# Patient Record
Sex: MALE | Race: WHITE | NOT HISPANIC OR LATINO | Employment: PART TIME | URBAN - METROPOLITAN AREA
[De-identification: names, ages, dates, MRNs, and addresses within clinical notes are randomized per-mention and may not be internally consistent; named-entity substitution may affect disease eponyms.]

---

## 2021-08-06 ENCOUNTER — HOSPITAL ENCOUNTER (OUTPATIENT)
Dept: MEDSURG UNIT | Facility: HOSPITAL | Age: 46
End: 2021-08-07
Attending: INTERNAL MEDICINE | Admitting: INTERNAL MEDICINE

## 2021-08-06 LAB
ABS NEUT (OLG): 17.76 X10(3)/MCL (ref 2.1–9.2)
ALBUMIN SERPL-MCNC: 2.5 GM/DL (ref 3.5–5)
ALBUMIN/GLOB SERPL: 0.5 RATIO (ref 1.1–2)
ALP SERPL-CCNC: 110 UNIT/L (ref 40–150)
ALT SERPL-CCNC: 26 UNIT/L (ref 0–55)
APPEARANCE, UA: CLEAR
AST SERPL-CCNC: 28 UNIT/L (ref 5–34)
BACTERIA #/AREA URNS AUTO: ABNORMAL /HPF
BASOPHILS # BLD AUTO: 0 X10(3)/MCL (ref 0–0.2)
BASOPHILS NFR BLD AUTO: 0 %
BILIRUB SERPL-MCNC: 0.2 MG/DL
BILIRUB UR QL STRIP: NEGATIVE
BILIRUBIN DIRECT+TOT PNL SERPL-MCNC: 0.1 MG/DL (ref 0–0.5)
BILIRUBIN DIRECT+TOT PNL SERPL-MCNC: 0.1 MG/DL (ref 0–0.8)
BUN SERPL-MCNC: 9.9 MG/DL (ref 8.9–20.6)
CALCIUM SERPL-MCNC: 10.3 MG/DL (ref 8.4–10.2)
CHLORIDE SERPL-SCNC: 97 MMOL/L (ref 98–107)
CO2 SERPL-SCNC: 26 MMOL/L (ref 22–29)
COLOR UR: NORMAL
CREAT SERPL-MCNC: 0.76 MG/DL (ref 0.73–1.18)
ERYTHROCYTE [DISTWIDTH] IN BLOOD BY AUTOMATED COUNT: 14.3 % (ref 11.5–14.5)
GLOBULIN SER-MCNC: 5.1 GM/DL (ref 2.4–3.5)
GLUCOSE (UA): NEGATIVE
GLUCOSE SERPL-MCNC: 167 MG/DL (ref 74–100)
HAV IGM SERPL QL IA: NONREACTIVE
HBV CORE IGM SERPL QL IA: NONREACTIVE
HBV SURFACE AG SERPL QL IA: NONREACTIVE
HCT VFR BLD AUTO: 28.5 % (ref 40–51)
HCV AB SERPL QL IA: NONREACTIVE
HGB BLD-MCNC: 9.3 GM/DL (ref 13.5–17.5)
HGB UR QL STRIP: NEGATIVE
HIV 1+2 AB+HIV1 P24 AG SERPL QL IA: NONREACTIVE
HYALINE CASTS #/AREA URNS LPF: ABNORMAL /LPF
IMM GRANULOCYTES # BLD AUTO: 0.08 10*3/UL
IMM GRANULOCYTES NFR BLD AUTO: 0 %
KETONES UR QL STRIP: NEGATIVE
LACTATE SERPL-SCNC: 1 MMOL/L (ref 0.5–2.2)
LEUKOCYTE ESTERASE UR QL STRIP: NEGATIVE
LYMPHOCYTES # BLD AUTO: 0.4 X10(3)/MCL (ref 0.6–4.6)
LYMPHOCYTES NFR BLD AUTO: 2 %
MAGNESIUM SERPL-MCNC: 2.1 MG/DL (ref 1.6–2.6)
MCH RBC QN AUTO: 25.8 PG (ref 26–34)
MCHC RBC AUTO-ENTMCNC: 32.6 GM/DL (ref 31–37)
MCV RBC AUTO: 78.9 FL (ref 80–100)
MONOCYTES # BLD AUTO: 0.1 X10(3)/MCL (ref 0.1–1.3)
MONOCYTES NFR BLD AUTO: 0 %
NEUTROPHILS # BLD AUTO: 17.76 X10(3)/MCL (ref 2.1–9.2)
NEUTROPHILS NFR BLD AUTO: 97 %
NITRITE UR QL STRIP: NEGATIVE
NRBC BLD AUTO-RTO: 0 % (ref 0–0.2)
PH UR STRIP: 7.5 [PH] (ref 4.5–8)
PHOSPHATE SERPL-MCNC: 3 MG/DL (ref 2.3–4.7)
PLATELET # BLD AUTO: 493 X10(3)/MCL (ref 130–400)
PMV BLD AUTO: 9 FL (ref 7.4–10.4)
POTASSIUM SERPL-SCNC: 4.5 MMOL/L (ref 3.5–5.1)
PROT SERPL-MCNC: 7.6 GM/DL (ref 6.4–8.3)
PROT UR QL STRIP: NEGATIVE
RBC # BLD AUTO: 3.61 X10(6)/MCL (ref 4.5–5.9)
RBC #/AREA URNS AUTO: ABNORMAL /HPF
SARS-COV-2 AG RESP QL IA.RAPID: NEGATIVE
SODIUM SERPL-SCNC: 130 MMOL/L (ref 136–145)
SP GR UR STRIP: NORMAL (ref 1–1.03)
SQUAMOUS #/AREA URNS LPF: ABNORMAL /LPF
UROBILINOGEN UR STRIP-ACNC: NORMAL
WBC # SPEC AUTO: 18.4 X10(3)/MCL (ref 4.5–11)
WBC #/AREA URNS AUTO: ABNORMAL /HPF

## 2021-08-07 LAB
ABS NEUT (OLG): 11.95 X10(3)/MCL (ref 2.1–9.2)
ALBUMIN SERPL-MCNC: 2.2 GM/DL (ref 3.5–5)
ALBUMIN/GLOB SERPL: 0.5 RATIO (ref 1.1–2)
ALP SERPL-CCNC: 95 UNIT/L (ref 40–150)
ALT SERPL-CCNC: 25 UNIT/L (ref 0–55)
AST SERPL-CCNC: 22 UNIT/L (ref 5–34)
BASOPHILS # BLD AUTO: 0 X10(3)/MCL (ref 0–0.2)
BASOPHILS NFR BLD AUTO: 0 %
BILIRUB SERPL-MCNC: 0.1 MG/DL
BILIRUBIN DIRECT+TOT PNL SERPL-MCNC: 0 MG/DL (ref 0–0.8)
BILIRUBIN DIRECT+TOT PNL SERPL-MCNC: 0.1 MG/DL (ref 0–0.5)
BUN SERPL-MCNC: 10.3 MG/DL (ref 8.9–20.6)
CALCIUM SERPL-MCNC: 9.8 MG/DL (ref 8.4–10.2)
CHLORIDE SERPL-SCNC: 101 MMOL/L (ref 98–107)
CO2 SERPL-SCNC: 25 MMOL/L (ref 22–29)
CREAT SERPL-MCNC: 0.67 MG/DL (ref 0.73–1.18)
ERYTHROCYTE [DISTWIDTH] IN BLOOD BY AUTOMATED COUNT: 14.2 % (ref 11.5–14.5)
GLOBULIN SER-MCNC: 4.4 GM/DL (ref 2.4–3.5)
GLUCOSE SERPL-MCNC: 131 MG/DL (ref 74–100)
HCT VFR BLD AUTO: 25.6 % (ref 40–51)
HGB BLD-MCNC: 8.2 GM/DL (ref 13.5–17.5)
IMM GRANULOCYTES # BLD AUTO: 0.07 10*3/UL
IMM GRANULOCYTES NFR BLD AUTO: 0 %
LYMPHOCYTES # BLD AUTO: 1.1 X10(3)/MCL (ref 0.6–4.6)
LYMPHOCYTES NFR BLD AUTO: 8 %
MCH RBC QN AUTO: 25.4 PG (ref 26–34)
MCHC RBC AUTO-ENTMCNC: 32 GM/DL (ref 31–37)
MCV RBC AUTO: 79.3 FL (ref 80–100)
MONOCYTES # BLD AUTO: 0.6 X10(3)/MCL (ref 0.1–1.3)
MONOCYTES NFR BLD AUTO: 4 %
NEUTROPHILS # BLD AUTO: 11.95 X10(3)/MCL (ref 2.1–9.2)
NEUTROPHILS NFR BLD AUTO: 87 %
NRBC BLD AUTO-RTO: 0 % (ref 0–0.2)
OSMOLALITY SERPL: 276 MOSM/KG (ref 280–300)
OSMOLALITY UR: 403 MOSM/KG (ref 300–1300)
PLATELET # BLD AUTO: 482 X10(3)/MCL (ref 130–400)
PMV BLD AUTO: 9.3 FL (ref 7.4–10.4)
POTASSIUM SERPL-SCNC: 4.1 MMOL/L (ref 3.5–5.1)
PROT SERPL-MCNC: 6.6 GM/DL (ref 6.4–8.3)
PTH-INTACT SERPL-MCNC: 12.7 PG/ML (ref 8.7–77)
RBC # BLD AUTO: 3.23 X10(6)/MCL (ref 4.5–5.9)
SODIUM SERPL-SCNC: 130 MMOL/L (ref 136–145)
SODIUM UR-SCNC: 118 MMOL/L
WBC # SPEC AUTO: 13.7 X10(3)/MCL (ref 4.5–11)

## 2021-08-16 ENCOUNTER — HISTORICAL (OUTPATIENT)
Dept: ADMINISTRATIVE | Facility: HOSPITAL | Age: 46
End: 2021-08-16

## 2021-08-16 LAB — SARS-COV-2 AG RESP QL IA.RAPID: NEGATIVE

## 2021-08-18 ENCOUNTER — HISTORICAL (OUTPATIENT)
Dept: SURGERY | Facility: HOSPITAL | Age: 46
End: 2021-08-18

## 2021-09-15 ENCOUNTER — HISTORICAL (OUTPATIENT)
Dept: RADIATION THERAPY | Facility: HOSPITAL | Age: 46
End: 2021-09-15

## 2021-09-16 ENCOUNTER — HISTORICAL (OUTPATIENT)
Dept: RADIATION THERAPY | Facility: HOSPITAL | Age: 46
End: 2021-09-16

## 2021-09-17 ENCOUNTER — HISTORICAL (OUTPATIENT)
Dept: RADIATION THERAPY | Facility: HOSPITAL | Age: 46
End: 2021-09-17

## 2021-09-20 ENCOUNTER — HISTORICAL (OUTPATIENT)
Dept: RADIATION THERAPY | Facility: HOSPITAL | Age: 46
End: 2021-09-20

## 2021-09-21 ENCOUNTER — HISTORICAL (OUTPATIENT)
Dept: RADIATION THERAPY | Facility: HOSPITAL | Age: 46
End: 2021-09-21

## 2021-09-21 ENCOUNTER — HISTORICAL (OUTPATIENT)
Dept: ADMINISTRATIVE | Facility: HOSPITAL | Age: 46
End: 2021-09-21

## 2021-09-21 LAB
ABS NEUT (OLG): 13.59 X10(3)/MCL (ref 2.1–9.2)
ALBUMIN SERPL-MCNC: 2.1 GM/DL (ref 3.5–5)
ALBUMIN/GLOB SERPL: 0.5 RATIO (ref 1.1–2)
ALP SERPL-CCNC: 87 UNIT/L (ref 40–150)
ALT SERPL-CCNC: 18 UNIT/L (ref 0–55)
APPEARANCE, UA: CLEAR
AST SERPL-CCNC: 20 UNIT/L (ref 5–34)
BACTERIA SPEC CULT: ABNORMAL /HPF
BASOPHILS # BLD AUTO: 0.1 X10(3)/MCL (ref 0–0.2)
BASOPHILS NFR BLD AUTO: 0 %
BILIRUB SERPL-MCNC: 0.3 MG/DL
BILIRUB UR QL STRIP: NEGATIVE
BILIRUBIN DIRECT+TOT PNL SERPL-MCNC: 0.1 MG/DL (ref 0–0.8)
BILIRUBIN DIRECT+TOT PNL SERPL-MCNC: 0.2 MG/DL (ref 0–0.5)
BUN SERPL-MCNC: 7.4 MG/DL (ref 8.9–20.6)
CALCIUM SERPL-MCNC: 8.2 MG/DL (ref 8.4–10.2)
CHLORIDE SERPL-SCNC: 97 MMOL/L (ref 98–107)
CO2 SERPL-SCNC: 20 MMOL/L (ref 22–29)
COLOR UR: YELLOW
CREAT SERPL-MCNC: 0.55 MG/DL (ref 0.73–1.18)
EOSINOPHIL # BLD AUTO: 0.1 X10(3)/MCL (ref 0–0.9)
EOSINOPHIL NFR BLD AUTO: 0 %
ERYTHROCYTE [DISTWIDTH] IN BLOOD BY AUTOMATED COUNT: 17.2 % (ref 11.5–17)
GLOBULIN SER-MCNC: 4.4 GM/DL (ref 2.4–3.5)
GLUCOSE (UA): NEGATIVE
GLUCOSE SERPL-MCNC: 137 MG/DL (ref 74–100)
HCT VFR BLD AUTO: 31.8 % (ref 42–52)
HGB BLD-MCNC: 9.8 GM/DL (ref 14–18)
HGB UR QL STRIP: NEGATIVE
KETONES UR QL STRIP: NEGATIVE
LEUKOCYTE ESTERASE UR QL STRIP: NEGATIVE
LYMPHOCYTES # BLD AUTO: 0.3 X10(3)/MCL (ref 0.6–4.6)
LYMPHOCYTES NFR BLD AUTO: 2 %
MCH RBC QN AUTO: 24.6 PG (ref 27–31)
MCHC RBC AUTO-ENTMCNC: 30.8 GM/DL (ref 33–36)
MCV RBC AUTO: 79.7 FL (ref 80–94)
MONOCYTES # BLD AUTO: 0.9 X10(3)/MCL (ref 0.1–1.3)
MONOCYTES NFR BLD AUTO: 6 %
NEUTROPHILS # BLD AUTO: 13.59 X10(3)/MCL (ref 2.1–9.2)
NEUTROPHILS NFR BLD AUTO: 90 %
NITRITE UR QL STRIP: NEGATIVE
PH UR STRIP: 7.5 [PH] (ref 5–9)
PLATELET # BLD AUTO: 588 X10(3)/MCL (ref 130–400)
PMV BLD AUTO: 9.1 FL (ref 9.4–12.4)
POTASSIUM SERPL-SCNC: 4.6 MMOL/L (ref 3.5–5.1)
PROT SERPL-MCNC: 6.5 GM/DL (ref 6.4–8.3)
PROT UR QL STRIP: ABNORMAL
RBC # BLD AUTO: 3.99 X10(6)/MCL (ref 4.7–6.1)
RBC #/AREA URNS HPF: ABNORMAL /[HPF]
SODIUM SERPL-SCNC: 128 MMOL/L (ref 136–145)
SP GR UR STRIP: 1.02 (ref 1–1.03)
SQUAMOUS EPITHELIAL, UA: ABNORMAL /HPF (ref 0–4)
UROBILINOGEN UR STRIP-ACNC: 1
WBC # SPEC AUTO: 15.1 X10(3)/MCL (ref 4.5–11.5)
WBC #/AREA URNS HPF: ABNORMAL /HPF

## 2021-09-22 ENCOUNTER — HISTORICAL (OUTPATIENT)
Dept: RADIATION THERAPY | Facility: HOSPITAL | Age: 46
End: 2021-09-22

## 2021-09-23 ENCOUNTER — HISTORICAL (OUTPATIENT)
Dept: RADIATION THERAPY | Facility: HOSPITAL | Age: 46
End: 2021-09-23

## 2021-09-24 ENCOUNTER — HISTORICAL (OUTPATIENT)
Dept: RADIATION THERAPY | Facility: HOSPITAL | Age: 46
End: 2021-09-24

## 2021-09-27 ENCOUNTER — HISTORICAL (OUTPATIENT)
Dept: RADIATION THERAPY | Facility: HOSPITAL | Age: 46
End: 2021-09-27

## 2021-09-28 ENCOUNTER — HISTORICAL (OUTPATIENT)
Dept: RADIATION THERAPY | Facility: HOSPITAL | Age: 46
End: 2021-09-28

## 2021-09-29 ENCOUNTER — HISTORICAL (OUTPATIENT)
Dept: RADIATION THERAPY | Facility: HOSPITAL | Age: 46
End: 2021-09-29

## 2021-09-30 ENCOUNTER — HISTORICAL (OUTPATIENT)
Dept: ADMINISTRATIVE | Facility: HOSPITAL | Age: 46
End: 2021-09-30

## 2021-09-30 ENCOUNTER — HISTORICAL (OUTPATIENT)
Dept: RADIOLOGY | Facility: HOSPITAL | Age: 46
End: 2021-09-30

## 2021-09-30 ENCOUNTER — HISTORICAL (OUTPATIENT)
Dept: RADIATION THERAPY | Facility: HOSPITAL | Age: 46
End: 2021-09-30

## 2021-09-30 LAB
ABS NEUT (OLG): 6.46 X10(3)/MCL (ref 2.1–9.2)
ALBUMIN SERPL-MCNC: 2.6 GM/DL (ref 3.5–5)
ALBUMIN/GLOB SERPL: 0.5 RATIO (ref 1.1–2)
ALP SERPL-CCNC: 79 UNIT/L (ref 40–150)
ALT SERPL-CCNC: 27 UNIT/L (ref 0–55)
AST SERPL-CCNC: 23 UNIT/L (ref 5–34)
BASOPHILS # BLD AUTO: 0 X10(3)/MCL (ref 0–0.2)
BASOPHILS NFR BLD AUTO: 1 %
BILIRUB SERPL-MCNC: 0.2 MG/DL
BILIRUBIN DIRECT+TOT PNL SERPL-MCNC: 0.1 MG/DL (ref 0–0.5)
BILIRUBIN DIRECT+TOT PNL SERPL-MCNC: 0.1 MG/DL (ref 0–0.8)
BUN SERPL-MCNC: 12.8 MG/DL (ref 8.9–20.6)
CALCIUM SERPL-MCNC: 9.3 MG/DL (ref 8.4–10.2)
CHLORIDE SERPL-SCNC: 99 MMOL/L (ref 98–107)
CO2 SERPL-SCNC: 26 MMOL/L (ref 22–29)
CREAT SERPL-MCNC: 0.56 MG/DL (ref 0.73–1.18)
EOSINOPHIL # BLD AUTO: 0.1 X10(3)/MCL (ref 0–0.9)
EOSINOPHIL NFR BLD AUTO: 2 %
ERYTHROCYTE [DISTWIDTH] IN BLOOD BY AUTOMATED COUNT: 17.4 % (ref 11.5–14.5)
FERRITIN SERPL-MCNC: 714.65 NG/ML (ref 21.81–274.66)
GLOBULIN SER-MCNC: 5.4 GM/DL (ref 2.4–3.5)
GLUCOSE SERPL-MCNC: 73 MG/DL (ref 74–100)
HCT VFR BLD AUTO: 34.7 % (ref 40–51)
HGB BLD-MCNC: 10.5 GM/DL (ref 13.5–17.5)
IMM GRANULOCYTES # BLD AUTO: 0.06 10*3/UL
IMM GRANULOCYTES NFR BLD AUTO: 1 %
IRON SATN MFR SERPL: 11 % (ref 20–50)
IRON SERPL-MCNC: 24 UG/DL (ref 65–175)
LYMPHOCYTES # BLD AUTO: 0.5 X10(3)/MCL (ref 0.6–4.6)
LYMPHOCYTES NFR BLD AUTO: 6 %
MCH RBC QN AUTO: 24.2 PG (ref 26–34)
MCHC RBC AUTO-ENTMCNC: 30.3 GM/DL (ref 31–37)
MCV RBC AUTO: 80.1 FL (ref 80–100)
MONOCYTES # BLD AUTO: 0.9 X10(3)/MCL (ref 0.1–1.3)
MONOCYTES NFR BLD AUTO: 11 %
NEUTROPHILS # BLD AUTO: 6.46 X10(3)/MCL (ref 2.1–9.2)
NEUTROPHILS NFR BLD AUTO: 80 %
NRBC BLD AUTO-RTO: 0 % (ref 0–0.2)
PLATELET # BLD AUTO: 431 X10(3)/MCL (ref 130–400)
PMV BLD AUTO: 8.7 FL (ref 7.4–10.4)
POTASSIUM SERPL-SCNC: 4.8 MMOL/L (ref 3.5–5.1)
PROT SERPL-MCNC: 8 GM/DL (ref 6.4–8.3)
RBC # BLD AUTO: 4.33 X10(6)/MCL (ref 4.5–5.9)
SODIUM SERPL-SCNC: 132 MMOL/L (ref 136–145)
T4 FREE SERPL-MCNC: 0.97 NG/DL (ref 0.7–1.48)
TIBC SERPL-MCNC: 196 UG/DL (ref 69–240)
TIBC SERPL-MCNC: 220 UG/DL (ref 250–450)
TRANSFERRIN SERPL-MCNC: 190 MG/DL (ref 174–364)
VIT B12 SERPL-MCNC: 1046 PG/ML (ref 213–816)
WBC # SPEC AUTO: 8.1 X10(3)/MCL (ref 4.5–11)

## 2021-10-04 ENCOUNTER — HISTORICAL (OUTPATIENT)
Dept: RADIATION THERAPY | Facility: HOSPITAL | Age: 46
End: 2021-10-04

## 2021-10-05 ENCOUNTER — HISTORICAL (OUTPATIENT)
Dept: ADMINISTRATIVE | Facility: HOSPITAL | Age: 46
End: 2021-10-05

## 2021-10-05 LAB
ALBUMIN SERPL-MCNC: 3.6 G/DL (ref 4–5)
ALBUMIN/GLOB SERPL: 0.9 {RATIO} (ref 1.2–2.2)
ALP SERPL-CCNC: 95 IU/L (ref 44–121)
ALT SERPL-CCNC: 14 IU/L (ref 0–44)
AST SERPL-CCNC: 14 IU/L (ref 0–40)
BASOPHILS # BLD AUTO: 0.1 X10E3/UL (ref 0–0.2)
BASOPHILS NFR BLD AUTO: 1 %
BILIRUB SERPL-MCNC: <0.2 MG/DL (ref 0–1.2)
BUN SERPL-MCNC: 23 MG/DL (ref 6–24)
CALCIUM SERPL-MCNC: 8.9 MG/DL (ref 8.7–10.2)
CHLORIDE SERPL-SCNC: 99 MMOL/L (ref 96–106)
CO2 SERPL-SCNC: 21 MMOL/L (ref 20–29)
CREAT SERPL-MCNC: 0.49 MG/DL (ref 0.76–1.27)
CREAT/UREA NIT SERPL: 47 (ref 9–20)
EOSINOPHIL # BLD AUTO: 0.2 X10E3/UL (ref 0–0.4)
EOSINOPHIL NFR BLD AUTO: 2 %
ERYTHROCYTE [DISTWIDTH] IN BLOOD BY AUTOMATED COUNT: 19.1 % (ref 11.6–15.4)
GLOBULIN SER-MCNC: 4 G/DL (ref 1.5–4.5)
GLUCOSE SERPL-MCNC: 112 MG/DL (ref 65–99)
HCT VFR BLD AUTO: 36.8 % (ref 37.5–51)
HGB BLD-MCNC: 11.2 G/DL (ref 13–17.7)
LYMPHOCYTES # BLD AUTO: 0.3 X10E3/UL (ref 0.7–3.1)
LYMPHOCYTES NFR BLD AUTO: 4 %
MCH RBC QN AUTO: 24.7 PG (ref 26.6–33)
MCHC RBC AUTO-ENTMCNC: 30.4 G/DL (ref 31.5–35.7)
MCV RBC AUTO: 81 FL (ref 79–97)
MONOCYTES # BLD AUTO: 0.6 X10E3/UL (ref 0.1–0.9)
MONOCYTES NFR BLD AUTO: 7 %
NEUTROPHILS # BLD AUTO: 7 X10E3/UL (ref 1.4–7)
NEUTROPHILS NFR BLD AUTO: 86 %
PLATELET # BLD AUTO: 353 X10E3/UL (ref 150–450)
POTASSIUM SERPL-SCNC: 5 MMOL/L (ref 3.5–5.2)
PROT SERPL-MCNC: 7.6 G/DL (ref 6–8.5)
RBC # BLD AUTO: 4.54 X10(6)/MCL (ref 4.14–5.8)
SODIUM SERPL-SCNC: 133 MMOL/L (ref 134–144)
WBC # SPEC AUTO: 8.2 X10E3/UL (ref 3.4–10.8)

## 2021-10-08 ENCOUNTER — HISTORICAL (OUTPATIENT)
Dept: ADMINISTRATIVE | Facility: HOSPITAL | Age: 46
End: 2021-10-08

## 2021-10-08 LAB
ABS NEUT (OLG): 8.42 X10(3)/MCL (ref 2.1–9.2)
ALBUMIN SERPL-MCNC: 3.3 GM/DL (ref 3.5–5)
ALBUMIN/GLOB SERPL: 0.6 RATIO (ref 1.1–2)
ALP SERPL-CCNC: 93 UNIT/L (ref 40–150)
ALT SERPL-CCNC: 11 UNIT/L (ref 0–55)
AST SERPL-CCNC: 15 UNIT/L (ref 5–34)
BASOPHILS # BLD AUTO: 0.1 X10(3)/MCL (ref 0–0.2)
BASOPHILS NFR BLD AUTO: 1 %
BILIRUB SERPL-MCNC: 0.1 MG/DL
BILIRUBIN DIRECT+TOT PNL SERPL-MCNC: 0 MG/DL (ref 0–0.8)
BILIRUBIN DIRECT+TOT PNL SERPL-MCNC: 0.1 MG/DL (ref 0–0.5)
BUN SERPL-MCNC: 18.4 MG/DL (ref 8.9–20.6)
CALCIUM SERPL-MCNC: 10 MG/DL (ref 8.4–10.2)
CHLORIDE SERPL-SCNC: 105 MMOL/L (ref 98–107)
CO2 SERPL-SCNC: 25 MMOL/L (ref 22–29)
CREAT SERPL-MCNC: 0.63 MG/DL (ref 0.73–1.18)
EOSINOPHIL # BLD AUTO: 0.2 X10(3)/MCL (ref 0–0.9)
EOSINOPHIL NFR BLD AUTO: 2 %
ERYTHROCYTE [DISTWIDTH] IN BLOOD BY AUTOMATED COUNT: 19.9 % (ref 11.5–14.5)
GLOBULIN SER-MCNC: 5.3 GM/DL (ref 2.4–3.5)
GLUCOSE SERPL-MCNC: 97 MG/DL (ref 74–100)
HCT VFR BLD AUTO: 38.5 % (ref 40–51)
HGB BLD-MCNC: 11.6 GM/DL (ref 13.5–17.5)
IMM GRANULOCYTES # BLD AUTO: 0.05 10*3/UL
IMM GRANULOCYTES NFR BLD AUTO: 0 %
LYMPHOCYTES # BLD AUTO: 0.6 X10(3)/MCL (ref 0.6–4.6)
LYMPHOCYTES NFR BLD AUTO: 6 %
MCH RBC QN AUTO: 24.9 PG (ref 26–34)
MCHC RBC AUTO-ENTMCNC: 30.1 GM/DL (ref 31–37)
MCV RBC AUTO: 82.6 FL (ref 80–100)
MONOCYTES # BLD AUTO: 1 X10(3)/MCL (ref 0.1–1.3)
MONOCYTES NFR BLD AUTO: 10 %
NEUTROPHILS # BLD AUTO: 8.42 X10(3)/MCL (ref 2.1–9.2)
NEUTROPHILS NFR BLD AUTO: 81 %
NRBC BLD AUTO-RTO: 0 % (ref 0–0.2)
PLATELET # BLD AUTO: 331 X10(3)/MCL (ref 130–400)
PMV BLD AUTO: 8.9 FL (ref 7.4–10.4)
POTASSIUM SERPL-SCNC: 4 MMOL/L (ref 3.5–5.1)
PROT SERPL-MCNC: 8.6 GM/DL (ref 6.4–8.3)
RBC # BLD AUTO: 4.66 X10(6)/MCL (ref 4.5–5.9)
SARS-COV-2 RNA RESP QL NAA+PROBE: NOT DETECTED
SODIUM SERPL-SCNC: 136 MMOL/L (ref 136–145)
WBC # SPEC AUTO: 10.4 X10(3)/MCL (ref 4.5–11)

## 2021-10-11 ENCOUNTER — HISTORICAL (OUTPATIENT)
Dept: SURGERY | Facility: HOSPITAL | Age: 46
End: 2021-10-11

## 2021-10-15 ENCOUNTER — HISTORICAL (OUTPATIENT)
Dept: ADMINISTRATIVE | Facility: HOSPITAL | Age: 46
End: 2021-10-15

## 2021-10-15 LAB
ABS NEUT (OLG): 5.36 X10(3)/MCL (ref 2.1–9.2)
ALBUMIN SERPL-MCNC: 3.5 GM/DL (ref 3.5–5)
ALBUMIN/GLOB SERPL: 0.8 RATIO (ref 1.1–2)
ALP SERPL-CCNC: 71 UNIT/L (ref 40–150)
ALT SERPL-CCNC: 12 UNIT/L (ref 0–55)
AST SERPL-CCNC: 11 UNIT/L (ref 5–34)
BASOPHILS # BLD AUTO: 0.1 X10(3)/MCL (ref 0–0.2)
BASOPHILS NFR BLD AUTO: 1 %
BILIRUB SERPL-MCNC: 0.1 MG/DL
BILIRUBIN DIRECT+TOT PNL SERPL-MCNC: 0 MG/DL (ref 0–0.8)
BILIRUBIN DIRECT+TOT PNL SERPL-MCNC: 0.1 MG/DL (ref 0–0.5)
BUN SERPL-MCNC: 22.1 MG/DL (ref 8.9–20.6)
CALCIUM SERPL-MCNC: 9.9 MG/DL (ref 8.4–10.2)
CHLORIDE SERPL-SCNC: 105 MMOL/L (ref 98–107)
CO2 SERPL-SCNC: 24 MMOL/L (ref 22–29)
CREAT SERPL-MCNC: 0.67 MG/DL (ref 0.73–1.18)
EOSINOPHIL # BLD AUTO: 0.3 X10(3)/MCL (ref 0–0.9)
EOSINOPHIL NFR BLD AUTO: 4 %
ERYTHROCYTE [DISTWIDTH] IN BLOOD BY AUTOMATED COUNT: 23 % (ref 11.5–14.5)
FOLATE SERPL-MCNC: 14.4 NG/ML (ref 7–31.4)
GLOBULIN SER-MCNC: 4.6 GM/DL (ref 2.4–3.5)
GLUCOSE SERPL-MCNC: 82 MG/DL (ref 74–100)
HCT VFR BLD AUTO: 37.6 % (ref 40–51)
HGB BLD-MCNC: 11.8 GM/DL (ref 13.5–17.5)
IMM GRANULOCYTES # BLD AUTO: 0.11 10*3/UL
IMM GRANULOCYTES NFR BLD AUTO: 2 %
IRON SATN MFR SERPL: 20 % (ref 20–50)
IRON SERPL-MCNC: 64 UG/DL (ref 65–175)
LYMPHOCYTES # BLD AUTO: 0.8 X10(3)/MCL (ref 0.6–4.6)
LYMPHOCYTES NFR BLD AUTO: 10 %
MAGNESIUM SERPL-MCNC: 2.2 MG/DL (ref 1.6–2.6)
MCH RBC QN AUTO: 26.2 PG (ref 26–34)
MCHC RBC AUTO-ENTMCNC: 31.4 GM/DL (ref 31–37)
MCV RBC AUTO: 83.4 FL (ref 80–100)
MONOCYTES # BLD AUTO: 1 X10(3)/MCL (ref 0.1–1.3)
MONOCYTES NFR BLD AUTO: 13 %
NEUTROPHILS # BLD AUTO: 5.36 X10(3)/MCL (ref 2.1–9.2)
NEUTROPHILS NFR BLD AUTO: 71 %
NRBC BLD AUTO-RTO: 0 % (ref 0–0.2)
PLATELET # BLD AUTO: 252 X10(3)/MCL (ref 130–400)
PMV BLD AUTO: 8.7 FL (ref 7.4–10.4)
POTASSIUM SERPL-SCNC: 4.7 MMOL/L (ref 3.5–5.1)
PROT SERPL-MCNC: 8.1 GM/DL (ref 6.4–8.3)
RBC # BLD AUTO: 4.51 X10(6)/MCL (ref 4.5–5.9)
SODIUM SERPL-SCNC: 136 MMOL/L (ref 136–145)
TIBC SERPL-MCNC: 250 UG/DL (ref 69–240)
TIBC SERPL-MCNC: 314 UG/DL (ref 250–450)
TRANSFERRIN SERPL-MCNC: 283 MG/DL (ref 174–364)
VIT B12 SERPL-MCNC: 862 PG/ML (ref 213–816)
WBC # SPEC AUTO: 7.6 X10(3)/MCL (ref 4.5–11)

## 2021-10-18 ENCOUNTER — HISTORICAL (OUTPATIENT)
Dept: INFUSION THERAPY | Facility: HOSPITAL | Age: 46
End: 2021-10-18

## 2021-10-25 ENCOUNTER — HISTORICAL (OUTPATIENT)
Dept: INFUSION THERAPY | Facility: HOSPITAL | Age: 46
End: 2021-10-25

## 2021-10-25 LAB
ABS NEUT (OLG): 0.19 X10(3)/MCL (ref 2.1–9.2)
ALBUMIN SERPL-MCNC: 3.3 GM/DL (ref 3.5–5)
ALBUMIN/GLOB SERPL: 0.8 RATIO (ref 1.1–2)
ALP SERPL-CCNC: 45 UNIT/L (ref 40–150)
ALT SERPL-CCNC: 13 UNIT/L (ref 0–55)
ANISOCYTOSIS BLD QL SMEAR: ABNORMAL
AST SERPL-CCNC: 14 UNIT/L (ref 5–34)
BASOPHILS NFR BLD MANUAL: 0 %
BILIRUB SERPL-MCNC: 0.3 MG/DL
BILIRUBIN DIRECT+TOT PNL SERPL-MCNC: 0.1 MG/DL (ref 0–0.8)
BILIRUBIN DIRECT+TOT PNL SERPL-MCNC: 0.2 MG/DL (ref 0–0.5)
BUN SERPL-MCNC: 17.8 MG/DL (ref 8.9–20.6)
CALCIUM SERPL-MCNC: 9.7 MG/DL (ref 8.7–10.5)
CHLORIDE SERPL-SCNC: 103 MMOL/L (ref 98–107)
CO2 SERPL-SCNC: 21 MMOL/L (ref 22–29)
CREAT SERPL-MCNC: 0.67 MG/DL (ref 0.73–1.18)
EOSINOPHIL NFR BLD MANUAL: 0 %
ERYTHROCYTE [DISTWIDTH] IN BLOOD BY AUTOMATED COUNT: 23.2 % (ref 11.5–14.5)
GLOBULIN SER-MCNC: 4 GM/DL (ref 2.4–3.5)
GLUCOSE SERPL-MCNC: 121 MG/DL (ref 74–100)
GRANULOCYTES NFR BLD MANUAL: 44 % (ref 43–75)
HCT VFR BLD AUTO: 37.3 % (ref 40–51)
HGB BLD-MCNC: 12.2 GM/DL (ref 13.5–17.5)
LYMPHOCYTES NFR BLD MANUAL: 40 % (ref 20.5–51.1)
MAGNESIUM SERPL-MCNC: 1.8 MG/DL (ref 1.6–2.6)
MCH RBC QN AUTO: 26.9 PG (ref 26–34)
MCHC RBC AUTO-ENTMCNC: 32.7 GM/DL (ref 31–37)
MCV RBC AUTO: 82.2 FL (ref 80–100)
MONOCYTES NFR BLD MANUAL: 16 % (ref 2–9)
PHOSPHATE SERPL-MCNC: 3.5 MG/DL (ref 2.3–4.7)
PLATELET # BLD AUTO: 158 X10(3)/MCL (ref 130–400)
PLATELET # BLD EST: ADEQUATE 10*3/UL
PMV BLD AUTO: 8.9 FL (ref 7.4–10.4)
POTASSIUM SERPL-SCNC: 3.9 MMOL/L (ref 3.5–5.1)
PROT SERPL-MCNC: 7.3 GM/DL (ref 6.4–8.3)
RBC # BLD AUTO: 4.54 X10(6)/MCL (ref 4.5–5.9)
SODIUM SERPL-SCNC: 132 MMOL/L (ref 136–145)
WBC # SPEC AUTO: 0.7 X10(3)/MCL (ref 4.5–11)

## 2021-10-26 ENCOUNTER — HISTORICAL (OUTPATIENT)
Dept: INFUSION THERAPY | Facility: HOSPITAL | Age: 46
End: 2021-10-26

## 2021-10-27 ENCOUNTER — HISTORICAL (OUTPATIENT)
Dept: INFUSION THERAPY | Facility: HOSPITAL | Age: 46
End: 2021-10-27

## 2021-10-28 ENCOUNTER — HISTORICAL (OUTPATIENT)
Dept: INFUSION THERAPY | Facility: HOSPITAL | Age: 46
End: 2021-10-28

## 2021-10-29 ENCOUNTER — HISTORICAL (OUTPATIENT)
Dept: INFUSION THERAPY | Facility: HOSPITAL | Age: 46
End: 2021-10-29

## 2021-10-29 ENCOUNTER — HISTORICAL (OUTPATIENT)
Dept: RADIOLOGY | Facility: HOSPITAL | Age: 46
End: 2021-10-29

## 2021-11-10 ENCOUNTER — HISTORICAL (OUTPATIENT)
Dept: INFUSION THERAPY | Facility: HOSPITAL | Age: 46
End: 2021-11-10

## 2021-11-10 LAB
ABS NEUT (OLG): 5.35 X10(3)/MCL (ref 2.1–9.2)
ALBUMIN SERPL-MCNC: 3.3 GM/DL (ref 3.5–5)
ALBUMIN/GLOB SERPL: 0.8 RATIO (ref 1.1–2)
ALP SERPL-CCNC: 56 UNIT/L (ref 40–150)
ALT SERPL-CCNC: 8 UNIT/L (ref 0–55)
AST SERPL-CCNC: 10 UNIT/L (ref 5–34)
BASOPHILS # BLD AUTO: 0.1 X10(3)/MCL (ref 0–0.2)
BASOPHILS NFR BLD AUTO: 1 %
BILIRUB SERPL-MCNC: 0.4 MG/DL
BILIRUBIN DIRECT+TOT PNL SERPL-MCNC: 0.2 MG/DL (ref 0–0.5)
BILIRUBIN DIRECT+TOT PNL SERPL-MCNC: 0.2 MG/DL (ref 0–0.8)
BUN SERPL-MCNC: 9.2 MG/DL (ref 8.9–20.6)
CALCIUM SERPL-MCNC: 10 MG/DL (ref 8.7–10.5)
CHLORIDE SERPL-SCNC: 106 MMOL/L (ref 98–107)
CO2 SERPL-SCNC: 27 MMOL/L (ref 22–29)
CREAT SERPL-MCNC: 0.64 MG/DL (ref 0.73–1.18)
EOSINOPHIL # BLD AUTO: 0 X10(3)/MCL (ref 0–0.9)
EOSINOPHIL NFR BLD AUTO: 1 %
ERYTHROCYTE [DISTWIDTH] IN BLOOD BY AUTOMATED COUNT: 22.4 % (ref 11.5–14.5)
GLOBULIN SER-MCNC: 4.2 GM/DL (ref 2.4–3.5)
GLUCOSE SERPL-MCNC: 107 MG/DL (ref 74–100)
HCT VFR BLD AUTO: 38.9 % (ref 40–51)
HGB BLD-MCNC: 12.5 GM/DL (ref 13.5–17.5)
IMM GRANULOCYTES # BLD AUTO: 0.05 10*3/UL
IMM GRANULOCYTES NFR BLD AUTO: 1 %
LYMPHOCYTES # BLD AUTO: 0.8 X10(3)/MCL (ref 0.6–4.6)
LYMPHOCYTES NFR BLD AUTO: 11 %
MAGNESIUM SERPL-MCNC: 2 MG/DL (ref 1.6–2.6)
MCH RBC QN AUTO: 27.3 PG (ref 26–34)
MCHC RBC AUTO-ENTMCNC: 32.1 GM/DL (ref 31–37)
MCV RBC AUTO: 84.9 FL (ref 80–100)
MONOCYTES # BLD AUTO: 0.7 X10(3)/MCL (ref 0.1–1.3)
MONOCYTES NFR BLD AUTO: 10 %
NEUTROPHILS # BLD AUTO: 5.35 X10(3)/MCL (ref 2.1–9.2)
NEUTROPHILS NFR BLD AUTO: 77 %
NRBC BLD AUTO-RTO: 0 % (ref 0–0.2)
PHOSPHATE SERPL-MCNC: 3.1 MG/DL (ref 2.3–4.7)
PLATELET # BLD AUTO: 288 X10(3)/MCL (ref 130–400)
PMV BLD AUTO: 8.5 FL (ref 7.4–10.4)
POTASSIUM SERPL-SCNC: 4.2 MMOL/L (ref 3.5–5.1)
PROT SERPL-MCNC: 7.5 GM/DL (ref 6.4–8.3)
RBC # BLD AUTO: 4.58 X10(6)/MCL (ref 4.5–5.9)
SODIUM SERPL-SCNC: 139 MMOL/L (ref 136–145)
WBC # SPEC AUTO: 6.9 X10(3)/MCL (ref 4.5–11)

## 2021-11-17 ENCOUNTER — HISTORICAL (OUTPATIENT)
Dept: RADIOLOGY | Facility: HOSPITAL | Age: 46
End: 2021-11-17

## 2021-11-18 ENCOUNTER — HISTORICAL (OUTPATIENT)
Dept: RADIATION THERAPY | Facility: HOSPITAL | Age: 46
End: 2021-11-18

## 2021-12-01 ENCOUNTER — HISTORICAL (OUTPATIENT)
Dept: INFUSION THERAPY | Facility: HOSPITAL | Age: 46
End: 2021-12-01

## 2021-12-01 LAB
ABS NEUT (OLG): 7.37 X10(3)/MCL (ref 2.1–9.2)
ALBUMIN SERPL-MCNC: 3.7 GM/DL (ref 3.5–5)
ALBUMIN/GLOB SERPL: 1 RATIO (ref 1.1–2)
ALP SERPL-CCNC: 55 UNIT/L (ref 40–150)
ALT SERPL-CCNC: 17 UNIT/L (ref 0–55)
AST SERPL-CCNC: 14 UNIT/L (ref 5–34)
BASOPHILS # BLD AUTO: 0 X10(3)/MCL (ref 0–0.2)
BASOPHILS NFR BLD AUTO: 0 %
BILIRUB SERPL-MCNC: 0.2 MG/DL
BILIRUBIN DIRECT+TOT PNL SERPL-MCNC: 0.1 MG/DL (ref 0–0.5)
BILIRUBIN DIRECT+TOT PNL SERPL-MCNC: 0.1 MG/DL (ref 0–0.8)
BUN SERPL-MCNC: 11.2 MG/DL (ref 8.9–20.6)
CALCIUM SERPL-MCNC: 9.6 MG/DL (ref 8.7–10.5)
CHLORIDE SERPL-SCNC: 103 MMOL/L (ref 98–107)
CO2 SERPL-SCNC: 24 MMOL/L (ref 22–29)
CREAT SERPL-MCNC: 0.78 MG/DL (ref 0.73–1.18)
ERYTHROCYTE [DISTWIDTH] IN BLOOD BY AUTOMATED COUNT: 22.5 % (ref 11.5–14.5)
GLOBULIN SER-MCNC: 3.6 GM/DL (ref 2.4–3.5)
GLUCOSE SERPL-MCNC: 149 MG/DL (ref 74–100)
HCT VFR BLD AUTO: 36.7 % (ref 40–51)
HGB BLD-MCNC: 12.1 GM/DL (ref 13.5–17.5)
IMM GRANULOCYTES # BLD AUTO: 0.12 10*3/UL
IMM GRANULOCYTES NFR BLD AUTO: 1 %
LYMPHOCYTES # BLD AUTO: 0.6 X10(3)/MCL (ref 0.6–4.6)
LYMPHOCYTES NFR BLD AUTO: 8 %
MAGNESIUM SERPL-MCNC: 2.2 MG/DL (ref 1.6–2.6)
MCH RBC QN AUTO: 28.7 PG (ref 26–34)
MCHC RBC AUTO-ENTMCNC: 33 GM/DL (ref 31–37)
MCV RBC AUTO: 87 FL (ref 80–100)
MONOCYTES # BLD AUTO: 0.3 X10(3)/MCL (ref 0.1–1.3)
MONOCYTES NFR BLD AUTO: 4 %
NEUTROPHILS # BLD AUTO: 7.37 X10(3)/MCL (ref 2.1–9.2)
NEUTROPHILS NFR BLD AUTO: 87 %
NRBC BLD AUTO-RTO: 0 % (ref 0–0.2)
PHOSPHATE SERPL-MCNC: 3.4 MG/DL (ref 2.3–4.7)
PLATELET # BLD AUTO: 226 X10(3)/MCL (ref 130–400)
PMV BLD AUTO: 8.5 FL (ref 7.4–10.4)
POTASSIUM SERPL-SCNC: 4.5 MMOL/L (ref 3.5–5.1)
PROT SERPL-MCNC: 7.3 GM/DL (ref 6.4–8.3)
RBC # BLD AUTO: 4.22 X10(6)/MCL (ref 4.5–5.9)
SODIUM SERPL-SCNC: 135 MMOL/L (ref 136–145)
WBC # SPEC AUTO: 8.4 X10(3)/MCL (ref 4.5–11)

## 2021-12-02 ENCOUNTER — HISTORICAL (OUTPATIENT)
Dept: INFUSION THERAPY | Facility: HOSPITAL | Age: 46
End: 2021-12-02

## 2021-12-03 ENCOUNTER — HISTORICAL (OUTPATIENT)
Dept: INFUSION THERAPY | Facility: HOSPITAL | Age: 46
End: 2021-12-03

## 2021-12-06 ENCOUNTER — HISTORICAL (OUTPATIENT)
Dept: INFUSION THERAPY | Facility: HOSPITAL | Age: 46
End: 2021-12-06

## 2021-12-08 ENCOUNTER — HISTORICAL (OUTPATIENT)
Dept: INFUSION THERAPY | Facility: HOSPITAL | Age: 46
End: 2021-12-08

## 2021-12-22 ENCOUNTER — HISTORICAL (OUTPATIENT)
Dept: INFUSION THERAPY | Facility: HOSPITAL | Age: 46
End: 2021-12-22

## 2021-12-22 LAB
ABS NEUT (OLG): 8.52 X10(3)/MCL (ref 2.1–9.2)
ALBUMIN SERPL-MCNC: 3.6 GM/DL (ref 3.5–5)
ALBUMIN/GLOB SERPL: 1.2 RATIO (ref 1.1–2)
ALP SERPL-CCNC: 59 UNIT/L (ref 40–150)
ALT SERPL-CCNC: <5 UNIT/L (ref 0–55)
AST SERPL-CCNC: 8 UNIT/L (ref 5–34)
BASOPHILS # BLD AUTO: 0 X10(3)/MCL (ref 0–0.2)
BASOPHILS NFR BLD AUTO: 0 %
BILIRUB SERPL-MCNC: 0.1 MG/DL
BILIRUBIN DIRECT+TOT PNL SERPL-MCNC: 0 MG/DL (ref 0–0.8)
BILIRUBIN DIRECT+TOT PNL SERPL-MCNC: 0.1 MG/DL (ref 0–0.5)
BUN SERPL-MCNC: 16.7 MG/DL (ref 8.9–20.6)
CALCIUM SERPL-MCNC: 9.3 MG/DL (ref 8.7–10.5)
CHLORIDE SERPL-SCNC: 104 MMOL/L (ref 98–107)
CO2 SERPL-SCNC: 19 MMOL/L (ref 22–29)
CREAT SERPL-MCNC: 0.75 MG/DL (ref 0.73–1.18)
ERYTHROCYTE [DISTWIDTH] IN BLOOD BY AUTOMATED COUNT: 19.5 % (ref 11.5–14.5)
GLOBULIN SER-MCNC: 3 GM/DL (ref 2.4–3.5)
GLUCOSE SERPL-MCNC: 218 MG/DL (ref 74–100)
HCT VFR BLD AUTO: 34.7 % (ref 40–51)
HGB BLD-MCNC: 11.4 GM/DL (ref 13.5–17.5)
IMM GRANULOCYTES # BLD AUTO: 0.12 10*3/UL
IMM GRANULOCYTES NFR BLD AUTO: 1 %
LYMPHOCYTES # BLD AUTO: 0.6 X10(3)/MCL (ref 0.6–4.6)
LYMPHOCYTES NFR BLD AUTO: 6 %
MAGNESIUM SERPL-MCNC: 1.8 MG/DL (ref 1.6–2.6)
MCH RBC QN AUTO: 29.6 PG (ref 26–34)
MCHC RBC AUTO-ENTMCNC: 32.9 GM/DL (ref 31–37)
MCV RBC AUTO: 90.1 FL (ref 80–100)
MONOCYTES # BLD AUTO: 0.3 X10(3)/MCL (ref 0.1–1.3)
MONOCYTES NFR BLD AUTO: 4 %
NEUTROPHILS # BLD AUTO: 8.52 X10(3)/MCL (ref 2.1–9.2)
NEUTROPHILS NFR BLD AUTO: 89 %
NRBC BLD AUTO-RTO: 0 % (ref 0–0.2)
PHOSPHATE SERPL-MCNC: 3.2 MG/DL (ref 2.3–4.7)
PLATELET # BLD AUTO: 251 X10(3)/MCL (ref 130–400)
PMV BLD AUTO: 9.4 FL (ref 7.4–10.4)
POTASSIUM SERPL-SCNC: 4.6 MMOL/L (ref 3.5–5.1)
PROT SERPL-MCNC: 6.6 GM/DL (ref 6.4–8.3)
RBC # BLD AUTO: 3.85 X10(6)/MCL (ref 4.5–5.9)
SODIUM SERPL-SCNC: 134 MMOL/L (ref 136–145)
WBC # SPEC AUTO: 9.6 X10(3)/MCL (ref 4.5–11)

## 2021-12-27 ENCOUNTER — HISTORICAL (OUTPATIENT)
Dept: RADIOLOGY | Facility: HOSPITAL | Age: 46
End: 2021-12-27

## 2022-01-14 ENCOUNTER — HISTORICAL (OUTPATIENT)
Dept: ADMINISTRATIVE | Facility: HOSPITAL | Age: 47
End: 2022-01-14

## 2022-01-14 LAB
ABS NEUT (OLG): 2.49 X10(3)/MCL (ref 2.1–9.2)
ALBUMIN SERPL-MCNC: 3.6 GM/DL (ref 3.5–5)
ALBUMIN/GLOB SERPL: 1 RATIO (ref 1.1–2)
ALP SERPL-CCNC: 49 UNIT/L (ref 40–150)
ALT SERPL-CCNC: 10 UNIT/L (ref 0–55)
AST SERPL-CCNC: 19 UNIT/L (ref 5–34)
BASOPHILS # BLD AUTO: 0 X10(3)/MCL (ref 0–0.2)
BASOPHILS NFR BLD AUTO: 0 %
BILIRUB SERPL-MCNC: 0.2 MG/DL
BILIRUBIN DIRECT+TOT PNL SERPL-MCNC: 0.1 MG/DL (ref 0–0.5)
BILIRUBIN DIRECT+TOT PNL SERPL-MCNC: 0.1 MG/DL (ref 0–0.8)
BUN SERPL-MCNC: 16.7 MG/DL (ref 8.9–20.6)
CALCIUM SERPL-MCNC: 9.7 MG/DL (ref 8.7–10.5)
CHLORIDE SERPL-SCNC: 105 MMOL/L (ref 98–107)
CO2 SERPL-SCNC: 26 MMOL/L (ref 22–29)
CREAT SERPL-MCNC: 0.74 MG/DL (ref 0.73–1.18)
EOSINOPHIL # BLD AUTO: 0 X10(3)/MCL (ref 0–0.9)
EOSINOPHIL NFR BLD AUTO: 0 %
ERYTHROCYTE [DISTWIDTH] IN BLOOD BY AUTOMATED COUNT: 18.2 % (ref 11.5–14.5)
GLOBULIN SER-MCNC: 3.6 GM/DL (ref 2.4–3.5)
GLUCOSE SERPL-MCNC: 84 MG/DL (ref 74–100)
HCT VFR BLD AUTO: 38.4 % (ref 40–51)
HGB BLD-MCNC: 12.3 GM/DL (ref 13.5–17.5)
IMM GRANULOCYTES # BLD AUTO: 0.14 10*3/UL
IMM GRANULOCYTES NFR BLD AUTO: 4 %
LYMPHOCYTES # BLD AUTO: 0.6 X10(3)/MCL (ref 0.6–4.6)
LYMPHOCYTES NFR BLD AUTO: 15 %
MAGNESIUM SERPL-MCNC: 1.9 MG/DL (ref 1.6–2.6)
MCH RBC QN AUTO: 29.6 PG (ref 26–34)
MCHC RBC AUTO-ENTMCNC: 32 GM/DL (ref 31–37)
MCV RBC AUTO: 92.3 FL (ref 80–100)
MONOCYTES # BLD AUTO: 0.7 X10(3)/MCL (ref 0.1–1.3)
MONOCYTES NFR BLD AUTO: 18 %
NEUTROPHILS # BLD AUTO: 2.49 X10(3)/MCL (ref 2.1–9.2)
NEUTROPHILS NFR BLD AUTO: 63 %
NRBC BLD AUTO-RTO: 0 % (ref 0–0.2)
PHOSPHATE SERPL-MCNC: 2.9 MG/DL (ref 2.3–4.7)
PLATELET # BLD AUTO: 217 X10(3)/MCL (ref 130–400)
PMV BLD AUTO: 9.5 FL (ref 7.4–10.4)
POTASSIUM SERPL-SCNC: 4.7 MMOL/L (ref 3.5–5.1)
PROT SERPL-MCNC: 7.2 GM/DL (ref 6.4–8.3)
RBC # BLD AUTO: 4.16 X10(6)/MCL (ref 4.5–5.9)
SODIUM SERPL-SCNC: 137 MMOL/L (ref 136–145)
WBC # SPEC AUTO: 4 X10(3)/MCL (ref 4.5–11)

## 2022-01-25 ENCOUNTER — HISTORICAL (OUTPATIENT)
Dept: INFUSION THERAPY | Facility: HOSPITAL | Age: 47
End: 2022-01-25

## 2022-01-25 LAB
ABS NEUT (OLG): 4.65 X10(3)/MCL (ref 2.1–9.2)
ALBUMIN SERPL-MCNC: 3.5 GM/DL (ref 3.5–5)
ALBUMIN/GLOB SERPL: 1 RATIO (ref 1.1–2)
ALP SERPL-CCNC: 47 UNIT/L (ref 40–150)
ALT SERPL-CCNC: 7 UNIT/L (ref 0–55)
AST SERPL-CCNC: 11 UNIT/L (ref 5–34)
BASOPHILS # BLD AUTO: 0 X10(3)/MCL (ref 0–0.2)
BASOPHILS NFR BLD AUTO: 1 %
BILIRUB SERPL-MCNC: 0.3 MG/DL
BILIRUBIN DIRECT+TOT PNL SERPL-MCNC: 0.1 MG/DL (ref 0–0.5)
BILIRUBIN DIRECT+TOT PNL SERPL-MCNC: 0.2 MG/DL (ref 0–0.8)
BUN SERPL-MCNC: 14.6 MG/DL (ref 8.9–20.6)
CALCIUM SERPL-MCNC: 9.7 MG/DL (ref 8.7–10.5)
CHLORIDE SERPL-SCNC: 106 MMOL/L (ref 98–107)
CO2 SERPL-SCNC: 27 MMOL/L (ref 22–29)
CREAT SERPL-MCNC: 0.82 MG/DL (ref 0.73–1.18)
EOSINOPHIL # BLD AUTO: 0.2 X10(3)/MCL (ref 0–0.9)
EOSINOPHIL NFR BLD AUTO: 2 %
ERYTHROCYTE [DISTWIDTH] IN BLOOD BY AUTOMATED COUNT: 17.1 % (ref 11.5–14.5)
GLOBULIN SER-MCNC: 3.5 GM/DL (ref 2.4–3.5)
GLUCOSE SERPL-MCNC: 78 MG/DL (ref 74–100)
HCT VFR BLD AUTO: 36.5 % (ref 40–51)
HGB BLD-MCNC: 11.7 GM/DL (ref 13.5–17.5)
IMM GRANULOCYTES # BLD AUTO: 0.04 10*3/UL
IMM GRANULOCYTES NFR BLD AUTO: 1 %
LYMPHOCYTES # BLD AUTO: 0.7 X10(3)/MCL (ref 0.6–4.6)
LYMPHOCYTES NFR BLD AUTO: 11 %
MCH RBC QN AUTO: 30.3 PG (ref 26–34)
MCHC RBC AUTO-ENTMCNC: 32.1 GM/DL (ref 31–37)
MCV RBC AUTO: 94.6 FL (ref 80–100)
MONOCYTES # BLD AUTO: 0.7 X10(3)/MCL (ref 0.1–1.3)
MONOCYTES NFR BLD AUTO: 11 %
NEUTROPHILS # BLD AUTO: 4.65 X10(3)/MCL (ref 2.1–9.2)
NEUTROPHILS NFR BLD AUTO: 74 %
NRBC BLD AUTO-RTO: 0.3 % (ref 0–0.2)
PLATELET # BLD AUTO: 191 X10(3)/MCL (ref 130–400)
PMV BLD AUTO: 9.9 FL (ref 7.4–10.4)
POTASSIUM SERPL-SCNC: 4.8 MMOL/L (ref 3.5–5.1)
PROT SERPL-MCNC: 7 GM/DL (ref 6.4–8.3)
RBC # BLD AUTO: 3.86 X10(6)/MCL (ref 4.5–5.9)
SODIUM SERPL-SCNC: 139 MMOL/L (ref 136–145)
T4 FREE SERPL-MCNC: 0.96 NG/DL (ref 0.7–1.48)
TSH SERPL-ACNC: 1.04 UIU/ML (ref 0.35–4.94)
WBC # SPEC AUTO: 6.3 X10(3)/MCL (ref 4.5–11)

## 2022-02-01 ENCOUNTER — HISTORICAL (OUTPATIENT)
Dept: ADMINISTRATIVE | Facility: HOSPITAL | Age: 47
End: 2022-02-01

## 2022-02-01 LAB
ABS NEUT (OLG): 4.5 (ref 2.1–9.2)
ALBUMIN SERPL-MCNC: 3.5 G/DL (ref 3.5–5)
ALBUMIN/GLOB SERPL: 1.1 {RATIO} (ref 1.1–2)
ALP SERPL-CCNC: 36 U/L (ref 40–150)
ALT SERPL-CCNC: <5 U/L (ref 0–55)
AST SERPL-CCNC: 10 U/L (ref 5–34)
BASOPHILS # BLD AUTO: 0 10*3/UL (ref 0–0.2)
BASOPHILS NFR BLD AUTO: 1 %
BILIRUB SERPL-MCNC: 0.4 MG/DL
BILIRUBIN DIRECT+TOT PNL SERPL-MCNC: 0.2 (ref 0–0.5)
BILIRUBIN DIRECT+TOT PNL SERPL-MCNC: 0.2 (ref 0–0.8)
BUN SERPL-MCNC: 10.2 MG/DL (ref 8.9–20.6)
CALCIUM SERPL-MCNC: 9.3 MG/DL (ref 8.7–10.5)
CHLORIDE SERPL-SCNC: 106 MMOL/L (ref 98–107)
CO2 SERPL-SCNC: 24 MMOL/L (ref 22–29)
CREAT SERPL-MCNC: 0.68 MG/DL (ref 0.73–1.18)
EOSINOPHIL # BLD AUTO: 0.3 10*3/UL (ref 0–0.9)
EOSINOPHIL NFR BLD AUTO: 5 %
ERYTHROCYTE [DISTWIDTH] IN BLOOD BY AUTOMATED COUNT: 16.3 % (ref 11.5–14.5)
FLAG2 (OHS): 70
FLAG3 (OHS): 80
FLAGS (OHS): 70
GLOBULIN SER-MCNC: 3.3 G/DL (ref 2.4–3.5)
GLUCOSE SERPL-MCNC: 92 MG/DL (ref 74–100)
HCT VFR BLD AUTO: 34 % (ref 40–51)
HEMOLYSIS INTERF INDEX SERPL-ACNC: 2
HGB BLD-MCNC: 10.9 G/DL (ref 13.5–17.5)
ICTERIC INTERF INDEX SERPL-ACNC: 0
IMM GRANULOCYTES # BLD AUTO: 0.02 10*3/UL
IMM GRANULOCYTES NFR BLD AUTO: 0 %
LOW EVENT # SUSPECT FLAG (OHS): 70
LYMPHOCYTES # BLD AUTO: 0.9 10*3/UL (ref 0.6–4.6)
LYMPHOCYTES NFR BLD AUTO: 14 %
MANUAL DIFF? (OHS): NO
MCH RBC QN AUTO: 30.7 PG (ref 26–34)
MCHC RBC AUTO-ENTMCNC: 32.1 G/DL (ref 31–37)
MCV RBC AUTO: 95.8 FL (ref 80–100)
MO BLASTS SUSPECT FLAG (OHS): 40
MONOCYTES # BLD AUTO: 0.8 10*3/UL (ref 0.1–1.3)
MONOCYTES NFR BLD AUTO: 12 %
NEUTROPHILS # BLD AUTO: 4.5 10*3/UL (ref 2.1–9.2)
NEUTROPHILS NFR BLD AUTO: 69 %
NRBC BLD AUTO-RTO: 0 % (ref 0–0.2)
PLATELET # BLD AUTO: 184 10*3/UL (ref 130–400)
PMV BLD AUTO: 9.6 FL (ref 7.4–10.4)
POTASSIUM SERPL-SCNC: 4.2 MMOL/L (ref 3.5–5.1)
PROT SERPL-MCNC: 6.8 G/DL (ref 6.4–8.3)
RBC # BLD AUTO: 3.55 10*6/UL (ref 4.5–5.9)
SODIUM SERPL-SCNC: 137 MMOL/L (ref 136–145)
WBC # SPEC AUTO: 6.5 10*3/UL (ref 4.5–11)

## 2022-02-22 ENCOUNTER — HISTORICAL (OUTPATIENT)
Dept: INFUSION THERAPY | Facility: HOSPITAL | Age: 47
End: 2022-02-22

## 2022-02-22 LAB
ABS NEUT (OLG): 4.54 (ref 2.1–9.2)
ALBUMIN SERPL-MCNC: 3.4 G/DL (ref 3.5–5)
ALBUMIN/GLOB SERPL: 0.9 {RATIO} (ref 1.1–2)
ALP SERPL-CCNC: 44 U/L (ref 40–150)
ALT SERPL-CCNC: 5 U/L (ref 0–55)
AST SERPL-CCNC: 11 U/L (ref 5–34)
BASOPHILS # BLD AUTO: 0 10*3/UL (ref 0–0.2)
BASOPHILS NFR BLD AUTO: 1 %
BILIRUB SERPL-MCNC: 0.3 MG/DL
BILIRUBIN DIRECT+TOT PNL SERPL-MCNC: 0.1 (ref 0–0.5)
BILIRUBIN DIRECT+TOT PNL SERPL-MCNC: 0.2 (ref 0–0.8)
BUN SERPL-MCNC: 13.2 MG/DL (ref 8.9–20.6)
CALCIUM SERPL-MCNC: 9.6 MG/DL (ref 8.7–10.5)
CHLORIDE SERPL-SCNC: 104 MMOL/L (ref 98–107)
CO2 SERPL-SCNC: 28 MMOL/L (ref 22–29)
CREAT SERPL-MCNC: 0.8 MG/DL (ref 0.73–1.18)
EOSINOPHIL # BLD AUTO: 0.3 10*3/UL (ref 0–0.9)
EOSINOPHIL NFR BLD AUTO: 4 %
ERYTHROCYTE [DISTWIDTH] IN BLOOD BY AUTOMATED COUNT: 13.8 % (ref 11.5–14.5)
FLAG2 (OHS): 70
FLAG3 (OHS): 80
FLAGS (OHS): 80
GLOBULIN SER-MCNC: 3.9 G/DL (ref 2.4–3.5)
GLUCOSE SERPL-MCNC: 80 MG/DL (ref 74–100)
HCT VFR BLD AUTO: 35.4 % (ref 40–51)
HEMOLYSIS INTERF INDEX SERPL-ACNC: 5
HGB BLD-MCNC: 11.6 G/DL (ref 13.5–17.5)
ICTERIC INTERF INDEX SERPL-ACNC: 0
IMM GRANULOCYTES # BLD AUTO: 0.02 10*3/UL
IMM GRANULOCYTES NFR BLD AUTO: 0 %
LOW EVENT # SUSPECT FLAG (OHS): 80
LYMPHOCYTES # BLD AUTO: 0.9 10*3/UL (ref 0.6–4.6)
LYMPHOCYTES NFR BLD AUTO: 13 %
MANUAL DIFF? (OHS): NO
MCH RBC QN AUTO: 30.6 PG (ref 26–34)
MCHC RBC AUTO-ENTMCNC: 32.8 G/DL (ref 31–37)
MCV RBC AUTO: 93.4 FL (ref 80–100)
MO BLASTS SUSPECT FLAG (OHS): 30
MONOCYTES # BLD AUTO: 0.8 10*3/UL (ref 0.1–1.3)
MONOCYTES NFR BLD AUTO: 12 %
NEUTROPHILS # BLD AUTO: 4.54 10*3/UL (ref 2.1–9.2)
NEUTROPHILS NFR BLD AUTO: 70 %
NRBC BLD AUTO-RTO: 0 % (ref 0–0.2)
PLATELET # BLD AUTO: 208 10*3/UL (ref 130–400)
PMV BLD AUTO: 8.8 FL (ref 7.4–10.4)
POTASSIUM SERPL-SCNC: 4.4 MMOL/L (ref 3.5–5.1)
PROT SERPL-MCNC: 7.3 G/DL (ref 6.4–8.3)
RBC # BLD AUTO: 3.79 10*6/UL (ref 4.5–5.9)
SODIUM SERPL-SCNC: 138 MMOL/L (ref 136–145)
WBC # SPEC AUTO: 6.5 10*3/UL (ref 4.5–11)

## 2022-03-18 ENCOUNTER — HISTORICAL (OUTPATIENT)
Dept: ADMINISTRATIVE | Facility: HOSPITAL | Age: 47
End: 2022-03-18

## 2022-03-18 ENCOUNTER — HISTORICAL (OUTPATIENT)
Dept: RADIOLOGY | Facility: HOSPITAL | Age: 47
End: 2022-03-18

## 2022-03-21 DIAGNOSIS — C34.90 SQUAMOUS CELL CARCINOMA OF LUNG, UNSPECIFIED LATERALITY: ICD-10-CM

## 2022-03-22 ENCOUNTER — HISTORICAL (OUTPATIENT)
Dept: INFUSION THERAPY | Facility: HOSPITAL | Age: 47
End: 2022-03-22

## 2022-03-22 LAB
ABS NEUT (OLG): 3.54 (ref 2.1–9.2)
ALBUMIN SERPL-MCNC: 3.5 G/DL (ref 3.5–5)
ALBUMIN SERPL-MCNC: 3.5 G/DL (ref 3.5–5)
ALBUMIN/GLOB SERPL: 0.8 {RATIO} (ref 1.1–2)
ALP SERPL-CCNC: 47 U/L (ref 40–150)
ALP SERPL-CCNC: 48 U/L (ref 40–150)
ALT SERPL-CCNC: <5 U/L (ref 0–55)
ALT SERPL-CCNC: <5 U/L (ref 0–55)
AST SERPL-CCNC: 11 U/L (ref 5–34)
AST SERPL-CCNC: 12 U/L (ref 5–34)
BASOPHILS # BLD AUTO: 0 10*3/UL (ref 0–0.2)
BASOPHILS NFR BLD AUTO: 1 %
BILIRUB SERPL-MCNC: 0.3 MG/DL
BILIRUB SERPL-MCNC: 0.3 MG/DL
BILIRUBIN DIRECT+TOT PNL SERPL-MCNC: 0.1 (ref 0–0.5)
BILIRUBIN DIRECT+TOT PNL SERPL-MCNC: 0.1 (ref 0–0.5)
BILIRUBIN DIRECT+TOT PNL SERPL-MCNC: 0.2 (ref 0–0.8)
BILIRUBIN DIRECT+TOT PNL SERPL-MCNC: 0.2 (ref 0–0.8)
BUN SERPL-MCNC: 11.2 MG/DL (ref 8.9–20.6)
CALCIUM SERPL-MCNC: 9.9 MG/DL (ref 8.7–10.5)
CHLORIDE SERPL-SCNC: 104 MMOL/L (ref 98–107)
CO2 SERPL-SCNC: 26 MMOL/L (ref 22–29)
CREAT SERPL-MCNC: 0.74 MG/DL (ref 0.73–1.18)
EOSINOPHIL # BLD AUTO: 0.5 10*3/UL (ref 0–0.9)
EOSINOPHIL NFR BLD AUTO: 9 %
ERYTHROCYTE [DISTWIDTH] IN BLOOD BY AUTOMATED COUNT: 13.1 % (ref 11.5–14.5)
FLAG2 (OHS): 70
FLAG3 (OHS): 80
FLAGS (OHS): 80
GLOBULIN SER-MCNC: 4.2 G/DL (ref 2.4–3.5)
GLUCOSE SERPL-MCNC: 99 MG/DL (ref 74–100)
HCT VFR BLD AUTO: 37.4 % (ref 40–51)
HEMOLYSIS INTERF INDEX SERPL-ACNC: <0
HGB BLD-MCNC: 12.1 G/DL (ref 13.5–17.5)
ICTERIC INTERF INDEX SERPL-ACNC: 0
ICTERIC INTERF INDEX SERPL-ACNC: 0
IMM GRANULOCYTES # BLD AUTO: 0.02 10*3/UL
IMM GRANULOCYTES NFR BLD AUTO: 0 %
LIPEMIC INTERF INDEX SERPL-ACNC: 1
LOW EVENT # SUSPECT FLAG (OHS): 80
LYMPHOCYTES # BLD AUTO: 0.7 10*3/UL (ref 0.6–4.6)
LYMPHOCYTES NFR BLD AUTO: 13 %
MAGNESIUM SERPL-MCNC: 1.9 MG/DL (ref 1.6–2.6)
MANUAL DIFF? (OHS): NO
MCH RBC QN AUTO: 28.9 PG (ref 26–34)
MCHC RBC AUTO-ENTMCNC: 32.4 G/DL (ref 31–37)
MCV RBC AUTO: 89.5 FL (ref 80–100)
MO BLASTS SUSPECT FLAG (OHS): 40
MONOCYTES # BLD AUTO: 0.7 10*3/UL (ref 0.1–1.3)
MONOCYTES NFR BLD AUTO: 13 %
NEUTROPHILS # BLD AUTO: 3.54 10*3/UL (ref 2.1–9.2)
NEUTROPHILS NFR BLD AUTO: 64 %
NRBC BLD AUTO-RTO: 0 % (ref 0–0.2)
PLATELET # BLD AUTO: 228 10*3/UL (ref 130–400)
PMV BLD AUTO: 9.1 FL (ref 7.4–10.4)
POTASSIUM SERPL-SCNC: 4.1 MMOL/L (ref 3.5–5.1)
PROT SERPL-MCNC: 7.6 G/DL (ref 6.4–8.3)
PROT SERPL-MCNC: 7.7 G/DL (ref 6.4–8.3)
RBC # BLD AUTO: 4.18 10*6/UL (ref 4.5–5.9)
SODIUM SERPL-SCNC: 137 MMOL/L (ref 136–145)
T4 FREE SERPL-MCNC: 0.94 NG/DL (ref 0.7–1.48)
TSH SERPL-ACNC: 1.01 M[IU]/L (ref 0.35–4.94)
WBC # SPEC AUTO: 5.5 10*3/UL (ref 4.5–11)

## 2022-04-05 ENCOUNTER — HISTORICAL (OUTPATIENT)
Dept: ADMINISTRATIVE | Facility: HOSPITAL | Age: 47
End: 2022-04-05

## 2022-04-05 ENCOUNTER — HISTORICAL (OUTPATIENT)
Dept: RADIOLOGY | Facility: HOSPITAL | Age: 47
End: 2022-04-05

## 2022-04-11 ENCOUNTER — HISTORICAL (OUTPATIENT)
Dept: ADMINISTRATIVE | Facility: HOSPITAL | Age: 47
End: 2022-04-11
Payer: MEDICAID

## 2022-04-18 ENCOUNTER — HISTORICAL (OUTPATIENT)
Dept: ADMINISTRATIVE | Facility: HOSPITAL | Age: 47
End: 2022-04-18
Payer: MEDICAID

## 2022-04-18 LAB
ABS NEUT (OLG): 4.25 (ref 2.1–9.2)
ALBUMIN SERPL-MCNC: 3.8 G/DL (ref 3.5–5)
ALBUMIN/GLOB SERPL: 0.8 {RATIO} (ref 1.1–2)
ALP SERPL-CCNC: 52 U/L (ref 40–150)
ALT SERPL-CCNC: 6 U/L (ref 0–55)
AST SERPL-CCNC: 13 U/L (ref 5–34)
BASOPHILS # BLD AUTO: 0 10*3/UL (ref 0–0.2)
BASOPHILS NFR BLD AUTO: 1 %
BILIRUB SERPL-MCNC: 0.3 MG/DL
BILIRUBIN DIRECT+TOT PNL SERPL-MCNC: 0.1 (ref 0–0.5)
BILIRUBIN DIRECT+TOT PNL SERPL-MCNC: 0.2 (ref 0–0.8)
BUN SERPL-MCNC: 16.4 MG/DL (ref 8.9–20.6)
CALCIUM SERPL-MCNC: 10.3 MG/DL (ref 8.7–10.5)
CHLORIDE SERPL-SCNC: 104 MMOL/L (ref 98–107)
CO2 SERPL-SCNC: 28 MMOL/L (ref 22–29)
CREAT SERPL-MCNC: 0.73 MG/DL (ref 0.73–1.18)
EOSINOPHIL # BLD AUTO: 0.3 10*3/UL (ref 0–0.9)
EOSINOPHIL NFR BLD AUTO: 4 %
ERYTHROCYTE [DISTWIDTH] IN BLOOD BY AUTOMATED COUNT: 13.2 % (ref 11.5–14.5)
FLAG2 (OHS): 60
FLAG3 (OHS): 80
FLAGS (OHS): 80
GLOBULIN SER-MCNC: 4.5 G/DL (ref 2.4–3.5)
GLUCOSE SERPL-MCNC: 83 MG/DL (ref 74–100)
HCT VFR BLD AUTO: 43.8 % (ref 40–51)
HEMOLYSIS INTERF INDEX SERPL-ACNC: 1
HGB BLD-MCNC: 13.7 G/DL (ref 13.5–17.5)
ICTERIC INTERF INDEX SERPL-ACNC: 0
IMM GRANULOCYTES # BLD AUTO: 0.01 10*3/UL
IMM GRANULOCYTES NFR BLD AUTO: 0 %
IMM. NE 2 SUSPECT FLAG (OHS): 100
LIPEMIC INTERF INDEX SERPL-ACNC: 4
LOW EVENT # SUSPECT FLAG (OHS): 80
LYMPHOCYTES # BLD AUTO: 0.9 10*3/UL (ref 0.6–4.6)
LYMPHOCYTES NFR BLD AUTO: 15 %
MANUAL DIFF? (OHS): NO
MCH RBC QN AUTO: 26.9 PG (ref 26–34)
MCHC RBC AUTO-ENTMCNC: 31.3 G/DL (ref 31–37)
MCV RBC AUTO: 85.9 FL (ref 80–100)
MO BLASTS SUSPECT FLAG (OHS): 100
MONOCYTES # BLD AUTO: 0.5 10*3/UL (ref 0.1–1.3)
MONOCYTES NFR BLD AUTO: 9 %
NEUTROPHILS # BLD AUTO: 4.25 10*3/UL (ref 2.1–9.2)
NEUTROPHILS NFR BLD AUTO: 71 %
NRBC BLD AUTO-RTO: 0 % (ref 0–0.2)
PLATELET # BLD AUTO: 221 10*3/UL (ref 130–400)
PMV BLD AUTO: 9.2 FL (ref 7.4–10.4)
POTASSIUM SERPL-SCNC: 4.4 MMOL/L (ref 3.5–5.1)
PROT SERPL-MCNC: 8.3 G/DL (ref 6.4–8.3)
RBC # BLD AUTO: 5.1 10*6/UL (ref 4.5–5.9)
SODIUM SERPL-SCNC: 140 MMOL/L (ref 136–145)
WBC # SPEC AUTO: 6 10*3/UL (ref 4.5–11)

## 2022-04-19 ENCOUNTER — HISTORICAL (OUTPATIENT)
Dept: INFUSION THERAPY | Facility: HOSPITAL | Age: 47
End: 2022-04-19
Payer: MEDICAID

## 2022-04-22 ENCOUNTER — HISTORICAL (OUTPATIENT)
Dept: RADIOLOGY | Facility: HOSPITAL | Age: 47
End: 2022-04-22
Payer: MEDICAID

## 2022-04-22 ENCOUNTER — HISTORICAL (OUTPATIENT)
Dept: ADMINISTRATIVE | Facility: HOSPITAL | Age: 47
End: 2022-04-22
Payer: MEDICAID

## 2022-04-27 VITALS
SYSTOLIC BLOOD PRESSURE: 108 MMHG | OXYGEN SATURATION: 97 % | HEIGHT: 65 IN | WEIGHT: 98.31 LBS | DIASTOLIC BLOOD PRESSURE: 76 MMHG | BODY MASS INDEX: 16.38 KG/M2

## 2022-04-30 NOTE — ED PROVIDER NOTES
Patient:   Deepak Pratt             MRN: [id number]            FIN: [id number]               Age:   46 years     Sex:  Male     :  1975   Associated Diagnoses:   Neutrophilic leukocytosis; Hypercalcemia; Hyponatremia; Mass of right lung; Postobstructive pneumonia   Author:   Mo Ortiz      Basic Information   Time seen: Immediately upon arrival.   History source: Patient.   Arrival mode: Private vehicle.   History limitation: None.   Additional information: Chief Complaint from Nursing Triage Note : Chief Complaint   2021 14:36 CDT       Chief Complaint           Pt c/o cough  x 1 year He c/o Shortness of breathe for many months. Pt went to [email or web address] dx with Pneumonia, Left pleural effusion and possible malignancy. Pt talking freely in triage, skin w/d.  .   Provider/Visit info:   Time Seen:  Mo Ortiz / 2021 14:44  .   History of Present Illness   The patient presents with cough, SOB, generalized weakness & fatigue and weight loss.  The onset was chronic.  The course/duration of symptoms is constant.  Character productive: clear.  The degree at onset was moderate.  The degree at present is moderate.  The exacerbating factor is none.  The relieving factor is none.  Risk factors consist of smoking, , not asthma, not diabetes mellitus, not hypertension, not coronary artery disease, not chronic obstructive pulmonary disease and not pneumonia.  Prior episodes: rare.  Therapy today: none.  Associated symptoms: Denies hemoptysis, night sweats, body aches, edema, orthopnea, palpitations, diaphoresis, syncope, denies chest pain, denies sore throat, denies rhinorrhea, denies nasal congestion, denies hoarse voice, denies fever and denies chills.  Additional history: 45 yo M presents to ED c/o 1 year hx of chronic cough & ALFONSO, along w/ approx 20 lb weight loss over past year. Patient has approx 50 pack year smoking hx. Seen at local urgent care earlier today & had CXR  which revealed L hilar cavitary lesion consistent w/ pneumonia vs malignancy along w/ L pleural effusion. Directed to come to ED for further evaluation. Denies TB risk factors.        Review of Systems   Constitutional symptoms:  Negative except as documented in HPI.   Skin symptoms:  No rash,    Eye symptoms:  Negative except as documented in HPI.   ENMT symptoms:  No ear pain, no sinus pain.    Respiratory symptoms:  Negative except as documented in HPI.   Cardiovascular symptoms:  Negative except as documented in HPI.   Gastrointestinal symptoms:  No nausea, no vomiting, no diarrhea.    Genitourinary symptoms:  Negative except as documented in HPI.   Musculoskeletal symptoms:  No Muscle pain, no Joint pain.    Neurologic symptoms:  Negative except as documented in HPI.   Psychiatric symptoms:  Negative except as documented in HPI.   Endocrine symptoms:  No polyuria, no polydipsia.    Hematologic/Lymphatic symptoms:  Negative except as documented in HPI.   Allergy/immunologic symptoms:  No recurrent infections, no impaired immunity.              Additional review of systems information: All other systems reviewed and otherwise negative.      Health Status   Allergies:    Allergic Reactions (Selected)  High  Penicillin- Unknown.,    Allergies (1) Active Reaction  penicillin unknown  .   Medications:  (Selected)   Prescriptions  Prescribed  erythromycin 0.5% ophthalmic ointment: 1 loy, Eye-Left, QID, # 3.5 gm, 0 Refill(s)  ibuprofen 600 mg oral tablet: 600 mg = 1 tab(s), Oral, QID, PRN PRN for pain, not to exceed 3200 mg/day  with food or milk, # 40 tab(s), 0 Refill(s)  Documented Medications  Documented  AMPHETAMINE SALTS 30 MG TAB: mg tab(s), Oral, per nurse's notes.   Immunizations: Per nurse's notes.      Past Medical/ Family/ Social History   Medical history:    No active or resolved past medical history items have been selected or recorded., Reviewed as documented in chart.   Surgical history:    Tonsillectomy  ([id number]) in 1981 at 6 Years., Reviewed as documented in chart.   Family history:    No family history items have been selected or recorded., Reviewed as documented in chart.   Social history:    Social & Psychosocial Habits    Alcohol  07/31/2017  Use: Current    Type: Beer    Frequency: Daily    Has alcohol use interfered with work or home life? No    Do you ever drink more than intended? No    Has anyone been hurt or at risk by your drinking? No    Ready to change: No    Substance Use  07/31/2017  Use: Never    Tobacco  08/06/2021  Use: 10 or more cigarettes (1/    Type: Cigarettes    Patient Wants Consult For Cessation Counseling No    Tobacco use per day: 15    Abuse/Neglect  08/06/2021  SHX Any signs of abuse or neglect No    Feels unsafe at home: No    Safe place to go: Yes  , Reviewed as documented in chart.   Problem list:    Active Problems (2)  ADD (attention deficit disorder)   Tobacco user   .      Physical Examination               Vital Signs   Vital Signs   8/6/2021 15:04 CDT       Peripheral Pulse Rate     82 bpm                             SpO2                      96 %                             Oxygen Therapy            Room air                             Systolic Blood Pressure   122 mmHg                             Diastolic Blood Pressure  77 mmHg                             Mean Arterial Pressure, Cuff              92 mmHg    8/6/2021 14:36 CDT       Temperature Temporal Artery               37.1 DegC                             Peripheral Pulse Rate     95 bpm                             Respiratory Rate          16 br/min                             SpO2                      97 %                             Oxygen Therapy            Room air                             Systolic Blood Pressure   129 mmHg                             Diastolic Blood Pressure  75 mmHg  .      Vital Signs (last 24 hrs)_____  Last Charted___________  Heart Rate Peripheral   82 bpm  (AUG 06 15:04)  Resp Rate          16 br/min  (AUG 06 14:36)  SBP      122 mmHg  (AUG 06 15:04)  DBP      77 mmHg  (AUG 06 15:04)  SpO2      96 %  (AUG 06 15:04)  Weight      48.9 kg  (AUG 06 14:36)  Height      156 cm  (AUG 06 14:36)  BMI      20.09  (AUG 06 14:36)  .   Measurements   8/6/2021 14:36 CDT       Weight Dosing             48.9 kg                             Weight Measured           48.9 kg                             Weight Measured and Calculated in Lbs     107.80 lb                             Height/Length Dosing      156 cm                             Height/Length Measured    156 cm                             Body Mass Index Measured  20.09 kg/m2  .   Basic Oxygen Information   8/6/2021 15:04 CDT       SpO2                      96 %                             Oxygen Therapy            Room air    8/6/2021 14:36 CDT       SpO2                      97 %                             Oxygen Therapy            Room air  .   General:  Alert, no acute distress, not anxious, not ill-appearing.    Skin:  Warm, dry, intact, no pallor, no rash, normal for ethnicity, jaundice.    Head:  Normocephalic, atraumatic.    Neck:  Supple, no tenderness, no JVD.    Eye:  Pupils are equal, round and reactive to light, extraocular movements are intact, normal conjunctiva.    Ears, nose, mouth and throat:  Oral mucosa moist, no pharyngeal erythema or exudate.    Cardiovascular:  Regular rate and rhythm, No murmur, Normal peripheral perfusion, No edema.    Respiratory:  Breath sounds are equal, Symmetrical chest wall expansion, Respirations: Regular, no respiratory distress, Breath sounds: Right, rhonchi present, no wheezes present, Retractions: None.    Chest wall:  No tenderness.   Back:  Nontender, no step-offs.    Musculoskeletal:  No tenderness, no swelling.    Gastrointestinal:  Soft, Nontender, Non distended, Normal bowel sounds.    Neurological:  Alert and oriented to person, place, time, and situation, No focal neurological deficit  observed, CN II-XII intact, normal motor observed, normal speech observed.    Lymphatics:  No lymphadenopathy.   Psychiatric:  Cooperative, appropriate mood & affect, normal judgment.       Medical Decision Making   Documents reviewed:  Emergency department nurses' notes, emergency department records, prior records.    Orders  Launch Order Profile (Selected)   Inpatient Orders  Ordered  Patient Isolation: 08/06/21 14:42:34 CDT, Contact Precautions, Constant Indicator  Patient Isolation: 08/06/21 14:42:34 CDT, Droplet Precautions, Constant Indicator  Saline Lock Insert: 08/06/21 14:47:00 CDT, Stop date 08/06/21 14:47:00 CDT  Ordered (Collected)  CMP: STAT collect, 08/06/21 15:16:34 CDT, BLOOD, Collected, Stop date 08/06/21 14:48:00 CDT, Lab Collect  Ordered (Dispatched)  COVID-19  IDnow: Stat collect, Nasal, 08/06/21 14:48:00 CDT, Stop date 08/06/21 14:48:00 CDT, Nurse collect  Ordered (Exam Ordered)  CT Chest Lungs W Contrast: Stat, 08/06/21 14:47:00 CDT, Lung mass, None, Stretcher, Creatinine if needed per protocol, Rad Type, 08/06/21 14:47:00 CDT  Completed  Automated Diff: Now collect, 08/06/21 14:48:00 CDT, Blood, Collected, Stop date 08/06/21 14:48:00 CDT, Lab Collect, 08/06/21 14:47:00 CDT  CBC w/ Auto Diff: NOW collect, 08/06/21 15:16:34 CDT, BLOOD, Collected, Stop date 08/06/21 14:48:00 CDT, Lab Collect  EKG Adult 12 Lead: 08/06/21 14:48:00 CDT, Stat, Once, 08/06/21 14:48:00 CDT, -1, -1, 08/06/21 14:48:00 CDT.   Electrocardiogram:  Time 8/6/2021 15:08:00, rate 83, normal sinus rhythm, No ST-T changes, EP Interp, The Axis is normal.  , Ectopy None, P wave and NE interval WNL, QT interval WNL, QRS interval incomplete RBBB.    Results review:  Lab results : Lab View   8/6/2021 15:38 CDT       Est Creat Clearance Ser   90.52 mL/min    8/6/2021 14:48 CDT       Sodium Lvl                130 mmol/L  LOW                             Potassium Lvl             4.5 mmol/L                             Chloride                   97 mmol/L  LOW                             CO2                       26 mmol/L                             Calcium Lvl               10.3 mg/dL  HI                             Glucose Lvl               167 mg/dL  HI                             BUN                       9.9 mg/dL                             Creatinine                0.76 mg/dL                             eGFR-AA                   >105                             eGFR-BANDAR                  >105 mL/min/1.73 m2                             Bili Total                0.2 mg/dL                             Bili Direct               0.1 mg/dL                             Bili Indirect             0.10 mg/dL                             AST                       28 unit/L                             ALT                       26 unit/L                             Alk Phos                  110 unit/L                             Total Protein             7.6 gm/dL                             Albumin Lvl               2.5 gm/dL  LOW                             Globulin                  5.1 gm/dL  HI                             A/G Ratio                 0.5 ratio  LOW                             WBC                       18.4 x10(3)/mcL  HI                             RBC                       3.61 x10(6)/mcL  LOW                             Hgb                       9.3 gm/dL  LOW                             Hct                       28.5 %  LOW                             Platelet                  493 x10(3)/mcL  HI                             MCV                       78.9 fL  LOW                             MCH                       25.8 pg  LOW                             MCHC                      32.6 gm/dL                             RDW                       14.3 %                             MPV                       9.0 fL                             Abs Neut                  17.76 x10(3)/mcL  HI                             Neutro Auto               97  %  NA                             Lymph Auto                2 %  NA                             Mono Auto                 0 %  NA                             Basophil Auto             0 %  NA                             Abs Neutro                17.76 x10(3)/mcL  HI                             Abs Lymph                 0.4 x10(3)/mcL  LOW                             Abs Mono                  0.1 x10(3)/mcL                             Abs Baso                  0.0 x10(3)/mcL                             NRBC%                     0.0 %                             IG%                       0 %  NA                             IG#                       0.080  NA    , Interpretation Abnormal results  Hypercalcemia, corrected Ca 11.5. Hyponatremia. Anemia. Thrombocytosis. Leukocytosis w/ L shift.    Radiology results:  Reviewed radiologist's report, emergency physician interpretation: Large R sided lung mass concerning for malignancy, Rad Results (ST)  < 12 hrs   Accession: [id number]  Order: CT Thorax W Contrast  Report Dt/Tm: 08/06/2021 16:39  Report:         CLINICAL:  Lung mass.     TECHNIQUE:  Multidetector axial images were performed from thoracic  inlet to below hemidiaphragms without intravenous contrast  administration.  Sagittal and coronal reformations performed.     Dose length product of 274 mGycm. Automated exposure control was  utilized to minimize radiation dose.     FINDINGS:  The subcarinal location of the mediastinum is expanded by  large heterogeneous mass with anterior posterior diameter of 10.0 cm,  transverse diameter of 6.8 cm and craniocaudal span 7.0 cm. This  causes compressive effect and narrowing of the right mainstem bronchus  and near complete obliteration of the right lower lung lobe bronchi.  This also causes partial narrowing of the main pulmonary artery. There  is large mass consolidation  within the right lower lung lobe. Clear  interface between the right lower lung lobe mass  consolidation and the  mediastinal mass is not established. Within the right lower lung lobe  mass consolidation centrally are cystic cavitary changes. There are  additional separate extensive right lower lung lobe irregularly  defined infiltrates. There are lungs emphysematous bullous changes. No  acute finding the left lung. Small volume right pleural effusion.  There is no left-sided pleural effusion. No pericardial effusion.     Thoracic aorta is without aneurysmal dilatation. Cardiac chambers are  enlarged in size. No pulmonary edema.     Adrenals are not enlarged in size. No acute or aggressive skeletal  abnormality.     IMPRESSION:      1.  Mediastinal subcarinal location large heterogeneous mass may  represent malignant gosia complex and causes compressive effect upon  the right mainstem bronchus and the right lower lung lobe pulmonary  artery branches.   2.  Right lower lung lobe large masslike consolidation with central  cavitations.        .       Reexamination/ Reevaluation   Time: 8/6/2021 17:35:00 .   Vital signs   Basic Oxygen Information   8/6/2021 15:04 CDT       SpO2                      96 %                             Oxygen Therapy            Room air    8/6/2021 14:36 CDT       SpO2                      97 %                             Oxygen Therapy            Room air     Course: progressing as expected, well controlled, VSS throughout ED stay w/o signs of respiratory distress. Due to size of mass & concern for rapid decompensation, will consult IM for evaluation. Additionally, like post-obstructive pneumonia w/ leukocytosis w/ L shift. Blood cultures & lactic ordered. Patient does not meet SIRS criteria. Will allow IM to guide abx therapy.      Impression and Plan   Diagnosis   Neutrophilic leukocytosis (ZQN39-HI D72.9)   Hypercalcemia (QHB87-FJ E83.52)   Hyponatremia (WNX37-WK E87.1)   Mass of right lung (BEO26-ZH R91.8)   Postobstructive pneumonia (ZQP77-YR J18.9)      Calls-Consults   -   8/6/2021 17:33:00 , Franca MILLER, Asma, consult, recommends Case discussed w/ Dr. Schulte & she reviewed today's diagnostic workup & performed face-to-face evaluation at bedside. All of her recommendations have been followed.    -  8/6/2021 17:39:00 , Jessica MILLER, Mauri, IM, phone call, consult, recommends Will evaluate patient in ED for admission.    Plan   Disposition: Admit time  8/6/2021 17:39:00, Admit to Inpatient Telemetry Unit.    Counseled: Patient, Regarding diagnosis, Regarding diagnostic results, Regarding treatment plan, Patient indicated understanding of instructions.    Orders: Launch Orders   Consults:  [facility] ED Consult Internal Medicine (Order): 8/6/2021 17:39 CDT, new-diagnosis lung CA, concern for post-obstructive pneumonia  .       Addendum   I, Dr. Schulte performed a face to face evaluation of this Pt- Deepak Pratt  and my physical exam/history is as below.  This case was initially evaluated by DONALD Carrillo   under my supervision and I agree with his documentation, procedures and plan. I personally performed the Medical Decision Making for this patient    I  participated in the following activities of this patients care: the medical history.   I personally performed: the physical exam, the medical decision making.   The case was discussed with: MARTITA Carrillo   Evaluation and management service: I agree with the evaluation and management decisions made in this patients care.   Results interpretation: I agree with the study interpretation in this patient's care.     HPI:    45 y/o male presents to the ER with complaint of cough, on and off for  while.  He went to urgent care who performed him  a chest XR and referred him to the ER for further evaluation.     Physical Exam:   General: Awake, alert, no distress.  Skin: normal for ethnicity, intact, dry.   Chest: S1, S2 are audible.  There is no S3 no S4, no murmurs.  Lungs : bilateral rhonchi present  Abdomen is soft, nondistended, nontender.  Bowel sounds are  audible.  CNS: No focal deficit, GCS 15  Capillary refill is less than 2 seconds.  Peripheral pulses are palpable bilaterally symmetrical     Management and Plan:     Labs and imaging reviewed. Medicine consulted for admission.  Pt agrees to be admitted.

## 2022-05-05 NOTE — HISTORICAL [facility] CERNER
This is a historical note converted from Sami. Formatting and pictures may have been removed.  Please reference Sami for original formatting and attached multimedia. Indication for Surgery  Squamous cell carcinoma of the lung, need for chemotherapy  Preoperative Diagnosis  Squamous cell carcinoma of the lung  Postoperative Diagnosis  Squamous?cell carcinoma of the lung  Operation  Insertion of tunneled central?venous catheter?with port  Surgeon(s)  Grider- Staff  Srinivasan- Resident  Fagan- Resident  Anesthesia  MAC, see anesthesia record  Estimated Blood Loss  Less than 5 mL  Urine Output  None  Specimen(s)  None  Complications  None  Technique  After proper consent was obtained, patient was brought into operating room?and placed on the table in the supine position.? MAC anesthesia was administered.? The patient was prepped and draped in the standard sterile fashion.? 1% lidocaine with epi?was used to locally anesthetize?inferior to the left clavicle?by the deltopectoral groove.? A large bore needle was used to attempt to access the left subclavian vein, however?we could not?establish?access to the vein?using this approach?as the?proximity of patients?first rib and clavicle?allowed for only a small space in which to attempt access, so the decision to convert to a left?IJ?was made.? 1% lidocaine with epi was used to locally anesthetize?the left neck?and ultrasound guidance was used?to locate?the left jugular vein.? A large bore needle was used to establish venous access, and a guidewire was inserted into the needle. ?X-ray confirmed location of wire in the?right atrium.??A 3 cm skin incision was made?2 fingerbreadths below the left clavicle and?Bovie electrocautery was used to dissect through the subcutaneous tissue down to the pectoral fascia.? A pocket was created using blunt dissection?and hemostasis was achieved.? A skin tunnel was created?between this pocket and the venipuncture site?and a catheter was  threaded?through. A dilator sheath was inserted over the wire?under direct fluoroscopic visualization?and advanced?until hubbed.? The wire?was then removed?the catheter was threaded through the dilator sheath?until?it was completely?underneath the skin,?then the dilator sheath with was removed.? X-ray confirmed?correct location and no?twisting or kinks?in the catheter.? The MediPort was attached to the catheter and inserted into the skin pocket?and fixed to the pectoral fascia?at 2 points with?PDS suture.? The?port was flushed with saline?followed by heparin.? Interrupted Vicryl suture was used to?close the subcutaneous tissue of the skin pocket,?followed by a running subcuticular?with Monocryl to close the skin.? The venipuncture site was closed with a single Monocryl?interrupted stitch.? Dermabond was applied to both sites.? The patient was awoken without complication?and taken to PACU?where an x-ray confirmed?location of the catheter, MediPort, and no pneumothorax.? Dr. Grider was available for the entire procedure.  ?  Caty Fagan MD  [facility] General Surgery, HO-1  ?  This certifies I was present during the entire period between opening and closing of the surgical field.  ?  ?  Dante Grider MD

## 2022-05-05 NOTE — HISTORICAL [facility]
This is a historical note converted from [employer]. Formatting and pictures may have been removed.  Please reference [employer] for original formatting and attached multimedia. Admit and Discharge Dates  Admit Date: 08/06/2021  Discharge Date: 08/07/2021  Physicians  Attending Physician - Ander MILLER, Pinky LIMA  Admitting Physician - Ander MILLER, Pinky LIMA  Primary Care Physician - No PCP, No  Discharge Diagnosis  Cough?[id number]  Hypercalcemia?E83.52  Hyponatremia?E87.1  Mass of right lung?R91.8  Neutrophilic leukocytosis?D72.9  Postobstructive pneumonia?J18.9  Shortness of breath?[id number]  Surgical Procedures  No procedures recorded for this visit.  Immunizations  No immunizations recorded for this visit.  Significant Findings  ?  45yo  male presented to [facility] ED due to consistent shortness of breath for about one year. He reports that his shortness of breath has not worsened over time but is bothersome because it has not gone away. He reports mild exacerbation when he exerts himself and no identifiable relieving factors. This shortness of breath has been associated with non-productive cough, fatigue, anorexia, night sweats, occasional fever,?and 20lb unintentional weight loss. The patient denies having any chronic medical problems aside from ADD for which he took prescribed Adderall until about 2 months ago. He denies hemoptysis,?orthopnea, chest pain, edema, sore throat, voice changes, diaphoresis, irritability, or insomnia.?The patient?has no?recent travel or sick contacts and has never been incarcerated. He lives at home with his wife. ?Patient has been smoking?since he was in high school. ?He has a 60-pack-year history. ?CMP showed hypercalcemia.? Physical exam showed?significant clubbing.? Crackles in rumbles?were heard?on the lung exam.? CT thorax with contrast?showed?large?mediastinal?? heterogeneous mass may represent malignant gosia complex and??Right lower  lung lobe large masslike consolidation with central  cavitations. ?Patient has?large mass which is superimposed?by?obstructive pneumonia.?? patient?is getting Levaquin?for his pneumonia.? Patient is satting?in the 95%?on room air. ?Parathyroid hormone related peptide and parathyroid hormone level?was ordered?to narrow the differential.? Serum osmolarity?and urine osmolarity?was also ordered.? Patient is to?go to the lung mass clinic?to?biopsy test. ?Patient is stable?at this time and denies any other complaints such as chest pain, nausea, vomiting, fever, chills. ?Patient is being discharged?with Levaquin?and is to follow-up?outpatient?at the lung mass clinic for the biopsy?at this time. ?Patient is to follow-up?post wards at the [facility] clinic. ?Patient was instructed to?go to the nearest ED?or go to the clinic?if symptoms worsen?or in any emergency.  ?  Disposition: Patient receiving?Levaquin?for his?pneumonia, outpatient lung mass clinic follow-up,?PFT and PET scan?should be done for the patient  ?  Time Spent on discharge  >35 min  Objective  Vitals & Measurements  T:?36.6? ?C (Oral)? TMIN:?36.5? ?C (Oral)? TMAX:?36.7? ?C (Oral)? HR:?85(Peripheral)? HR:?83(Monitored)? RR:?20? BP:?124/75? SpO2:?95%? WT:?49?kg? BMI:?18?  Physical Exam  General:?cachectic male in?no acute distress  Eye: PERRLA, EOMI, clear conjunctiva, eyelids normal  HENT:?TMs/ear canals clear, oropharynx without erythema/exudate, oropharynx and nasal mucosal surfaces moist  Neck: full range of motion, no thyromegaly or lymphadenopathy  Respiratory:?left lung clear to auscultation, +crackles in the R upper, middle, and lower lung fields  Cardiovascular: regular rate and rhythm without murmurs  Gastrointestinal:?soft, non-tender, non-distended with normal bowel sounds, without masses to palpation  Genitourinary: no CVA tenderness to palpation  Musculoskeletal:?full range of motion of all extremities/spine without limitation or  discomfort  Integumentary: no rashes or skin lesions present, +moderate digital clubbing  Neurologic: cranial nerves intact, no signs of peripheral neurological deficit, motor/sensory function intact  Patient Discharge Condition  Stable  Discharge Disposition  Stable and discharged to home. ?Patient is to go to the lung mass clinic,?take his Levaquin for pneumonia, do PET scan and PFT scan for the patient   Discharge Medication Reconciliation  Prescribed  levoFLOXacin (levofloxacin 750 mg oral tablet)?750 mg, Oral, q24hr  Continue  amphetamine-dextroamphetamine (AMPHETAMINE SALTS 30 MG TAB)??mg, Oral  erythromycin ophthalmic (erythromycin 0.5% ophthalmic ointment)?1 loy, Eye-Left, QID  ibuprofen (ibuprofen 600 mg oral tablet)?600 mg, Oral, QID, PRN for pain  Education and Orders Provided  Community-Acquired Pneumonia, Adult, Easy-to-Read  Discharge - 08/07/21 9:15:00 CDT, Home?  Follow up  Follow-up with PCP in 3 to 5 days  Keep scheduled appointment  [facility], within 2 days, on  ????f/u with Dr. Leung at the clinic  Car Seat Challenge  No Qualifying Data

## 2022-05-05 NOTE — HISTORICAL [facility] CERN[facility]
This is a historical note converted from [employer]. Formatting and pictures may have been removed.  Please reference [employer]i for original formatting and attached multimedia. Chief Complaint  Lung cancer  History of Present Illness  Problem List:  -Squamous cell carcinoma of lung, stage III  -Microcytic anemia (relative iron deficiency)  -Hypercalcemia of malignancy  -Thrombocytosis  -Hyponatremia (SIADH)  ?   Current Treatment:  Cisplatin 75 mg/m? day 1+ docetaxel 75 mg/m? day 1; every 21 days x 4 cycles  Start 10/18/201  ?   Treatment History:  ?   History of Present Illness:  Past medical history: Attention deficit disorder. Tobacco abuse. Tonsillectomy.  Social history:?. ?Lives in [place], [place]. ?Has 2 children.??Has worked as a  all his life?(more than 30-35 years).??Smoked 1-1/2 packs of cigarettes daily for 15 years;?then, 1 pack daily for 1 year;?now, for last 2 months, 2 cigarettes daily.? Heavy alcohol use in the past;?8-10 beers daily?for 10 years;?quit 1-1/2 years ago.? No illicit drugs.  Family history:?No family members with cancer.  Health maintenance:?So far, does not have a primary care physician.? Apparently, he is going to see a primary care physician for the first time next week.  ?   Interval History 10/25/2021: Patient?presents today?along with his wife. He reports symptoms of weakness, diarrhea, abdominal cramps, worsening radiation burn to back, severe fatigue, leg pain/cramp, He says he takes Zofran?on a scheduled basis but continue to?experience nausea and?abdominal cramps. He also says that diarrhea?recently started and he?has not taken anything for it. Lab work reviewed with patient, Na 132, Wbc 0.7, Hgb 12.2,?Hct 37.3,?Anc 0.19. Hence, patient neutropenic we will give Neupogen x5 days and Cipro. Neutropenic precautions discussed with patient. Verbalized understanding. ?Medications reviewed, Discussed with?patient and his wife medications that will be sent to  pharmacy. Both amenable to plan discussed. Due to low Na level we will also give hydration and some Decadron to help with weakness and radiation burn.?Denies any further questions or concerns.?  Review of Systems  A complete 12-point ROS was performed in full with pertinent positives as described in interval history. Remainder of ROS done in full and are negative.  ?  Physical Exam  Vitals & Measurements  T:?36.8? ?C (Oral)? HR:?122(Peripheral)? RR:?20? BP:?112/84?  HT:?164.80?cm? WT:?46.900?kg? BMI:?17.27?  Physical Exam:  General: Alert and oriented, No acute distress.?  Appearance: Well-groomed wearing mask  HEENT: Normocephalic, Oral mucosa is moist. Pupils are equal, round and reactive to light, Extraocular movements are intact, Normal conjunctiva.?  Neck: Supple, Non-tender, No lymphadenopathy, No thyromegaly.?  Respiratory: Lungs are clear to auscultation, Respirations are non-labored, Breath sounds are equal, Symmetrical chest wall expansion.?  Cardiovascular: Normal rate, Regular rhythm, No edema.?  Breast: Breast exam not performed on todays visit.?  Gastrointestinal: Rounded, Soft, Non-tender, Non-distended, Normal bowel sounds.?  Musculoskeletal: Normal strength. Ambulates without assistance  Integumentary: Warm, dry, intact.?  Neurologic: Alert, Oriented, No focal deficits, Cranial Nerves II-XII are grossly intact.?  Cognition and Speech: Oriented, Speech clear and coherent.?  Psychiatric: Cooperative, Appropriate mood & affect.?  ECOG Performance Scale:?0 - No restrictions  Assessment/Plan  1.?Malignant neoplasm of unspecified part of right bronchus or lung?C34.81  1.?Malignant neoplasm of unspecified part of right bronchus or lung?C34.81  ?Locally advanced, obviously inoperable squamous cell carcinoma of lung;  large, 10 cm mediastinal mass, possibly arising in the right lower lung lobe, causing compressive effects on?right mainstem bronchus and right pulmonary artery;  no metastasis;  s/p  bronchoscopic biopsy of carinal?mass 08/18/2021;  large fungating mass at maximiliano, obliterating the right mainstem bronchus and partially occluding left mainstem;  causing hyponatremia, hypercalcemia;  worsening dyspnea, fever, postobstructive pneumonia  ?  #?Squamous cell carcinoma of lung:  -Bronchoscopy (08/18/2021). ?CT chest with contrast (08/06/2021). ?CT A/P without contrast (08/06/2021. ?Brain MRI (09/10/2021). ?Bone scan (09/11/2021).  -10.0 x 6.8 x 7.0 cm subcarinal mass, compressive effect upon right mainstem bronchus and right lower lobe pulmonary artery branches, complete obliteration of right lower lobe bronchi, partial narrowing of the main pulmonary artery  -Right lower lung lobe large masslike consolidation with central cavitations, without clear interface between the right lower lung lobe mass consolidation and the mediastinal mass  -On bronchoscopy, large fungating mass at maximiliano, obliterating the right mainstem bronchus and partially occluding left mainstem  -No metastasis on CTs, bone scan, brain MRI  -Causing postobstructive pneumonia and hospitalization  -S/p laser tumor debulking at [facility] 08/27/2021  -Causing hyponatremia secondary to SIADH and hypercalcemia  -Semiurgently, started on radiation therapy, pending concurrent chemotherapy,?given?complication of right mainstem bronchus, complete obliteration of right lower lobe bronchi, causing postobstructive pneumonia, as well as to prevent?precipitous?respiratory insufficiency?(subsequently, with good?subjective and objective response)  >>  -Tumor >7 cm; tumor invading mediastinum; therefore, cT4  -Mediastinal mass, malignant gosia complex versus extension of right lower lobe mass, therefore, cN2 or cN3  -M0  >>> ?Stage IIIB or stage IIIC  -Completed 2 weeks of hypofractionated?palliative radiation therapy 09/30/2021;?exertional dyspnea improved)  (Excellent subjective and objective response to radiotherapy)  (Did not receive concurrent  chemotherapy?with RT)  -25 pound weight loss over 1 year, including 15 pound weight loss?in last 3 months; anorexic  -09/20/2021:?ECOG 2; exertional dyspnea has improved with palliative radiation therapy?(completed?2 weeks of hypofractionated?radiotherapy?09/30/2021)  -09/30/2021: PET/CT (comparison: CT C/A/P 08/06/2021): Hypermetabolic subcarinal mass with FDG avid soft tissue extending to the right hilar region (5 cm subcarinal mass, extends along the right mainstem bronchus and right upper lobe bronchus with the right perihilar region); possible 8 mm FDG avid lymph node anterior to the right mainstem bronchus; no distant sites of FDG avid metastatic disease; multifocal right mid and lower lung consolidation with no hypermetabolic component appreciated; small to moderate right pleural effusion  ?(PET/CT:?Subcarinal mass 5 cm, was >7 cm to start with, thus, shrunk with radiation treatment; 8 mm?mediastinal lymph node; no distant metastasis)  ?  ?   #?Molecular markers:  -EGFR mutation negative; PD-L1 negative (TPS 0%); KRAS mutation negative; ALK gene rearrangement negative; ROS1 gene rearrangement negative; NTRK NGS fusion panel negative  ??  ?  #?Microcytic anemia (relative iron deficiency +/- anemia of chronic disease/malignancy):  -Hemoglobin 6.8-9.3  -MCV 79.7  -MCH, MCHC low  -Stool for occult blood negative x2 (09/2021)  -B12 normal, 7-55 (09/06/2021)  -Serum iron 9, TIBC 157, transferrin saturation 6%, ferritin 795.5 (09/06/2021)  -Folate low, 6.5 (09/06/2021  -09/30/2021: Serum iron 24, TIBC 220, transferrin saturation 11%, ferritin 714.65 (anemia of chronic disease/relative iron deficiency); B12 level 1046; RBC folate 1147; hemoglobin 10.5  ??  ?  #?Hypercalcemia of malignancy:  Calcium level:  -08/06/2021: 10.3 (albumin 2.5)  -9.6-10.2 between 08/07/2021-09/08/2021  -09/09/2021: 10.5 (albumin 1.8)  -09/12/2021: 10.6 (albumin 2.2)  -09/21/2021: 8.2 (albumin 2.1)  -08/07/2021: Intact PTH level normal,  12.7; intact PTH level <2.0  -09/10/2021: PTH needed peptide <2.0  -Zometa 4 mg IV x1 (09/12/2021)?>> hypercalcemia resolved?  -09/30/2021:?Ionized calcium level 5.1, normal (Serum calcium 9.3, albumin 2.6, corrected calcium 10.42)  ??  ?  #?Thrombocytosis:  -Platelet count 413-588K  -Almost certainly, secondary to underlying malignancy and relative iron deficiency  -09/30/2021: ELDON-2 mutation negative  ?  ?  #?Hyponatremia secondary to SIADH:  -Sodium 128-130  -Treated with fluid restriction, salt tablets (by nephrology)-on hold per patient report  ?  ?   ?Plan:  -No distant metastases on PET/CT; subcarinal mass 5 cm, has shrunk with radiation treatment  ?   -Whether stage IIIB or stage IIIC, treatment remains the same: Definitive concurrent chemoradiation therapy, followed by consolidation immunotherapy with durvalumab  -Completed 14 days of?hypofractionated?palliative radiotherapy 09/30/2021;?did not receive concurrent chemotherapy  -Patient has already received radiation therapy for stage III squamous cell carcinoma of lung, without concurrent chemotherapy  >>>  -Now, will administer (sequential) adjuvant chemotherapy  -Cisplatin 75 mg/m? day 1+ docetaxel 75 mg/m? day 1; every 21 days x 4 cycles  -No distant metastases on PET/CT, therefore,?will avoid?palliative?chemotherapy regimen at this time  ?   -After completion of adjuvant?(sequential) chemotherapy,?will initiate surveillance appropriate for?stage III non-small cell lung cancer  -Repeat contrast-enhanced CT scans of C/A/P?in 3 months (end of December/early January 22)  ?   -Check for?BRAF mutation; RET rearrangement  ?  -Microcytic anemia  -Anemia of chronic disease/relative iron deficiency  >>>  -Proceed with a trial of parenteral iron therapy (Feraheme 510 mg IV x2; assess response with repeat CBC and iron studies 4-6 weeks later)  ?  Hypercalcemia of malignancy  -S/p Zometa 4 mg IV x1 (09/12/2021)  >>>  Follow calcium level  ?  Thrombocytosis  -Almost  certainly, reactive (iron deficiency anemia, malignancy)  -JAK2 mutation negative  >>>  Check?BCR-ABL 1 fusion transcript testing  ?  Hyponatremia secondary to SIADH secondary to malignancy  >>>  -Managed by renal; Salt tablets on hold  -Continue fluid restriction, will monitor sodium level  ?  -09/30/2021:?Malnutrition; anorexia;?35 pound weight loss in 1 year including 15 pound weight loss?in?last 3 months  -We will start Megace 800 mg p.o. daily to boost appetite  -Advised him to follow?nutritionists?advice ([employer])?in terms of Ensure/boost intake?to prevent any more weight loss  ?  ?  -Start Neupogen 480mcg SQ x5 days  -Cipro 500 bid x7 days sent to pharmacy  -Gabapentin 300 tid prn for neuropathy  -lomotil prn for diarrhea  ?  -RTC in 2 weeks (11/8/2021)?with CBC,CMP,MG,PHOS (Infusion nurses to review) If lab work is normal may proceed with Cycle 2 Cisplatin/Taxotere  ?  -Follow up w/NP in 5 weeks (11/29/2021)?with CBC,CMP,MG,PHOS prior to cycle 3 Cisplatin/Taxotere  ?  -CT C/A/P scheduled for 12/14/2021 will reschedule today for a later date late December early January-ordered today  ?  -Continue to follow up with Renal for SIADH  ?  Above discussed at length with him and his wife. ?All questions answered.  Discussed labs and scans and gave him copies of relevant reports.  Clearly and candidly discussed the following:?That he has advanced stage lung cancer;?that it appears to be essentially comparable; that treatment is purely palliative; that response with treatment cannot be guaranteed.  During chemotherapy, check CBC and CMP every 10 days or so?to monitor for toxicity.  ?  He and his wife understand and agree with this plan, and were appreciative.  Ordered:  ?   Problem List/Past Medical History  Ongoing  ADD (attention deficit disorder)  Cachexia  Iron deficiency anemia  Lung mass  Malignant neoplasm of unspecified part of right bronchus or lung  SIADH (syndrome of inappropriate ADH  production)  Tobacco user  Historical  No qualifying data  Procedure/Surgical History  Catheter Insertion Mediport (None) (10/11/2021)  Fluoroscopy of Superior Vena Cava using Low Osmolar Contrast, Guidance (10/11/2021)  Insertion of Infusion Device into Superior Vena Cava, Percutaneous Approach (10/11/2021)  Insertion of tunneled centrally inserted central venous access device, with subcutaneous port; age 5 years or older (10/11/2021)  Transfusion of Autologous Red Blood Cells into Peripheral Vein, Percutaneous Approach (09/09/2021)  Bronchoscopy w/ Endobronch Biopsy(s) (08/18/2021)  Bronchoscopy, rigid or flexible, including fluoroscopic guidance, when performed; with bronchial or endobronchial biopsy(s), single or multiple sites (08/18/2021)  Extraction of Shayna, Via Natural or Artificial Opening Endoscopic, Diagnostic (08/18/2021)  Tonsillectomy (1981)   Medications  acetaminophen-oxycodone 325 mg-7.5 mg oral tablet, 1 tab(s), Oral, TID, PRN  albuterol 90 mcg/inh inhalation aerosol, 1 puff(s), INH, Once, PRN  CCA Normal Saline (0.9% NS) - 500 mL, 500 mL, IV Piggyback, Once-chemo  Cipro 500 mg oral tablet, 500 mg= 1 tab(s), Oral, q12hr  Decadron (for IV Push), 20 mg, IV Push, Once-chemo  Decadron 4 mg oral tablet, 8 mg= 2 tab(s), Oral, BID, 3 refills  famotidine 20 mg oral tablet, 20 mg= 1 tab(s), Oral, BID  gabapentin 300 mg oral capsule, 300 mg= 1 cap(s), Oral, TID  Lomotil 2.5 mg-0.025 mg oral tablet, 1 tab(s), Oral, QID, PRN, 2 refills  megestrol 40 mg/mL oral suspension, 800 mg= 20 mL, Oral, Daily  Pepcid (for IV Push), 20 mg, IV Slow, Once-chemo  silver sulfADIAZINE 1% topical cream, 1 loy, TOP, Daily  Zofran ODT 8 mg oral tablet, disintegrating, 8 mg= 1 tab(s), Oral, TID, PRN, 1 refills  Allergies  penicillin?(unknown)  Social History  Abuse/Neglect  No, No, Yes, 10/25/2021  Alcohol  Current, 10/25/2021  Employment/School  Unemployed, 08/16/2021  Exercise  Exercise duration: 0.,  08/16/2021  Financial/Legal Situation  None, 08/16/2021  Home/Environment  Lives with Spouse., 08/16/2021    Never in , 08/16/2021  Nutrition/Health  Regular, Fair, 08/16/2021  Spiritual/Cultural  Mandaeism, 08/16/2021  Substance Use  Never, 10/25/2021  Tobacco  Former smoker, quit more than 30 days ago, N/A, 10/25/2021  Immunizations  Vaccine Date Status   tetanus/diphtheria/pertussis, acel(Tdap) 07/31/2017 Given   Health Maintenance  Health Maintenance  ???Pending?(in the next year)  ??? ??Due?  ??? ? ? ?Lipid Screening due??10/25/21??Unknown Frequency  ??? ??Due In Future?  ??? ? ? ?Obesity Screening not due until??01/01/22??and every 1??year(s)  ??? ? ? ?Smoking Cessation not due until??01/01/22??and every 1??year(s)  ??? ? ? ?Alcohol Misuse Screening not due until??01/02/22??and every 1??year(s)  ??? ? ? ?ADL Screening not due until??08/16/22??and every 1??year(s)  ??? ? ? ?Aspirin Therapy for CVD Prevention not due until??10/05/22??and every 1??year(s)  ???Satisfied?(in the past 1 year)  ??? ??Satisfied?  ??? ? ? ?ADL Screening on??08/16/21.??Satisfied by Marcia Nobles  ??? ? ? ?Alcohol Misuse Screening on??10/05/21.??Satisfied by Moira Rojas  ??? ? ? ?Aspirin Therapy for CVD Prevention on??10/05/21.??Satisfied by Moira Rojas  ??? ? ? ?Blood Pressure Screening on??10/25/21.??Satisfied by Natalia Isaac  ??? ? ? ?Body Mass Index Check on??10/25/21.??Satisfied by Natalia Isaac  ??? ? ? ?Depression Screening on??10/25/21.??Satisfied by Natalia Isaac  ??? ? ? ?Diabetes Screening on??10/25/21.??Satisfied by Yaw Johnson.  ??? ? ? ?Hypertension Management-Blood Pressure on??10/25/21.??Satisfied by Natalia Isaac  ??? ? ? ?Influenza Vaccine on??10/25/21.??Satisfied by Natalia Isaac  ??? ? ? ?Obesity Screening on??10/25/21.??Satisfied by Natalia Isaac  ??? ? ? ?Smoking Cessation on??09/08/21.??Satisfied by VIRAJ Bolton, Tracie  ?  Lab Results  Test Name Test Result Date/Time Comments   Sodium Lvl 132 mmol/L  (Low) 10/25/2021 07:34 CDT    Potassium Lvl 3.9 mmol/L 10/25/2021 07:34 CDT    Chloride 103 mmol/L 10/25/2021 07:34 CDT    CO2 21 mmol/L (Low) 10/25/2021 07:34 CDT    Calcium Lvl 9.7 mg/dL 10/25/2021 07:34 CDT    Magnesium Lvl 1.80 mg/dL 10/25/2021 07:34 CDT    Glucose Lvl 121 mg/dL (High) 10/25/2021 07:34 CDT    BUN 17.8 mg/dL 10/25/2021 07:34 CDT    Creatinine 0.67 mg/dL (Low) 10/25/2021 07:34 CDT    Est Creat Clearance Ser 118.04 mL/min 10/25/2021 08:34 CDT    eGFR-AA >105 10/25/2021 07:34 CDT    eGFR-BANDAR >105 mL/min/1.73 m2 10/25/2021 07:34 CDT    Bili Total 0.3 mg/dL 10/25/2021 07:34 CDT    Bili Direct 0.2 mg/dL 10/25/2021 07:34 CDT    Bili Indirect 0.10 mg/dL 10/25/2021 07:34 CDT    AST 14 unit/L 10/25/2021 07:34 CDT    ALT 13 unit/L 10/25/2021 07:34 CDT    Alk Phos 45 unit/L 10/25/2021 07:34 CDT    Total Protein 7.3 gm/dL 10/25/2021 07:34 CDT    Albumin Lvl 3.3 gm/dL (Low) 10/25/2021 07:34 CDT    Globulin 4.0 gm/dL (High) 10/25/2021 07:34 CDT    A/G Ratio 0.8 ratio (Low) 10/25/2021 07:34 CDT    Phosphorus 3.5 mg/dL 10/25/2021 07:34 CDT    WBC 0.7 x10(3)/mcL (Critical) 10/25/2021 07:34 CDT Critical result called to Danielle Kauffman on _10/25/2021 08:31:28 CDT and verified by verbal readback./lmd   RBC 4.54 x10(6)/mcL 10/25/2021 07:34 CDT    Hgb 12.2 gm/dL (Low) 10/25/2021 07:34 CDT    Hct 37.3 % (Low) 10/25/2021 07:34 CDT    Platelet 158 x10(3)/mcL 10/25/2021 07:34 CDT    MCV 82.2 fL 10/25/2021 07:34 CDT    MCH 26.9 pg 10/25/2021 07:34 CDT    MCHC 32.7 gm/dL 10/25/2021 07:34 CDT    RDW 23.2 % (High) 10/25/2021 07:34 CDT    MPV 8.9 fL 10/25/2021 07:34 CDT    Abs Neut 0.19 x10(3)/mcL (Low) 10/25/2021 07:34 CDT

## 2022-05-05 NOTE — HISTORICAL [facility] CERNER
This is a historical note converted from [employer]. Formatting and pictures may have been removed.  Please reference [employer] for original formatting and attached multimedia. I have seen and examined the patient and there are no changes to the below H&P. Pt is ready for mediport placement, is consented and all questions answered. Pt has no palpable lymph nodes or masses in neck or chest bilaterally. He is cachetic.  ?   Caty Fagan MD  [facility] General Surgery HO-1  ?  ?   Chief Complaint  Consult for mediport  History of Present Illness  Mr Pratt is a 46-year-old M?male with pmhx of tobacco use and?stage 3B SSC lung cancer, s/p?laser tumor debulking ?8/30/21 and currently undergoing radiation therapy. He is here for evaluation of Mediport placement to?for chemotherapy. He has no significant cardiac hx, no clotting disorders, and no reactions to anesthesia.Allergic to PCN. No CP or SOB. No crepitus was?noted to the clavicles and there is no obstructions to the upper chest preventing placement. Of note, he was prescribed xarelto this morning by his PCP for VTE ppx and was instructed to ask us when we recommended starting the xarelto, pending scheduling of surgery.  Review of Systems  Constitutional:?no fevers/chills  Respiratory:?no cough, no wheezing, no shortness of breath  Cardiovascular:?no chest pain, no palpitations, no edema  Gastrointestinal:?no nausea, vomiting, or diarrhea. No abdominal pain  Musculoskeletal:?no muscle or joint pain, no joint swelling  Integumentary:?no skin rash or abnormal lesion  Neurologic: no headache, no dizziness, no weakness or numbness  ?  Physical Exam  ???Vitals & Measurements  ??T:?36.7? ?C (Oral)? HR:?92(Peripheral)? RR:?20? BP:?108/76? SpO2:?97%?  ??HT:?165.00?cm? WT:?44.600?kg? BMI:?16.38?  Gen:?NAD, afebrile  CV: RRR, no murmurs  Pulm: No increased work of breathing on room air  Breath sounds b/l with crackles noted in the lower right lobe.  GI: S, ND, abdomen not  TTP,  MSK: able to move extremities  Skin: No LE edema, warm and dry  Assessment/Plan  ?   Mr Pratt is a 46-year-old male who was diagnosed with?Stage 3B SSC lung cancer (9/05) here for evaluation of Mediport placement. Oncology is planning for at least 12 rounds of chemotherapy every 3 weeks.  ?  - scheduled for mediport placement on 10/11/21  - Risks and benefits of surgery discussed with the pt and his wife and they acknowledged  - patient instructed to not begin use of Xarelto until after surgery  ?  ?  Tanner De Guzman MD  [facility] Surgery, PGY-1  Above Hx and assessment reviewed and discussed with Resident.  Agree with plan of care.  ?  Dante Grider MD  ?  ? Problem List/Past Medical History  Ongoing  ??ADD (attention deficit disorder)  ?Cachexia  ?Iron deficiency anemia  ?Lung mass  ?Malignant neoplasm of unspecified part of right bronchus or lung  ?SIADH (syndrome of inappropriate ADH production)  ?Tobacco user  Historical  ??No qualifying data  Procedure/Surgical HistoryTransfusion of Autologous Red Blood Cells into Peripheral Vein, Percutaneous Approach (09/09/2021)  Bronchoscopy w/ Endobronch Biopsy(s) (08/18/2021)  Bronchoscopy, rigid or flexible, including fluoroscopic guidance, when performed; with bronchial or endobronchial biopsy(s), single or multiple sites (08/18/2021)  Extraction of Shayna, Via Natural or Artificial Opening Endoscopic, Diagnostic (08/18/2021)  Tonsillectomy (1981)   ?  Medications  ?acetaminophen-oxycodone 325 mg-7.5 mg oral tablet, 1 tab(s), Oral, TID, PRN  ?albuterol 90 mcg/inh inhalation aerosol, 1 puff(s), INH, Once, PRN  ?famotidine 20 mg oral tablet, 20 mg= 1 tab(s), Oral, BID  ?ferrous gluconate 324 mg (38 mg elemental iron) oral tablet, 324 mg= 1 tab(s), Oral, BID, 1 refills,? ?Not taking  ?megestrol 40 mg/mL oral suspension, 800 mg= 20 mL, Oral, Daily  ?Xarelto 10mg Tablet, 10 mg= 1 tab(s), Oral, Daily, 2 refills,? ?Not taking  Allergies  penicillin?(unknown)  Social  History  Abuse/Neglect  ?No, 10/05/2021  ?No, No, Yes, 09/30/2021  ?No, 08/07/2021  Alcohol  ?Current, Beer, Daily, Alcohol use interferes with work or home: No. Drinks more than intended: No. Others hurt by drinking: No. Ready to change: No., 07/31/2017  Employment/School  ?Unemployed, 08/16/2021  Exercise  ?Exercise duration: 0., 08/16/2021  Financial/Legal Situation  ?None, 08/16/2021  Home/Environment  ?Lives with Spouse., 08/16/2021    ?Never in , 08/16/2021  Nutrition/Health  ?Regular, Fair, 08/16/2021  Spiritual/Cultural  ?Alevism, 08/16/2021  Substance Use  ?Never, 07/31/2017  Tobacco  ?Former smoker, quit more than 30 days ago, No, 10/05/2021  Immunizations  ?Vaccine ?Date ?Status   ?tetanus/diphtheria/pertussis, acel(Tdap) 07/31/2017 Given   ?  Health Maintenance  Health Maintenance  ???Pending?(in the next year)  ??? ??Due?  ??? ? ? ?Lipid Screening due??10/05/21??Unknown Frequency  ??? ??Due In Future?  ??? ? ? ?Obesity Screening not due until??01/01/22??and every 1??year(s)  ??? ? ? ?Smoking Cessation not due until??01/01/22??and every 1??year(s)  ??? ? ? ?Alcohol Misuse Screening not due until??01/02/22??and every 1??year(s)  ??? ? ? ?ADL Screening not due until??08/16/22??and every 1??year(s)  ??? ? ? ?Hypertension Management-BMP not due until??09/30/22??and every 1??year(s)  ???Satisfied?(in the past 1 year)  ??? ??Satisfied?  ??? ? ? ?ADL Screening on??08/16/21.??Satisfied by Marcia Nobles  ??? ? ? ?Alcohol Misuse Screening on??10/05/21.  ??? ? ? ?Aspirin Therapy for CVD Prevention on??10/05/21.??Satisfied by Moira Rojas  ??? ? ? ?Blood Pressure Screening on??10/05/21.??Satisfied by Juliana Chavez LPN  ??? ? ? ?Body Mass Index Check on??10/05/21.??Satisfied by Juliana Chavez LPN  ??? ? ? ?Depression Screening on??10/05/21.??Satisfied by Juliana Chavez LPN  ??? ? ? ?Diabetes Screening on??09/30/21.??Satisfied by Dafne Borges  ??? ? ? ?Hypertension  Management-Blood Pressure on??10/05/21.??Satisfied by Juliana Chavez LPN  ??? ? ? ?Influenza Vaccine on??10/05/21.??Satisfied by Juliana Chavez LPN  ??? ? ? ?Obesity Screening on??10/05/21.??Satisfied by Juliana Chavez LPN  ??? ? ? ?Smoking Cessation on??09/08/21.??Satisfied by VIRAJ Bolton, Tracie

## 2022-05-05 NOTE — HISTORICAL [facility] CERNER
This is a historical note converted from [employer]. Formatting and pictures may have been removed.  Please reference [employer] for original formatting and attached multimedia. Chief Complaint  Taxotere, Cisplatin  History of Present Illness  Problem List:  -Squamous cell carcinoma of lung, stage III  -Microcytic anemia (relative iron deficiency)  -Hypercalcemia of malignancy  -Thrombocytosis  -Hyponatremia (SIADH)  ?   Current Treatment:  Cisplatin 75 mg/m? day 1+ docetaxel 75 mg/m? day 1; every 21 days x 4 cycles  Start 10/18/201  ?   Treatment History:  ?   History of Present Illness:  Past medical history: Attention deficit disorder. Tobacco abuse. Tonsillectomy.  Social history:?. ?Lives in [place], [place]. ?Has 2 children.??Has worked as a  all his life?(more than 30-35 years).??Smoked 1-1/2 packs of cigarettes daily for 15 years;?then, 1 pack daily for 1 year;?now, for last 2 months, 2 cigarettes daily.? Heavy alcohol use in the past;?8-10 beers daily?for 10 years;?quit 1-1/2 years ago.? No illicit drugs.  Family history:?No family members with cancer.  Health maintenance:?So far, does not have a primary care physician.? Apparently, he is going to see a primary care physician for the first time next week.  ?   Interval History 11/10/2021: Patient?presents today?along with his wife for scheduled follow up visit for Lung cancer. He is currently receiving Cisplatin + Taxotere and due for cycle 2 today. He reports difficulty eating due to epigastric pain post eating or when lying down at night. he reports that cream called out at last visit has helped radiation burn to back. He denies any N/V/D/C, blood in urine/stool, changes in bowel/bladder habits. He was started on Carafate at last visit for epigastric pain but patient reports that it is ineffective. He reports that he was compliant with taking QID 30minutes before each meal. Lab work reviewed with patient, stable for treatment. We  will order a CT A/P today to be completed ASAP to rule out gastric ulcer vs metastatic disease. Patient and his wife amenable. Denies any further questions/needs.  Review of Systems  A complete 12-point ROS was performed in full with pertinent positives as described in interval history. Remainder of ROS done in full and are negative.  ?  Physical Exam  Vitals & Measurements  T:?36.8? ?C (Oral)? HR:?113(Peripheral)? RR:?18? BP:?124/87? SpO2:?98%?  HT:?164.80?cm? WT:?48.710?kg? BMI:?17.94?  Physical Exam:  General: Alert and oriented, No acute distress.?  Appearance: Well-groomed  HEENT: Normocephalic, Oral mucosa is moist. Pupils are equal, round and reactive to light, Extraocular movements are intact, Normal conjunctiva.?  Neck: Supple, Non-tender, No lymphadenopathy, No thyromegaly.?  Respiratory: Lungs are clear to auscultation, Respirations are non-labored, Breath sounds are equal, Symmetrical chest wall expansion.?  Cardiovascular: Normal rate, Regular rhythm, No edema.?  Breast: Breast exam not performed on todays visit.?  Gastrointestinal: Rounded, Soft, Non-tender, Non-distended, Normal bowel sounds.?  Musculoskeletal: Normal strength. Ambulates without assistance  Integumentary: Warm, dry, intact.?  Neurologic: Alert, Oriented, No focal deficits, Cranial Nerves II-XII are grossly intact.?  Cognition and Speech: Oriented, Speech clear and coherent.?  Psychiatric: Cooperative, Appropriate mood & affect.?  ECOG Performance Scale: 2 - Capable of all self-care but unable to carry out any work activities. Up and about greater than 50 percent of waking hours.?  ?  Assessment/Plan  1.?Lung mass?R91.8  1.?Malignant neoplasm of unspecified part of right bronchus or lung?C34.81  ?Locally advanced, obviously inoperable squamous cell carcinoma of lung;  large, 10 cm mediastinal mass, possibly arising in the right lower lung lobe, causing compressive effects on?right mainstem bronchus and right pulmonary artery;  no  metastasis;  s/p bronchoscopic biopsy of carinal?mass 08/18/2021;  large fungating mass at maximilaino, obliterating the right mainstem bronchus and partially occluding left mainstem;  causing hyponatremia, hypercalcemia;  worsening dyspnea, fever, postobstructive pneumonia  ?  #?Squamous cell carcinoma of lung:  -Bronchoscopy (08/18/2021). ?CT chest with contrast (08/06/2021). ?CT A/P without contrast (08/06/2021. ?Brain MRI (09/10/2021). ?Bone scan (09/11/2021).  -10.0 x 6.8 x 7.0 cm subcarinal mass, compressive effect upon right mainstem bronchus and right lower lobe pulmonary artery branches, complete obliteration of right lower lobe bronchi, partial narrowing of the main pulmonary artery  -Right lower lung lobe large masslike consolidation with central cavitations, without clear interface between the right lower lung lobe mass consolidation and the mediastinal mass  -On bronchoscopy, large fungating mass at maximiliano, obliterating the right mainstem bronchus and partially occluding left mainstem  -No metastasis on CTs, bone scan, brain MRI  -Causing postobstructive pneumonia and hospitalization  -S/p laser tumor debulking at [facility] 08/27/2021  -Causing hyponatremia secondary to SIADH and hypercalcemia  -Semiurgently, started on radiation therapy, pending concurrent chemotherapy,?given?complication of right mainstem bronchus, complete obliteration of right lower lobe bronchi, causing postobstructive pneumonia, as well as to prevent?precipitous?respiratory insufficiency?(subsequently, with good?subjective and objective response)  >>  -Tumor >7 cm; tumor invading mediastinum; therefore, cT4  -Mediastinal mass, malignant gosia complex versus extension of right lower lobe mass, therefore, cN2 or cN3  -M0  >>> ?Stage IIIB or stage IIIC  -Completed 2 weeks of hypofractionated?palliative radiation therapy 09/30/2021;?exertional dyspnea improved)  (Excellent subjective and objective response to radiotherapy)  (Did not receive  concurrent chemotherapy?with RT)  -25 pound weight loss over 1 year, including 15 pound weight loss?in last 3 months; anorexic  -09/20/2021:?ECOG 2; exertional dyspnea has improved with palliative radiation therapy?(completed?2 weeks of hypofractionated?radiotherapy?09/30/2021)  -09/30/2021: PET/CT (comparison: CT C/A/P 08/06/2021): Hypermetabolic subcarinal mass with FDG avid soft tissue extending to the right hilar region (5 cm subcarinal mass, extends along the right mainstem bronchus and right upper lobe bronchus with the right perihilar region); possible 8 mm FDG avid lymph node anterior to the right mainstem bronchus; no distant sites of FDG avid metastatic disease; multifocal right mid and lower lung consolidation with no hypermetabolic component appreciated; small to moderate right pleural effusion  ?(PET/CT:?Subcarinal mass 5 cm, was >7 cm to start with, thus, shrunk with radiation treatment; 8 mm?mediastinal lymph node; no distant metastasis)  ?  ?   #?Molecular markers:  -EGFR mutation negative; PD-L1 negative (TPS 0%); KRAS mutation negative; ALK gene rearrangement negative; ROS1 gene rearrangement negative; NTRK NGS fusion panel negative  ??  ?  #?Microcytic anemia (relative iron deficiency +/- anemia of chronic disease/malignancy):  -Hemoglobin 6.8-9.3  -MCV 79.7  -MCH, MCHC low  -Stool for occult blood negative x2 (09/2021)  -B12 normal, 7-55 (09/06/2021)  -Serum iron 9, TIBC 157, transferrin saturation 6%, ferritin 795.5 (09/06/2021)  -Folate low, 6.5 (09/06/2021  -09/30/2021: Serum iron 24, TIBC 220, transferrin saturation 11%, ferritin 714.65 (anemia of chronic disease/relative iron deficiency); B12 level 1046; RBC folate 1147; hemoglobin 10.5  ??  ?  #?Hypercalcemia of malignancy:  Calcium level:  -08/06/2021: 10.3 (albumin 2.5)  -9.6-10.2 between 08/07/2021-09/08/2021  -09/09/2021: 10.5 (albumin 1.8)  -09/12/2021: 10.6 (albumin 2.2)  -09/21/2021: 8.2 (albumin 2.1)  -08/07/2021: Intact PTH level  normal, 12.7; intact PTH level <2.0  -09/10/2021: PTH needed peptide <2.0  -Zometa 4 mg IV x1 (09/12/2021)?>> hypercalcemia resolved?  -09/30/2021:?Ionized calcium level 5.1, normal (Serum calcium 9.3, albumin 2.6, corrected calcium 10.42)  ??  ?  #?Thrombocytosis:  -Platelet count 413-588K  -Almost certainly, secondary to underlying malignancy and relative iron deficiency  -09/30/2021: ELDON-2 mutation negative  ?  ?  #?Hyponatremia secondary to SIADH:  -Sodium 128-130  -Treated with fluid restriction, salt tablets (by nephrology)-on hold per patient report  ?  ?   ?Plan:  -No distant metastases on PET/CT; subcarinal mass 5 cm, has shrunk with radiation treatment  ?   -Whether stage IIIB or stage IIIC, treatment remains the same: Definitive concurrent chemoradiation therapy, followed by consolidation immunotherapy with durvalumab  -Completed 14 days of?hypofractionated?palliative radiotherapy 09/30/2021;?did not receive concurrent chemotherapy  -Patient has already received radiation therapy for stage III squamous cell carcinoma of lung, without concurrent chemotherapy  >>>  -Now, will administer (sequential) adjuvant chemotherapy  -Cisplatin 75 mg/m? day 1+ docetaxel 75 mg/m? day 1; every 21 days x 4 cycles  -No distant metastases on PET/CT, therefore,?will avoid?palliative?chemotherapy regimen at this time  ?   -After completion of adjuvant?(sequential) chemotherapy,?will initiate surveillance appropriate for?stage III non-small cell lung cancer  -Repeat contrast-enhanced CT scans of C/A/P?in 3 months (end of December/early January 22)  ?   -Check for?BRAF mutation; RET rearrangement  ?  -Microcytic anemia  -Anemia of chronic disease/relative iron deficiency  >>>  -Proceed with a trial of parenteral iron therapy (Feraheme 510 mg IV x2; assess response with repeat CBC and iron studies 4-6 weeks later)  ?  Hypercalcemia of malignancy  -S/p Zometa 4 mg IV x1 (09/12/2021)  >>>  Follow calcium  level  ?  Thrombocytosis  -Almost certainly, reactive (iron deficiency anemia, malignancy)  -JAK2 mutation negative  >>>  Check?BCR-ABL 1 fusion transcript testing  ?  Hyponatremia secondary to SIADH secondary to malignancy  >>>  -Managed by renal; Salt tablets on hold  -Continue fluid restriction, will monitor sodium level  ?  -09/30/2021:?Malnutrition; anorexia;?35 pound weight loss in 1 year including 15 pound weight loss?in?last 3 months  -We will start Megace 800 mg p.o. daily to boost appetite  -Advised him to follow?nutritionists?advice ([employer])?in terms of Ensure/boost intake?to prevent any more weight loss  ?  ?  -Follow up w/NP in?3 weeks (12/1/2021)?with CBC,CMP,MG,PHOS prior to cycle 3 Cisplatin/Taxotere  ?  -Order CT A/P w/contrast ASAP rule out gastric ulcer vs Metastatic process.  ?  -CT C/A/P scheduled for 12/27/2021  ?  -Continue to follow up with Renal for SIADH  ?  Above discussed at length with him and his wife. ?All questions answered.  Discussed labs and scans and gave him copies of relevant reports.  Clearly and candidly discussed the following:?That he has advanced stage lung cancer;?that it appears to be essentially comparable; that treatment is purely palliative; that response with treatment cannot be guaranteed.  During chemotherapy, check CBC and CMP every 10 days or so?to monitor for toxicity.  ?  He and his wife understand and agree with this plan, and were appreciative.  Ordered:  ?   Problem List/Past Medical History  Ongoing  ADD (attention deficit disorder)  Cachexia  Iron deficiency anemia  Lung mass  Malignant neoplasm of unspecified part of right bronchus or lung  SIADH (syndrome of inappropriate ADH production)  Tobacco user  Historical  No qualifying data  Procedure/Surgical History  Catheter Insertion Mediport (None) (10/11/2021)  Fluoroscopy of Superior Vena Cava using Low Osmolar Contrast, Guidance (10/11/2021)  Insertion of Infusion Device into Superior Vena Cava,  Percutaneous Approach (10/11/2021)  Insertion of tunneled centrally inserted central venous access device, with subcutaneous port; age 5 years or older (10/11/2021)  Transfusion of Autologous Red Blood Cells into Peripheral Vein, Percutaneous Approach (09/09/2021)  Bronchoscopy w/ Endobronch Biopsy(s) (08/18/2021)  Bronchoscopy, rigid or flexible, including fluoroscopic guidance, when performed; with bronchial or endobronchial biopsy(s), single or multiple sites (08/18/2021)  Extraction of Shayna, Via Natural or Artificial Opening Endoscopic, Diagnostic (08/18/2021)  Tonsillectomy (1981)   Medications  acetaminophen-oxycodone 325 mg-7.5 mg oral tablet, 1 tab(s), Oral, TID, PRN  albuterol 90 mcg/inh inhalation aerosol, 1 puff(s), INH, Once, PRN  Carafate 1 g oral tablet, 1 gm= 1 tab(s), Oral, QIDACHS, 2 refills  Decadron 4 mg oral tablet, 8 mg= 2 tab(s), Oral, BID, 3 refills  famotidine 20 mg oral tablet, 20 mg= 1 tab(s), Oral, BID,? ?Not taking  gabapentin 300 mg oral capsule, 300 mg= 1 cap(s), Oral, TID  Heparin Flush 100 U/mL - 5 mL, 500 units= 5 mL, IV Push, Once-chemo  megestrol 40 mg/mL oral suspension, 800 mg= 20 mL, Oral, Daily  Allergies  penicillin?(unknown)  Social History  Abuse/Neglect  No, No, No, 11/10/2021  Alcohol  Current, 10/25/2021  Employment/School  Unemployed, 08/16/2021  Exercise  Exercise duration: 0., 08/16/2021  Financial/Legal Situation  None, 08/16/2021  Home/Environment  Lives with Spouse., 08/16/2021    Never in , 08/16/2021  Nutrition/Health  Regular, Fair, 08/16/2021  Spiritual/Cultural  Jainism, 08/16/2021  Substance Use  Never, 10/25/2021  Tobacco  Former smoker, quit more than 30 days ago, N/A, 11/10/2021  Immunizations  Vaccine Date Status   tetanus/diphtheria/pertussis, acel(Tdap) 07/31/2017 Given   Health Maintenance  Health Maintenance  ???Pending?(in the next year)  ??? ??Due?  ??? ? ? ?Lipid Screening due??11/10/21??Unknown Frequency  ??? ??Due In  Future?  ??? ? ? ?Obesity Screening not due until??01/01/22??and every 1??year(s)  ??? ? ? ?Smoking Cessation not due until??01/01/22??and every 1??year(s)  ??? ? ? ?Alcohol Misuse Screening not due until??01/02/22??and every 1??year(s)  ??? ? ? ?ADL Screening not due until??08/16/22??and every 1??year(s)  ??? ? ? ?Aspirin Therapy for CVD Prevention not due until??10/05/22??and every 1??year(s)  ???Satisfied?(in the past 1 year)  ??? ??Satisfied?  ??? ? ? ?ADL Screening on??08/16/21.??Satisfied by Marcia Nobles  ??? ? ? ?Alcohol Misuse Screening on??10/05/21.??Satisfied by Moira Rojas  ??? ? ? ?Aspirin Therapy for CVD Prevention on??10/05/21.??Satisfied by Moira Rojas  ??? ? ? ?Blood Pressure Screening on??11/10/21.??Satisfied by Lynn Fu  ??? ? ? ?Body Mass Index Check on??11/10/21.??Satisfied by Lynn Fu  ??? ? ? ?Depression Screening on??11/10/21.??Satisfied by Kathy Lynn  ??? ? ? ?Diabetes Screening on??11/10/21.??Satisfied by Yaw Johnson  ??? ? ? ?Hypertension Management-Blood Pressure on??11/10/21.??Satisfied by Lynn Fu  ??? ? ? ?Influenza Vaccine on??11/10/21.??Satisfied by Lynn Fu  ??? ? ? ?Obesity Screening on??11/10/21.??Satisfied by Lynn Fu  ??? ? ? ?Smoking Cessation on??09/08/21.??Satisfied by VIRAJ Bolton, Tracie  ?  Lab Results  Test Name Test Result Date/Time   Sodium Lvl 139 mmol/L 11/10/2021 08:11 CST   Potassium Lvl 4.2 mmol/L 11/10/2021 08:11 CST   Chloride 106 mmol/L 11/10/2021 08:11 CST   CO2 27 mmol/L 11/10/2021 08:11 CST   Calcium Lvl 10.0 mg/dL 11/10/2021 08:11 CST   Magnesium Lvl 2.00 mg/dL 11/10/2021 08:11 CST   Glucose Lvl 107 mg/dL (High) 11/10/2021 08:11 CST   BUN 9.2 mg/dL 11/10/2021 08:11 CST   Creatinine 0.64 mg/dL (Low) 11/10/2021 08:11 CST   Est Creat Clearance Ser 123.57 mL/min 11/10/2021 09:13 CST   eGFR-AA >105 11/10/2021 08:11 CST   eGFR-BANDAR >105 mL/min/1.73 m2 11/10/2021 08:11 CST   Bili Total 0.4 mg/dL 11/10/2021 08:11  CST   Bili Direct 0.2 mg/dL 11/10/2021 08:11 CST   Bili Indirect 0.20 mg/dL 11/10/2021 08:11 CST   AST 10 unit/L 11/10/2021 08:11 CST   ALT 8 unit/L 11/10/2021 08:11 CST   Alk Phos 56 unit/L 11/10/2021 08:11 CST   Total Protein 7.5 gm/dL 11/10/2021 08:11 CST   Albumin Lvl 3.3 gm/dL (Low) 11/10/2021 08:11 CST   Globulin 4.2 gm/dL (High) 11/10/2021 08:11 CST   A/G Ratio 0.8 ratio (Low) 11/10/2021 08:11 CST   Phosphorus 3.1 mg/dL 11/10/2021 08:11 CST   WBC 6.9 x10(3)/mcL 11/10/2021 08:11 CST   RBC 4.58 x10(6)/mcL 11/10/2021 08:11 CST   Hgb 12.5 gm/dL (Low) 11/10/2021 08:11 CST   Hct 38.9 % (Low) 11/10/2021 08:11 CST   Platelet 288 x10(3)/mcL 11/10/2021 08:11 CST   MCV 84.9 fL 11/10/2021 08:11 CST   MCH 27.3 pg 11/10/2021 08:11 CST   MCHC 32.1 gm/dL 11/10/2021 08:11 CST   RDW 22.4 % (High) 11/10/2021 08:11 CST   MPV 8.5 fL 11/10/2021 08:11 CST   Abs Neut 5.35 x10(3)/mcL 11/10/2021 08:11 CST   Neutro Auto 77 % 11/10/2021 08:11 CST   Lymph Auto 11 % 11/10/2021 08:11 CST   Mono Auto 10 % 11/10/2021 08:11 CST   Eos Auto 1 % 11/10/2021 08:11 CST   Abs Eos 0.0 x10(3)/mcL 11/10/2021 08:11 CST   Basophil Auto 1 % 11/10/2021 08:11 CST   Abs Neutro 5.35 x10(3)/mcL 11/10/2021 08:11 CST   Abs Lymph 0.8 x10(3)/mcL 11/10/2021 08:11 CST   Abs Mono 0.7 x10(3)/mcL 11/10/2021 08:11 CST   Abs Baso 0.1 x10(3)/mcL 11/10/2021 08:11 CST   NRBC% 0.0 % 11/10/2021 08:11 CST   IG% 1 % 11/10/2021 08:11 CST   IG# 0.050 11/10/2021 08:11 CST

## 2022-05-05 NOTE — HISTORICAL [facility] CERNER
This is a historical note converted from Sami. Formatting and pictures may have been removed.  Please reference [place] for original formatting and attached multimedia. Chief Complaint  Pt c/o cough ?x 1 year He c/o Shortness of breathe for many months. Pt went to [facility] dx with Pneumonia, Left pleural effusion and possible malignancy. Pt talking freely in triage, skin w/d.  History of Present Illness  45yo  male presented to [facility] ED due to consistent shortness of breath for about one year. He reports that his shortness of breath has not worsened over time but is bothersome because it has not gone away. He reports mild exacerbation when he exerts himself and no identifiable relieving factors. This shortness of breath has been associated with non-productive cough, fatigue, anorexia, night sweats, occasional fever,?and 20lb unintentional weight loss. The patient denies having any chronic medical problems aside from ADD for which he took prescribed Adderall until about 2 months ago. He denies hemoptysis,?orthopnea, chest pain, edema, sore throat, voice changes, diaphoresis, irritability, or insomnia.?The patient?has no?recent travel or sick contacts and has never been incarcerated. He lives at home with his wife.  ?   Medical Hx:ADD  Family Hx:ALS - Mother  Surgical Hx:Tonsillectomy  Social Hx:60 pack year smoking hx; 1ppd currently. Occasional EtOH; 1-2 drinks/week. Denies illicit drug use  Occupational Hx:[employer]  Allergies: Penicillin?  ?  Review of Systems  Constitutional:?no fever, weakness. +fatigue, +occasional night sweats, +20lb weight loss, +anorexia  Eye:?no vision loss, eye redness, drainage, or pain  ENMT:?no sore throat, ear pain, sinus pain/congestion, nasal congestion/drainage  Respiratory: +non-productive cough, +shortness of breath  Cardiovascular:?no chest pain, no palpitations, no edema; patient reports having been told he has a murmur  Gastrointestinal:?no nausea, vomiting, or  diarrhea. No abdominal pain  Genitourinary:?no dysuria, no urinary frequency or urgency, no hematuria  Hema/Lymph:?no abnormal bruising or bleeding  Endocrine:?no heat or cold intolerance, no excessive thirst or excessive urination  Musculoskeletal:?no muscle or joint pain, no joint swelling  Integumentary:?no skin rash or abnormal lesion  Neurologic: no headache, no dizziness, no weakness or numbness  Physical Exam  Vitals & Measurements  T:?37.1? ?C (Temporal Artery)? HR:?80(Peripheral)? HR:?80(Monitored)? RR:?17? BP:?124/84? SpO2:?98%? WT:?48.9?kg? BMI:?20.09?  General:?cachectic male in?no acute distress  Eye: PERRLA, EOMI, clear conjunctiva, eyelids normal  HENT:?TMs/ear canals clear, oropharynx without erythema/exudate, oropharynx and nasal mucosal surfaces moist  Neck: full range of motion, no thyromegaly or lymphadenopathy  Respiratory:?left lung clear to auscultation, +crackles in the R upper, middle, and lower lung fields  Cardiovascular: regular rate and rhythm without murmurs  Gastrointestinal:?soft, non-tender, non-distended with normal bowel sounds, without masses to palpation  Genitourinary: no CVA tenderness to palpation  Musculoskeletal:?full range of motion of all extremities/spine without limitation or discomfort  Integumentary: no rashes or skin lesions present, +moderate digital clubbing  Neurologic: cranial nerves intact, no signs of peripheral neurological deficit, motor/sensory function intact  ?  ?  (08/06/2021 16:37 CDT CT Thorax W Contrast)  IMPRESSION:?  ?  1. 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  2. ?Right lower lung lobe large masslike consolidation with central  cavitations.  Assessment/Plan  Shortness of Breath  -Patient with chronic shortness of breath in the setting of extensive smoking history and R  lung mass likely to have obstructive pneumonia  -Currently sating at 98%; will continue to monitor  ?   Cough  -Patient with chronic shortness of breath in the setting of extensive smoking history and R lung mass likely to have obstructive pneumonia  -Currently sating at 98%; will continue to monitor  ?  Right Lung Mass  -Obstructive mass in the setting of chronic shortness of breath, cough, extensive smoking history?and imaging findings suggest possible lung malignancy  -Patient being admitted to assure proper evaluation for the mass  ?   Post-obstructive Pneumonia  -Will obtain respiratory cultures to isolate pathogens  -Until results it is appropriate to treat the patient with Cephalosporin/Carbapenem + Macrolide; in the setting of penicillin allergy Erta + Azithro may be most appropriate choice  -Will continue to monitor white count and symptoms  ?   Hypercalcemia  -Repeat Ca2+ levels to confirm hypercalcemia  -If repeat levels remain high, appropriate to order PTH to narrow differential  ?   Hyponatremia  -Patient vitals and clinical presentation suggest euvolemic state  -Serum Osm and urine Osm should be determined  -Fluid restricting the patient prior to remeasuring would help narrow differential  ?  Neutrophilic Leukocytosis  -Will start the patient on appropriate antibiotics to treat pneumonia  -Will continue to trend WBC count  ?  ?  Resident Addendum: Saw and examined patient with student, agree with plan above. This is a 45 yo M with PMH of childhood cardiac murmur, tobacco use 50 pack years, ADHD who presents with one year of SOB that worsened over past 2 months now with ALFONSO, 20 pound unintentional weight loss, occasional fever/sweats, productive white sputum. 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 CT thorax reveals a right mediastinal  subcarinal mass compressing pulmonary artery causing suspected obstructive right lower lobe pneumonia.?WBC 18.4, corrected Calcium 11.5, Na 130. Will start Levaquin for treatment of pneumonia given penicillin allergy and order CT abdomen w/o contrast to evaluate for metastasis. Admitted for treatment of CAP and work-up of suspected lung cancer causing SIADH and hypercalcemia. Will need referral to Heme-onc, pulmonary for possible EBUS. On exam, patient appears comfortable, AOx3, S1/S2 RRR, lung sounds R < L with course breath sounds b/l, abdomen soft/NT/ND, no peripheral edema.  ?  Mauri Lopez MD  Faculty addendum:? Patient seen and examined this am by me personally.? Reviewed history, physical findings, laboratory data, and CT chest. Significant clubbing on exam,? weight loss, lung mass? concerning for malignancy.? Patient satting well on room air. Afebrile.? Okay to discharge with arrangements for followup in lung mass clinic.? Continue levaquin outpatient for concern over post obstructive pneumonia.? Will also need PET scan and PFTs   Problem List/Past Medical History  Ongoing  ADD (attention deficit disorder)  Tobacco user  Historical  No qualifying data  Procedure/Surgical History  Tonsillectomy (1981)   Medications  Inpatient  Lovenox, 40 mg= 0.4 mL, Subcutaneous, Once  Normal Saline (0.9% NS) IV 1,000 mL, 1000 mL, IV  Home  AMPHETAMINE SALTS 30 MG TAB, Oral  erythromycin 0.5% ophthalmic ointment, 1 loy, Eye-Left, QID  ibuprofen 600 mg oral tablet, 600 mg= 1 tab(s), Oral, QID, PRN  Allergies  penicillin?(unknown)  Social History  Abuse/Neglect  No, No, Yes, 08/06/2021  Alcohol  Current, Beer, Daily, Alcohol use interferes with work or home: No. Drinks more than intended: No. Others hurt by drinking: No. Ready to change: No., 07/31/2017  Substance Use  Never, 07/31/2017  Tobacco  10 or more cigarettes (1/2 pack or more)/day in last 30 days, Cigarettes, No, 15 per day., 08/06/2021  Immunizations  Vaccine Date  Status   tetanus/diphtheria/pertussis, acel(Tdap) 07/31/2017 Given

## 2022-05-13 DIAGNOSIS — C34.90 SQUAMOUS CELL CARCINOMA OF LUNG, UNSPECIFIED LATERALITY: Primary | ICD-10-CM

## 2022-05-15 PROBLEM — E83.52 HYPERCALCEMIA: Status: ACTIVE | Noted: 2022-05-15

## 2022-05-15 PROBLEM — D63.8 ANEMIA OF CHRONIC DISEASE: Status: ACTIVE | Noted: 2022-05-15

## 2022-05-15 PROBLEM — E22.2 SIADH (SYNDROME OF INAPPROPRIATE ADH PRODUCTION): Status: ACTIVE | Noted: 2022-05-15

## 2022-05-15 PROBLEM — D50.9 IRON DEFICIENCY ANEMIA: Status: ACTIVE | Noted: 2022-05-15

## 2022-05-15 RX ORDER — DIPHENHYDRAMINE HYDROCHLORIDE 50 MG/ML
50 INJECTION INTRAMUSCULAR; INTRAVENOUS ONCE AS NEEDED
Status: CANCELLED | OUTPATIENT
Start: 2022-05-17

## 2022-05-15 RX ORDER — SODIUM CHLORIDE 0.9 % (FLUSH) 0.9 %
10 SYRINGE (ML) INJECTION
Status: CANCELLED | OUTPATIENT
Start: 2022-05-17

## 2022-05-15 RX ORDER — EPINEPHRINE 0.3 MG/.3ML
0.3 INJECTION SUBCUTANEOUS ONCE AS NEEDED
Status: CANCELLED | OUTPATIENT
Start: 2022-05-15

## 2022-05-15 RX ORDER — EPINEPHRINE 0.3 MG/.3ML
0.3 INJECTION SUBCUTANEOUS ONCE AS NEEDED
Status: CANCELLED | OUTPATIENT
Start: 2022-05-17

## 2022-05-15 RX ORDER — HEPARIN 100 UNIT/ML
500 SYRINGE INTRAVENOUS
Status: CANCELLED | OUTPATIENT
Start: 2022-05-15

## 2022-05-15 RX ORDER — HEPARIN 100 UNIT/ML
500 SYRINGE INTRAVENOUS
Status: CANCELLED | OUTPATIENT
Start: 2022-05-17

## 2022-05-15 RX ORDER — SODIUM CHLORIDE 0.9 % (FLUSH) 0.9 %
10 SYRINGE (ML) INJECTION
Status: CANCELLED | OUTPATIENT
Start: 2022-05-15

## 2022-05-15 RX ORDER — DIPHENHYDRAMINE HYDROCHLORIDE 50 MG/ML
50 INJECTION INTRAMUSCULAR; INTRAVENOUS ONCE AS NEEDED
Status: CANCELLED | OUTPATIENT
Start: 2022-05-15

## 2022-05-15 NOTE — PROGRESS NOTES
Past medical history: Attention deficit disorder. Tobacco abuse. Tonsillectomy.  Social history: .  Lives in [place], [place].  Has 2 children.  Has worked as a  all his life (more than 30-35 years).  Smoked 1-1/2 packs of cigarettes daily for 15 years; then, 1 pack daily for 1 year; now, for last 2 months, 2 cigarettes daily.  Heavy alcohol use in the past; 8-10 beers daily for 10 years; quit 1-1/2 years ago.  No illicit drugs.  Family history: No family members with cancer.  Health maintenance: So far, does not have a primary care physician.  Apparently, he is going to see a primary care physician for the first time next week.      Reason for follow-up:   -Squamous cell carcinoma of lung, stage III   -Microcytic anemia (relative iron deficiency)   -Hypercalcemia of malignancy   -Thrombocytosis   -Hyponatremia (SIADH)         History of present illness:   46-year-old , smoker, presented to urgent care 08/06/2021 with complaint of shortness of breath, weakness, and 20 pound unintentional weight loss over last 1 year. Productive cough. White sputum. Night sweats and fever. Was referred to ED at [facility] for further work-up. Noted to have hypercalcemia, hyponatremia, and neutrophilic leukocytosis. Work-up showed large heterogeneous mediastinal mass and right lower lobe mass.       #Squamous cell carcinoma of lung:   -Bronchoscopy (08/18/2021).  CT chest with contrast (08/06/2021).  CT A/P without contrast (08/06/2021.  Brain MRI (09/10/2021).  Bone scan (09/11/2021).   -10.0 x 6.8 x 7.0 cm subcarinal mass, compressive effect upon right mainstem bronchus and right lower lobe pulmonary artery branches, complete obliteration of right lower lobe bronchi, partial narrowing of the main pulmonary artery   -Right lower lung lobe large masslike consolidation with central cavitations, without clear interface between the right lower lung lobe mass consolidation and the mediastinal  mass   -On bronchoscopy, large fungating mass at maximiliano, obliterating the right mainstem bronchus and partially occluding left mainstem   -No metastasis on CTs, bone scan, brain MRI   -Causing postobstructive pneumonia and hospitalization   -S/p laser tumor debulking at [facility] 08/27/2021   -Causing hyponatremia secondary to SIADH and hypercalcemia   -Semiurgently, started on radiation therapy, pending concurrent chemotherapy, given complication of right mainstem bronchus, complete obliteration of right lower lobe bronchi, causing postobstructive pneumonia, as well as to prevent precipitous respiratory insufficiency (subsequently, with good subjective and objective response)   >>   -Tumor >7 cm; tumor invading mediastinum; therefore, cT4   -Mediastinal mass, malignant gosia complex versus extension of right lower lobe mass, therefore, cN2 or cN3   -M0   >>> Stage IIIB or stage IIIC   -Completed 2 weeks of hypofractionated palliative radiation therapy (09/13/2021-09/30/2021); exertional dyspnea improved)   (Excellent subjective and objective response to radiotherapy)   (Did not receive concurrent chemotherapy with RT)   -25 pound weight loss over 1 year, including 15 pound weight loss in last 3 months; anorexic   -09/20/2021: ECOG 2; exertional dyspnea has improved with palliative radiation therapy (completed 2 weeks of hypofractionated radiotherapy 09/30/2021)   -09/30/2021: PET/CT (comparison: CT C/A/P 08/06/2021): Hypermetabolic subcarinal mass with FDG avid soft tissue extending to the right hilar region (5 cm subcarinal mass, extends along the right mainstem bronchus and right upper lobe bronchus with the right perihilar region); possible 8 mm FDG avid lymph node anterior to the right mainstem bronchus; no distant sites of FDG avid metastatic disease; multifocal right mid and lower lung consolidation with no hypermetabolic component appreciated; small to moderate right pleural effusion  (PET/CT: Subcarinal mass 5  cm, was >7 cm to start with, thus, shrunk with radiation treatment; 8 mm mediastinal lymph node; no distant metastasis)   -BRAF mutation negative   -RET gene rearrangement not detected   -10/11/2021: Left-sided Mediport placed   -Sequential chemotherapy: Cisplatin/docetaxel (every 3 weeks x4 cycles) (10/18/2021-12/22/2021)   -11/17/2021: CT A/P with contrast (epigastric pain): (Comparison: 08/06/2021): Constipation   -12/27/2021: Restaging CT C/A/P with contrast (comparison: 09/30/2021): Significantly improved subcarinal/right hilar soft tissue; improved irregular right perihilar opacities; trace right pleural effusion   -Consolidation durvalumab started 01/25/2022 (every 4 weeks)   -03/18/2022: Surveillance chest CT with contrast (comparison: 12/27/2021): Moderate right pleural effusion, larger than before; increased irregular right perihilar opacities particularly anteriorly, may be treatment related; infectious/inflammatory patchy groundglass opacities left lower lung lobe   -01/25/2022: TSH and free T4 normal  -03/22/2022: TSH, free T4 normal  -04/05/2022: CT A/P with contrast (comparison: 12/27/2021): Right-sided pleural effusion that appears loculated; stable left renal cyst  -04/05/2022: Whole-body nuclear medicine bone scan (comparison: Bone scan 09/11/2021): No bone metastasis  -Cycle #4 of consolidation durvalumab on 04/19/2022  -04/22/2022: Right thoracentesis: 40 cc, yellow, clear fluid drained (albumin 3.3; amylase 57; glucose 103; ; protein 5.4)      09/20/2021:   Presents for initial medical oncology consultation, accompanied by his wife.  Very pleasant gentleman.  In no acute discomfort.  Looks chronically ill.   -Rates his general health as 7 on a scale of 1-10, if 10 could be the best.    -Cough.  Bad.  Yellow phlegm.  No hemoptysis.  Comes in fits.  Smoked for several years; for last 2 months, 2 cigarettes daily.  Exertional dyspnea has improved with radiation treatment.   -Today, will  finish 2 weeks of hypofractionated palliative radiation therapy; dyspnea has improved with that.   -Fatigue.  ECOG 2.  Had to stop working in August when he could not work anymore because of progressive dyspnea and fatigue.   -Weight loss.  Has lost 35 pounds in last 1 year, including 15 pounds in last 3 months.  Appetite is so-so.  We will start Megace.   -No unusual headaches, focal neuro symptoms, chest pain, hemoptysis, abdominal pain, nausea, vomiting, GI bleeding, etc.        Interval history:     03/22/2022:   -Consolidation durvalumab started 01/25/2022 (every 4 weeks)   -03/18/2022: Surveillance chest CT with contrast (comparison: 12/27/2021): Moderate right pleural effusion, larger than before; increased irregular right perihilar opacities particularly anteriorly, may be treatment related; infectious/inflammatory patchy groundglass opacities left lower lung lobe   -01/25/2022: TSH and free T4 normal   03/22/2022: Labs reviewed; CMP unremarkable; TSH and free T4 normal; CBC unremarkable, hemoglobin 12.1   Presents for a follow-up visit, accompanied by his wife.  Last 3 or 4 weeks, has had persistent cough.  A couple of weeks ago, was coughing up greenish sputum; he went to an urgent care and was prescribed Levaquin for a few days.  Purulent sputum has resolved but cough persists.  He stopped smoking in August of last year.  He works in construction.  Able to work without any restriction but every other day, he feels worn down from working the previous day.  No unusual headaches or focal neurological symptoms.  Fair appetite.  No side effects with ongoing consolidation with durvalumab.    05/16/2022:  -03/22/2022: TSH, free T4 normal  -04/05/2022: CT A/P with contrast (comparison: 12/27/2021): Right-sided pleural effusion that appears loculated; stable left renal cyst  -04/05/2022: Whole-body nuclear medicine bone scan (comparison: Bone scan 09/11/2021): No bone metastasis  -Cycle #4 of consolidation  durvalumab on 04/19/2022  -04/22/2022: Right thoracentesis: 40 cc, yellow, clear fluid drained (albumin 3.3; amylase 57; glucose 103; ; protein 5.4)  -05/16/2022:  Labs reviewed; hemoglobin 30.8; CBC and CMP/B unremarkable; glucose 62 mg/dL  Presents for follow-up visit, accompanied by his wife.  In no acute discomfort.  Overall, doing well.  ECOG 1. Some right-sided pleuritic chest pain.  No fevers, chills, or hemoptysis.  Does not smoke.  Chronic stable mild-to-moderate dyspnea.  Some numbness and tingling in feet.  No unusual headaches or focal neurological symptoms.  Good appetite.  Wants to have a couple of teeth pulled out which which are apparently sticking into the sinuses.       Review of systems:   All systems reviewed, and found to be negative except for the symptoms detailed above.          Physical examination:   VITAL SIGNS:  Reviewed.       GENERAL:  In no apparent distress.     HEAD:  No signs of head trauma.   EYES:  Pupils are equal.  Extraocular motions intact.     EARS:  Hearing grossly intact.   MOUTH:  Oropharynx is normal.    NECK:  No adenopathy, no JVD.      CHEST:  Chest with clear breath sounds bilaterally.  No wheezes, rales, or rhonchi.     CARDIAC:  Regular rate and rhythm.  S1 and S2, without murmurs, gallops, or rubs.   VASCULAR:  No Edema.  Peripheral pulses normal and equal in all extremities.   ABDOMEN:  Soft, without detectable tenderness.  No sign of distention.  No   rebound or guarding, and no masses palpated.   Bowel Sounds normal.   MUSCULOSKELETAL:  Good range of motion of all major joints. Extremities without clubbing, cyanosis or edema.     NEUROLOGIC EXAM:  Alert and oriented x 3.  No focal sensory or strength deficits.   Speech normal.  Follows commands.   PSYCHIATRIC:  Mood normal.SKIN:  No rash or lesions.  09/30/2021: 30 minutes.  In no acute discomfort.  Looks chronically ill.  Digital clubbing noted.  Breath sounds diminished right lung base.  Rhonchi left  lung field.  No palpable lymphadenopathy.      Assessment:   Locally advanced, obviously inoperable squamous cell carcinoma of lung;   large, 10 cm mediastinal mass, possibly arising in the right lower lung lobe, causing compressive effects on right mainstem bronchus and right pulmonary artery;   no metastasis;   s/p bronchoscopic biopsy of carinal mass 08/18/2021;   large fungating mass at maximiliano, obliterating the right mainstem bronchus and partially occluding left mainstem;   causing hyponatremia, hypercalcemia;   worsening dyspnea, fever, postobstructive pneumonia    # squamous cell carcinoma of lung:  To summarize:  -bronchoscopy 08/18/2021  -10 cm subcarinal mass with compressive effects, right lower lung lobe large masslike consolidation with central cavitation, large fungating mass at maximiliano, obliterating the right mainstem bronchus and partially occluding left mainstem  -stage IIIB or IIIC  -postobstructive pneumonia and hospitalization  -S/P laser tumor debulking at [facility] 08/27/2021  -hyponatremia secondary to SIADH  -hypercalcemia  -S/P palliative radiotherapy 09/2021, pending definitive concurrent chemoradiation therapy, with subjective and objective improvement  -did not receive concurrent chemotherapy with radiotherapy  -Followed by sequential chemotherapy with cisplatin/docetaxel q. 3 weeks x4 cycles (10/2021-12/2021)  -with considerable improvement  -consolidation durvalumab started 01/25/2022) q.4 weeks)      #Molecular markers:   -EGFR mutation negative; PD-L1 negative (TPS 0%); KRAS mutation negative;   -ALK gene rearrangement negative; ROS1 gene rearrangement negative; NTRK NGS fusion panel negative   -BRAF mutation negative; RET gene rearrangement not detected          #Microcytic anemia (relative iron deficiency +/- anemia of chronic disease/malignancy):   -Hemoglobin 6.8-9.3   -MCV 79.7   -MCH, MCHC low   -Stool for occult blood negative x2 (09/2021)   -B12 normal, 755 (09/06/2021)   -Serum  iron 9, TIBC 157, transferrin saturation 6%, ferritin 795.5 (09/06/2021)   -Folate low, 6.5 (09/06/2021   -09/30/2021: Serum iron 24, TIBC 220, transferrin saturation 11%, ferritin 714.65 (anemia of chronic disease/relative iron deficiency); B12 level 1046; RBC folate 1147; hemoglobin 10.5          #Hypercalcemia of malignancy:   Calcium level:   -08/06/2021: 10.3 (albumin 2.5)   -9.6-10.2 between 08/07/2021-09/08/2021   -09/09/2021: 10.5 (albumin 1.8)   -09/12/2021: 10.6 (albumin 2.2)   -09/21/2021: 8.2 (albumin 2.1)   -08/07/2021: Intact PTH level normal, 12.7; intact PTH level <2.0   -09/10/2021: PTH needed peptide <2.0   -Zometa 4 mg IV x1 (09/12/2021) >> hypercalcemia resolved    -09/30/2021: Ionized calcium level 5.1, normal (Serum calcium 9.3, albumin 2.6, corrected calcium 10.42)          #Thrombocytosis:   (Subsequently, spontaneously resolved)   -Platelet count 413-588K   -Almost certainly, secondary to underlying malignancy and relative iron deficiency   -09/30/2021: ELDON-2 mutation negative   -10/08/2021: BCR-ABL1 1 fusion transcripts negative          #Hyponatremia secondary to SIADH:   -Sodium 128-130   -Treated with fluid restriction, salt tablets (by nephrology)      Plan:    -03/18/2022: Surveillance CT chest with contrast: Moderate right pleural effusion, larger than before  -04/22/2022:  Right thoracentesis, 40 cc, clear, albumin 3.3, glucose 103, , protein 5.4 (exudative) (apparently, cytology was not sent)  -Will follow radiologically   >>>  -Continue consolidation immunotherapy with durvalumab 1500 mg IV every 4 weeks for up to 12 months, pending analysis of right pleural fluid   -Check TSH level every 2 months (next, around 05/22/2022)     Consolidation immunotherapy for patients with unresectable stage II/III NSCLC, PS 0-1, and no disease progression after definitive concurrent chemoradiation   Durvalumab 10 mg/kg IV every 2 weeks or 1500 mg every 4 weeks for up to 12 months (patients  with a body weight of 30 kg or > 30 kg) (category 1 for stage III; category 2A for stage II)   This maximizes disease-free survival, 12 month progression free survival, 18 month progression free survival, higher response rate, longer median duration of response, and longer median time to death or distant metastasis.     Initiate surveillance for stage III non-small cell lung cancer, treated with chemoradiation therapy   -Completed treatment 12/2021   -History and physical and chest CT +/- contrast every 3 months for 3 years (12/2021-12/2024), then every 6 months for 2 years, then history and physical and low-dose noncontrast enhanced chest CT annually   >>>  -repeat noncontrast chest CT for surveillance in 3 months (mid June)      -Microcytic anemia   -Anemia of chronic disease/relative iron deficiency     Hypercalcemia of malignancy   -S/p Zometa 4 mg IV x1 (09/12/2021)     Thrombocytosis   -Almost certainly, reactive (iron deficiency anemia, malignancy)   -JAK2 mutation negative   -BCR-ABL 1 fusion transcripts negative     Hyponatremia secondary to SIADH secondary to malignancy   -Managed by renal; continues to take salt tablets   -Continue fluid restriction, will monitor sodium level    Follow-up with NP in 1 month, with CBC and CMP.    Above discussed with him.  All questions answered.  He understands and agrees with this plan.   ---------------      Surveillance:   Discussion: Surveillance for stage I-II non-small cell lung cancer (primary treatment included radiotherapy) or stage III or stage IV (oligo metastatic with all sites treated with definitive intent):     As per NCCN guidelines, history and physical and chest CT with or without contrast every 3-6 months for 3 years; then history and physical and chest CT with or without contrast every 6 months for 2 years; then history and physical and a low-dose noncontrast enhanced chest CT annually.  Residual or new radiographic abnormalities may require more  frequent imaging.  PET CT scan or brain MRI scan not routinely indicated.  However, many benign conditions like atelectasis/consolidation/radiation fibrosis are difficult to differentiate from neoplasm on standard CT imaging, and PET CT scan can be used to differentiate to malignancy in these settings.  However, if PET CT scan is to be used as a problem solving tool in patients of radiation therapy, then histopathologic confirmation of recurrent disease is needed because it area as previously treated with radiation therapy can remain FDG avid for up to 2 years.

## 2022-05-16 ENCOUNTER — OFFICE VISIT (OUTPATIENT)
Dept: HEMATOLOGY/ONCOLOGY | Facility: CLINIC | Age: 47
End: 2022-05-16
Payer: MEDICAID

## 2022-05-16 ENCOUNTER — DOCUMENTATION ONLY (OUTPATIENT)
Dept: HEMATOLOGY/ONCOLOGY | Facility: CLINIC | Age: 47
End: 2022-05-16

## 2022-05-16 DIAGNOSIS — D63.8 ANEMIA OF CHRONIC DISEASE: ICD-10-CM

## 2022-05-16 DIAGNOSIS — C34.90 SQUAMOUS CELL CARCINOMA OF LUNG, UNSPECIFIED LATERALITY: Primary | ICD-10-CM

## 2022-05-16 DIAGNOSIS — E83.52 HYPERCALCEMIA: ICD-10-CM

## 2022-05-16 DIAGNOSIS — E22.2 SIADH (SYNDROME OF INAPPROPRIATE ADH PRODUCTION): ICD-10-CM

## 2022-05-16 DIAGNOSIS — D50.9 IRON DEFICIENCY ANEMIA, UNSPECIFIED IRON DEFICIENCY ANEMIA TYPE: ICD-10-CM

## 2022-05-16 PROCEDURE — 1159F MED LIST DOCD IN RCRD: CPT | Mod: CPTII,,, | Performed by: INTERNAL MEDICINE

## 2022-05-16 PROCEDURE — 99214 OFFICE O/P EST MOD 30 MIN: CPT | Mod: S$PBB,,, | Performed by: INTERNAL MEDICINE

## 2022-05-16 PROCEDURE — 1160F RVW MEDS BY RX/DR IN RCRD: CPT | Mod: CPTII,,, | Performed by: INTERNAL MEDICINE

## 2022-05-16 PROCEDURE — 85025 COMPLETE CBC W/AUTO DIFF WBC: CPT | Performed by: INTERNAL MEDICINE

## 2022-05-16 PROCEDURE — 99213 OFFICE O/P EST LOW 20 MIN: CPT | Mod: PBBFAC | Performed by: INTERNAL MEDICINE

## 2022-05-16 PROCEDURE — 1160F PR REVIEW ALL MEDS BY PRESCRIBER/CLIN PHARMACIST DOCUMENTED: ICD-10-PCS | Mod: CPTII,,, | Performed by: INTERNAL MEDICINE

## 2022-05-16 PROCEDURE — 99214 PR OFFICE/OUTPT VISIT, EST, LEVL IV, 30-39 MIN: ICD-10-PCS | Mod: S$PBB,,, | Performed by: INTERNAL MEDICINE

## 2022-05-16 PROCEDURE — 1159F PR MEDICATION LIST DOCUMENTED IN MEDICAL RECORD: ICD-10-PCS | Mod: CPTII,,, | Performed by: INTERNAL MEDICINE

## 2022-05-16 PROCEDURE — 83735 ASSAY OF MAGNESIUM: CPT | Performed by: INTERNAL MEDICINE

## 2022-05-16 PROCEDURE — 80053 COMPREHEN METABOLIC PANEL: CPT | Performed by: INTERNAL MEDICINE

## 2022-05-16 RX ORDER — MEGESTROL ACETATE 40 MG/ML
800 SUSPENSION ORAL
COMMUNITY
Start: 2021-09-30 | End: 2022-07-21 | Stop reason: ALTCHOICE

## 2022-05-16 RX ORDER — DIPHENOXYLATE HYDROCHLORIDE AND ATROPINE SULFATE 2.5; .025 MG/1; MG/1
TABLET ORAL
COMMUNITY
Start: 2021-12-01 | End: 2022-07-21 | Stop reason: ALTCHOICE

## 2022-05-16 RX ORDER — FAMOTIDINE 20 MG/1
20 TABLET, FILM COATED ORAL
COMMUNITY
Start: 2021-09-05 | End: 2022-07-21 | Stop reason: ALTCHOICE

## 2022-05-16 RX ORDER — SODIUM CHLORIDE 0.9 % (FLUSH) 0.9 %
10 SYRINGE (ML) INJECTION
Status: CANCELLED | OUTPATIENT
Start: 2022-06-14

## 2022-05-16 RX ORDER — DIPHENHYDRAMINE HYDROCHLORIDE 50 MG/ML
50 INJECTION INTRAMUSCULAR; INTRAVENOUS ONCE AS NEEDED
Status: CANCELLED | OUTPATIENT
Start: 2022-06-14

## 2022-05-16 RX ORDER — METOCLOPRAMIDE 10 MG/1
10 TABLET ORAL
COMMUNITY
Start: 2022-03-08 | End: 2022-07-21 | Stop reason: ALTCHOICE

## 2022-05-16 RX ORDER — LEVOFLOXACIN 750 MG/1
TABLET ORAL
COMMUNITY
Start: 2022-03-08 | End: 2022-07-21 | Stop reason: ALTCHOICE

## 2022-05-16 RX ORDER — PANTOPRAZOLE SODIUM 40 MG/1
40 TABLET, DELAYED RELEASE ORAL
COMMUNITY
Start: 2021-11-10 | End: 2022-07-21 | Stop reason: ALTCHOICE

## 2022-05-16 RX ORDER — GABAPENTIN 300 MG/1
300 CAPSULE ORAL 3 TIMES DAILY
COMMUNITY
Start: 2022-04-11 | End: 2022-07-21 | Stop reason: ALTCHOICE

## 2022-05-16 RX ORDER — HEPARIN 100 UNIT/ML
500 SYRINGE INTRAVENOUS
Status: CANCELLED | OUTPATIENT
Start: 2022-06-14

## 2022-05-16 RX ORDER — EPINEPHRINE 0.3 MG/.3ML
0.3 INJECTION SUBCUTANEOUS ONCE AS NEEDED
Status: CANCELLED | OUTPATIENT
Start: 2022-06-14

## 2022-05-16 NOTE — PROGRESS NOTES
Orders for today:    Continue durvalumab every month  Check TSH today, then every 2 months  Noncontrast chest CT for surveillance in mid June  Follow-up with NP in 1 month.

## 2022-05-17 ENCOUNTER — INFUSION (OUTPATIENT)
Dept: INFUSION THERAPY | Facility: HOSPITAL | Age: 47
End: 2022-05-17
Attending: INTERNAL MEDICINE
Payer: MEDICAID

## 2022-05-17 VITALS
OXYGEN SATURATION: 100 % | RESPIRATION RATE: 20 BRPM | HEIGHT: 65 IN | WEIGHT: 116.88 LBS | SYSTOLIC BLOOD PRESSURE: 120 MMHG | HEART RATE: 81 BPM | TEMPERATURE: 99 F | BODY MASS INDEX: 19.47 KG/M2 | DIASTOLIC BLOOD PRESSURE: 82 MMHG

## 2022-05-17 DIAGNOSIS — C34.90 SQUAMOUS CELL CARCINOMA OF LUNG, UNSPECIFIED LATERALITY: Primary | ICD-10-CM

## 2022-05-17 PROCEDURE — 63600175 PHARM REV CODE 636 W HCPCS

## 2022-05-17 PROCEDURE — A4216 STERILE WATER/SALINE, 10 ML: HCPCS | Performed by: INTERNAL MEDICINE

## 2022-05-17 PROCEDURE — 25000003 PHARM REV CODE 250: Performed by: INTERNAL MEDICINE

## 2022-05-17 PROCEDURE — 96413 CHEMO IV INFUSION 1 HR: CPT

## 2022-05-17 PROCEDURE — 63600175 PHARM REV CODE 636 W HCPCS: Mod: JG | Performed by: INTERNAL MEDICINE

## 2022-05-17 RX ORDER — DIPHENHYDRAMINE HYDROCHLORIDE 50 MG/ML
50 INJECTION INTRAMUSCULAR; INTRAVENOUS ONCE AS NEEDED
Status: CANCELLED | OUTPATIENT
Start: 2022-06-14

## 2022-05-17 RX ORDER — HEPARIN SODIUM (PORCINE) LOCK FLUSH IV SOLN 100 UNIT/ML 100 UNIT/ML
SOLUTION INTRAVENOUS
Status: COMPLETED
Start: 2022-05-17 | End: 2022-05-17

## 2022-05-17 RX ORDER — EPINEPHRINE 0.3 MG/.3ML
0.3 INJECTION SUBCUTANEOUS ONCE AS NEEDED
Status: CANCELLED | OUTPATIENT
Start: 2022-06-14

## 2022-05-17 RX ORDER — HEPARIN 100 UNIT/ML
500 SYRINGE INTRAVENOUS
Status: CANCELLED | OUTPATIENT
Start: 2022-06-14

## 2022-05-17 RX ORDER — HEPARIN SODIUM (PORCINE) LOCK FLUSH IV SOLN 100 UNIT/ML 100 UNIT/ML
100 SOLUTION INTRAVENOUS
Status: DISCONTINUED | OUTPATIENT
Start: 2022-05-17 | End: 2022-05-17 | Stop reason: HOSPADM

## 2022-05-17 RX ORDER — DIPHENHYDRAMINE HYDROCHLORIDE 50 MG/ML
50 INJECTION INTRAMUSCULAR; INTRAVENOUS ONCE AS NEEDED
Status: DISCONTINUED | OUTPATIENT
Start: 2022-05-17 | End: 2022-05-17 | Stop reason: HOSPADM

## 2022-05-17 RX ORDER — SODIUM CHLORIDE 0.9 % (FLUSH) 0.9 %
10 SYRINGE (ML) INJECTION
Status: CANCELLED | OUTPATIENT
Start: 2022-06-14

## 2022-05-17 RX ORDER — EPINEPHRINE 0.3 MG/.3ML
0.3 INJECTION SUBCUTANEOUS ONCE AS NEEDED
Status: DISCONTINUED | OUTPATIENT
Start: 2022-05-17 | End: 2022-05-17 | Stop reason: HOSPADM

## 2022-05-17 RX ORDER — SODIUM CHLORIDE 0.9 % (FLUSH) 0.9 %
10 SYRINGE (ML) INJECTION
Status: DISCONTINUED | OUTPATIENT
Start: 2022-05-17 | End: 2022-05-17 | Stop reason: HOSPADM

## 2022-05-17 RX ADMIN — SODIUM CHLORIDE: 9 INJECTION, SOLUTION INTRAVENOUS at 12:05

## 2022-05-17 RX ADMIN — DURVALUMAB 1500 MG: 500 INJECTION, SOLUTION INTRAVENOUS at 01:05

## 2022-05-17 RX ADMIN — HEPARIN SODIUM (PORCINE) LOCK FLUSH IV SOLN 100 UNIT/ML 100 UNITS: 100 SOLUTION at 02:05

## 2022-05-17 RX ADMIN — SODIUM CHLORIDE, PRESERVATIVE FREE 10 ML: 5 INJECTION INTRAVENOUS at 02:05

## 2022-05-20 NOTE — HISTORICAL [facility] CERNER
This is a historical note converted from Sami. Formatting and pictures may have been removed.  Please reference Sami for original formatting and attached multimedia. Chief Complaint  lung cancer; follow up  History of Present Illness  ?  Problem List:  Stage IIIB or stage IIIC Squamous cell carcinoma of right lung diagnosed 8/18/2021  ?   Current Treatment Plan:  Imfinzi (durvalumab)?1500 mg?IV every 4 weeks for up to 12 months  Started?1/25/2022  ?   Treatment History:  S/p laser tumor debulking at [facility] 08/27/2021  S/p hypofractionated?palliative radiation therapy (09/13/2021-09/30/2021)  Cisplatin/docetaxel (every 3 weeks x4 cycles) (10/18/2021-12/22/2021)  ?  ?  Interval history: Seen in follow up today, 4/18/22; accompanied by spouse. Due for C4 Durvalumab tomorrow. Was to follow up today after thoracentesis however reportedly not done due to needed CXR. Overall tolerating treatment well; no new significant symptoms; relatively stable symptoms of intermittent ALFONSO, voice deepening and easy bruising. Denies other problems or concerns  Review of Systems  Negative except as otherwise stated in HPI  Physical Exam  Vitals & Measurements  T:?36.5? ?C (Oral)? HR:?69(Peripheral)? RR:?20? BP:?146/92?  HT:?165.10?cm? WT:?53.500?kg? BMI:?19.63?  General: NAD  Eye: Pupils are equal, round and reactive to light, Extraocular movements intact, normal conjunctiva  HENT: Normocephalic. Oropharynx exam deferred; mask in place due to coronavirus pandemic  Neck: Supple, nontender  Respiratory: Respirations are non-labored, RLL diminished breath sounds, Symmetrical chest wall expansion  Cardiovascular: RRR, normal peripheral perfusion, No edema  Gastrointestinal: Non tender, non distended; present bowel sounds  Genitourinary: Exam deferred  Lymphatics: No palpable adenopathies appreciated  Musculoskeletal: Moves all extremities  Integumentary: Intact  Neurologic: Alert and oriented. No focal defects  Psychiatric: Cooperative.  Appropriate mood and affect?  Assessment/Plan  Malignant neoplasm of unspecified part of right bronchus or lung?  -Locally advanced, obviously inoperable squamous cell carcinoma of lung;  large, 10 cm mediastinal mass, possibly arising in the right lower lung lobe, causing compressive effects on?right mainstem bronchus and right pulmonary artery;  no metastasis;  s/p bronchoscopic biopsy of carinal?mass 08/18/2021  large fungating mass at maximiliano, obliterating the right mainstem bronchus and partially occluding left mainstem;  causing hyponatremia, hypercalcemia;  worsening dyspnea, fever, postobstructive pneumonia  -S/p laser tumor debulking at [facility] 08/27/2021  -Semiurgently, started on radiation therapy, pending concurrent chemotherapy,?given?complication of right mainstem bronchus, complete obliteration of right lower lobe bronchi, causing postobstructive pneumonia, as well as to prevent?precipitous?respiratory insufficiency?(subsequently, with good?subjective and objective response)  >>  -Tumor >7 cm; tumor invading mediastinum; therefore, cT4  -Mediastinal mass, malignant gosia complex versus extension of right lower lobe mass, therefore, cN2 or cN3  -M0  >>> ?Stage IIIB or stage IIIC  -Completed 2 weeks of hypofractionated?palliative radiation therapy (09/13/2021-09/30/2021);?exertional dyspnea improved)  (Excellent subjective and objective response to radiotherapy)  (Did not receive concurrent chemotherapy?with RT)  -25 pound weight loss over 1 year, including 15 pound weight loss?in last 3 months; anorexic  -09/20/2021:?ECOG 2; exertional dyspnea has improved with palliative radiation therapy?(completed?2 weeks of hypofractionated?radiotherapy?09/30/2021)  -09/30/2021: PET/CT (comparison: CT C/A/P 08/06/2021): Hypermetabolic subcarinal mass with FDG avid soft tissue extending to the right hilar region (5 cm subcarinal mass, extends along the right mainstem bronchus and right upper lobe bronchus with the  right perihilar region); possible 8 mm FDG avid lymph node anterior to the right mainstem bronchus; no distant sites of FDG avid metastatic disease; multifocal right mid and lower lung consolidation with no hypermetabolic component appreciated; small to moderate right pleural effusion  (PET/CT:?Subcarinal mass 5 cm, was >7 cm to start with, thus, shrunk with radiation treatment; 8 mm?mediastinal lymph node; no distant metastasis)  -BRAF mutation negative  -RET gene rearrangement not detected  -10/11/2021: Left-sided Mediport placed  -Sequential chemotherapy: Cisplatin/docetaxel (every 3 weeks x4 cycles) (10/18/2021-12/22/2021)  -11/17/2021: CT A/P with contrast (epigastric pain): (Comparison: 08/06/2021): Constipation  -12/27/2021: Restaging CT C/A/P with contrast (comparison: 09/30/2021): Significantly improved subcarinal/right hilar soft tissue; improved irregular right perihilar opacities; trace right pleural effusion  -Imfinzi (durvalumab)?1500 mg?IV every 4 weeks for up to 12 months; started?1/25/2022  ?   Molecular markers:  -EGFR mutation negative; PD-L1 negative (TPS 0%); KRAS mutation negative;  -ALK gene rearrangement negative; ROS1 gene rearrangement negative; NTRK NGS fusion panel negative  -BRAF mutation negative;?RET gene rearrangement not detected  ?   Microcytic anemia  -Anemia of chronic disease/relative iron deficiency  ?   Hypercalcemia of malignancy  -S/p Zometa 4 mg IV x1 (09/12/2021)  ?   Thrombocytosis  -Almost certainly, reactive (iron deficiency anemia, malignancy)  -JAK2 mutation negative  -BCR-ABL 1 fusion transcripts negative  ?   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 daily to boost appetite  -01/14/2022:?No longer on  Megace; appetite normalized?concurrent with?significant improvement?in cancer with chemoradiation therapy  ?  ?  PLAN:  - Clinically doing well today; for now will continue treatment plan on Durvalumab (due tomorrow); we discussed possible treatment plan change pending cytology results from [facility] posadas.  - CBC, CMP today - will call w/results and review for pending treatment tomorrow  - Stat CXR (will call pt with results); referral for US guided thoracentesis - send fluid?for cytology, cell count, differential, Gram stain, culture, glucose, protein,?albumin, LDH, amylase.  - Reviewed/discussed results of CT A/P and bone scan from 4/18/22 showing R pleural effusion appearing loculated, no evidence of metastatic disease or bone mets  - RTC in 4 wks for labs (CBC, CMP, MG, TSH, Free T4) ? Durvalumab. Monitor TSH every couple of months  ?  ?  Surveillance:  Discussion: Surveillance for stage I-II non-small cell lung cancer (primary treatment included radiotherapy) or stage III or stage IV (oligo metastatic with all sites treated with definitive intent):  ?  As per NCCN guidelines, history and physical and chest CT with or without contrast every 3-6 months for 3 years; then history and physical and chest CT with or without contrast every 6 months for 2 years; then history and physical and a low-dose noncontrast enhanced chest CT annually.? Residual or new radiographic abnormalities may require more frequent imaging.? PET CT scan or brain MRI scan not routinely indicated.? However, many benign conditions like atelectasis/consolidation/radiation fibrosis are difficult to differentiate from neoplasm on standard CT imaging, and PET CT scan can be used to differentiate to malignancy in these settings.? However, if PET CT scan is to be used as a problem solving tool in patients of radiation therapy, then histopathologic confirmation of recurrent disease is needed because it area as previously treated with radiation therapy can  remain FDG avid for up to 2 years.  ?  ?  ?   Problem List/Past Medical History  Ongoing  ADD (attention deficit disorder)  Cachexia  Iron deficiency anemia  Lung mass  Malignant neoplasm of unspecified part of right bronchus or lung  SIADH (syndrome of inappropriate ADH production)  Tobacco user  Historical  No qualifying data  Procedure/Surgical History  Catheter Insertion Mediport (None) (10/11/2021)  Fluoroscopy of Superior Vena Cava using Low Osmolar Contrast, Guidance (10/11/2021)  Insertion of Infusion Device into Superior Vena Cava, Percutaneous Approach (10/11/2021)  Insertion of tunneled centrally inserted central venous access device, with subcutaneous port; age 5 years or older (10/11/2021)  Transfusion of Autologous Red Blood Cells into Peripheral Vein, Percutaneous Approach (09/09/2021)  Bronchoscopy w/ Endobronch Biopsy(s) (08/18/2021)  Bronchoscopy, rigid or flexible, including fluoroscopic guidance, when performed; with bronchial or endobronchial biopsy(s), single or multiple sites (08/18/2021)  Extraction of Shayna, Via Natural or Artificial Opening Endoscopic, Diagnostic (08/18/2021)  Tonsillectomy (1981)   Medications  albuterol 90 mcg/inh inhalation aerosol, 1 puff(s), INH, Once, PRN  atropine-diphenoxylate 0.025 mg-2.5 mg oral tablet, 1 tab(s), Oral, QID  Carafate 1 g oral tablet, 1 gm= 1 tab(s), Oral, QIDACHS, 2 refills,? ?Not taking  famotidine 20 mg oral tablet, 20 mg= 1 tab(s), Oral, BID,? ?Not taking  gabapentin 300 mg oral capsule, 300 mg= 1 cap(s), Oral, TID, 3 refills  megestrol 40 mg/mL oral suspension, 800 mg= 20 mL, Oral, Daily,? ?Not taking  metoclopramide 10 mg oral tablet, 10 mg= 1 tab(s), Oral, QID,? ?Not Taking, Completed Rx  Protonix 40 mg ORAL enteric coated tablet, 40 mg= 1 tab(s), Oral, Daily, 1 refills,? ?Not taking  Allergies  penicillin?(unknown)  Social History  Abuse/Neglect  No, No, Yes, 04/18/2022  Alcohol  Past, 04/18/2022  Employment/School  Unemployed,  08/16/2021  Exercise  Exercise duration: 0., 08/16/2021  Financial/Legal Situation  None, 08/16/2021  Home/Environment  Lives with Spouse., 08/16/2021    Never in , 08/16/2021  Nutrition/Health  Regular, Fair, 08/16/2021  Spiritual/Cultural  Restoration, 08/16/2021  Substance Use  Never, 04/18/2022  Tobacco  Former smoker, quit more than 30 days ago, No, 04/18/2022  Family History  Family history is negative  Immunizations  Vaccine Date Status   tetanus/diphtheria/pertussis, acel(Tdap) 07/31/2017 Given   Health Maintenance  Health Maintenance  ???Pending?(in the next year)  ??? ??OverDue  ??? ? ? ?Smoking Cessation due??01/01/22??and every 1??year(s)  ??? ? ? ?Alcohol Misuse Screening due??01/02/22??and every 1??year(s)  ??? ??Due?  ??? ? ? ?Lipid Screening due??04/18/22??Unknown Frequency  ??? ??Due In Future?  ??? ? ? ?ADL Screening not due until??08/16/22??and every 1??year(s)  ??? ? ? ?Aspirin Therapy for CVD Prevention not due until??10/05/22??and every 1??year(s)  ??? ? ? ?Obesity Screening not due until??01/01/23??and every 1??year(s)  ??? ? ? ?Hypertension Management-BMP not due until??03/22/23??and every 1??year(s)  ???Satisfied?(in the past 1 year)  ??? ??Satisfied?  ??? ? ? ?ADL Screening on??08/16/21.??Satisfied by Marcia Nobles  ??? ? ? ?Alcohol Misuse Screening on??10/05/21.??Satisfied by Moira Rojas  ??? ? ? ?Aspirin Therapy for CVD Prevention on??10/05/21.??Satisfied by Moira Rojas  ??? ? ? ?Blood Pressure Screening on??04/18/22.??Satisfied by Maggie Shin  ??? ? ? ?Body Mass Index Check on??04/18/22.??Satisfied by Maggie Shin  ??? ? ? ?Depression Screening on??04/18/22.??Satisfied by Maggie Shin  ??? ? ? ?Diabetes Screening on??03/22/22.??Satisfied by Augustina Pascual MT.  ??? ? ? ?Hypertension Management-Blood Pressure on??04/18/22.??Satisfied by Maggie Shin  ??? ? ? ?Influenza Vaccine on??03/22/22.??Satisfied by Lynn Fu  ??? ? ? ?Obesity Screening  on??04/18/22.??Satisfied by Mgagie Shin  ??? ? ? ?Smoking Cessation on??09/08/21.??Satisfied by VIRAJ Bolton, Tracie  ?  Lab Results  Labs to be done today following todays visit  Diagnostic Results  Imaging reveiwed/discussed w/Mr Pratt on 4/18/22  ?   NUCLEAR MEDICINE ?BONE SCAN: 4/5/22  HISTORY: Lung cancer. Hypercalcemia. Anemia. ?osseous metastases  Comparison: Nuclear medicine bone scan 9/11/2021, CT abdomen and  pelvis 4/5/2022, CT chest 3/18/2022  ?  27.3mCi of Tc-99m MDP multi projection imaging provided  ?  FINDINGS:  Physiologic uptake is seen. No suspicious areas of increased  radiotracer uptake seen. Probable mild degenerative periarticular  uptake.  ?  There is renal uptake and excretion.?  ?  IMPRESSION:  No findings suspicious for osseous metastatic disease  ?  ?  CT of the abdomen and pelvis without oral after IV contrast only - 4/5/22  ?  Clinical history is lung cancer.  ?  Automatic exposure control.  ?  The DLP is 193.  ?  This is compared to a previous study from 12/27/2021.  ?  There is a small right-sided pleural effusion which appears loculated.  This is increased from the previous study. There is a small  pericardial effusion. Liver appears normal. Spleen appears normal.  Pancreas appears normal. The adrenals are not enlarged. Kidneys show a  lower pole cyst on the left, stable from the previous exam. Otherwise  Appear normal. Aorta shows calcification without an aneurysm present.  ?  IMPRESSION: Right-sided pleural effusion that appears loculated.  ?  Stable left renal cyst.  ?

## 2022-05-20 NOTE — HISTORICAL [facility] CERNER
This is a historical note converted from Sami. Formatting and pictures may have been removed.  Please reference [facility] for original formatting and attached multimedia. History of Present Illness  Past medical history: Attention deficit disorder. Tobacco abuse. Tonsillectomy.  Social history:?. ?Lives in [place], [place]. ?Has 2 children.??Has worked as a  all his life?(more than 30-35 years).??Smoked 1-1/2 packs of cigarettes daily for 15 years;?then, 1 pack daily for 1 year;?now, for last 2 months, 2 cigarettes daily.? Heavy alcohol use in the past;?8-10 beers daily?for 10 years;?quit 1-1/2 years ago.? No illicit drugs.  Family history:?No family members with cancer.  Health maintenance:?So far, does not have a primary care physician.? Apparently, he is going to see a primary care physician for the first time next week.  ?  ?   Reason for follow-up:  -Squamous cell carcinoma of lung, stage III  -Microcytic anemia (relative iron deficiency)  -Hypercalcemia of malignancy  -Thrombocytosis  -Hyponatremia (SIADH)  ?  ?  History of present illness:  46-year-old , smoker, presented to urgent care 08/06/2021 with complaint of shortness of breath, weakness, and 20 pound unintentional weight loss over last 1 year. Productive cough. White sputum. Night sweats and fever. Was referred to ED at [facility] for further work-up. Noted to have hypercalcemia, hyponatremia, and neutrophilic leukocytosis. Work-up showed large heterogeneous mediastinal mass and right lower lobe mass.  ?  He followed up in lung mass clinic. Bronchoscopy showed carinal mass. Biopsy showed poorly differentiated squamous cell carcinoma.  ?  Was referred to [facility].  Pulmonology performed laser tumor debulking to open up the left lung.  ?  Subsequently, admitted to [facility] 09/05/2021 with worsening shortness of breath, right-sided chest pain, and postobstructive pneumonia.  ?  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; SIADH, hyponatremia, cancer related to hypercalcemia, postobstructive pneumonia, neutrophilic leukocytosis, acute on chronic microcytic anemia, s/p PRBC x2 units, cancer-related pain, unintentional weight loss secondary to cancer  -Hospitalized with worsening dyspnea, fever, productive cough; previous history of pneumonia a month back; large mediastinal and lung mass on CT; underwent bronchoscopy; biopsy showed squamous cell carcinoma, locally advanced, unresectable; treated with antibiotics; fluid restriction and salt tablets for hyponatremia; zoledronic acid x1 on 09/12/2021 for hypercalcemia; microcytic, iron deficiency anemia with stool for occult blood negative  ?  08/06/2021: CT chest with contrast:  -Mediastinal subcarinal location large heterogeneous mass (10.0 x 6.8 x 7.0 cm) may represent malignant gosia complex and causes compressive effect upon right mainstem bronchus and right lower lobe pulmonary artery branches (also, near complete obliteration of right lower lung lobe bronchi; also, partial narrowing of main pulmonary artery)  -Right lower lung lobe large masslike consolidation with central cavitations new-lungs emphysematous bullous changes  -No acute findings in left lung  -Small volume right pleural effusion  ?  08/06/2021: CT A/P without contrast:  -Suboptimal exam due to lack of IV contrast  -No obvious metastatic disease in abdomen or pelvis  -Abdomen/pelvic ascites versus presenting edema suspected  -Abnormal appearance of right lung base  ?  08/18/2021: Bronchoscopy:  -Large fungating mass at maximiliano; obliterates the right mainstem bronchus and partially occluding left mainstem  ?  08/18/2021: Carinal mass, needle biopsy:  -Poorly differentiated carcinoma, favor squamous cell carcinoma  ?  09/10/2021: Brain MRI with and without contrast:  -No brain metastasis  -Heterogeneous calvarial marrow  enhancement may be physiologic, but rule out bone metastasis  ?  09/11/2021: Bone scan:  -No bone metastasis  ?   09/20/2021:  Presents for initial medical oncology consultation,?accompanied by his wife. ?Very pleasant gentleman.? In no acute discomfort.? Looks chronically ill.  -Rates his general health as 7 on a scale of 1-10,?if 10?could be the best.?  -Cough.? Bad.? Yellow phlegm. ?No hemoptysis.? Comes in fits.? Smoked for several years; for last 2 months, 2 cigarettes daily.? Exertional dyspnea has improved with radiation treatment.  -Today, will finish 2 weeks of?hypofractionated?palliative radiation therapy; dyspnea has improved with that.  -Fatigue. ?ECOG 2.? Had to stop working?in August?when he could not work anymore because of progressive dyspnea and fatigue.  -Weight loss. ?Has lost 35 pounds in last 1 year, including 15 pounds in last 3 months.? Appetite is so-so.? We will start Megace.  -No unusual headaches, focal neuro symptoms, chest pain, hemoptysis, abdominal pain, nausea, vomiting, GI bleeding, etc.  ?  ?   Interval history:  ?   03/22/2022:  -Consolidation durvalumab started 01/25/2022 (every 4 weeks)  -03/18/2022: Surveillance chest CT with contrast (comparison: 12/27/2021): Moderate right pleural effusion, larger than before; increased irregular right perihilar opacities particularly anteriorly, may be treatment related; infectious/inflammatory patchy groundglass opacities left lower lung lobe  -01/25/2022: TSH and free T4 normal  03/22/2022: Labs reviewed; CMP unremarkable; TSH and free T4 normal; CBC unremarkable, hemoglobin 12.1  Presents for a follow-up visit, accompanied by his wife.? Last 3 or 4 weeks, has had persistent cough.? A couple of weeks ago, was coughing up greenish sputum; he went to an urgent care and was prescribed Levaquin for a few days.? Purulent sputum has resolved but cough persists.? He stopped smoking in August of last year.? He works in construction.? Able to work  without any restriction but every other day, he feels worn down from working the previous day.? No unusual headaches or focal neurological symptoms.? Fair appetite.? No side effects with ongoing consolidation with durvalumab.  ?  ?  Review of systems:  All systems reviewed, and found to be negative except for the symptoms detailed above.  ?  ?  Physical examination:  VITAL SIGNS: ?Reviewed. ? ?  GENERAL: ?In no apparent distress. ?  HEAD: ?No signs of head trauma.  EYES: ?Pupils are equal. ?Extraocular motions intact. ?  EARS: ?Hearing grossly intact.  MOUTH: ?Oropharynx is normal.?  NECK: ?No adenopathy, no JVD. ??  CHEST: ?Chest with clear breath sounds bilaterally. ?No wheezes, rales, or rhonchi. ?  CARDIAC: ?Regular rate and rhythm. ?S1 and S2, without murmurs, gallops, or rubs.  VASCULAR: ?No Edema. ?Peripheral pulses normal and equal in all extremities.  ABDOMEN: ?Soft, without detectable tenderness. ?No sign of distention. ?No ? rebound or guarding, and no masses palpated. ? Bowel Sounds normal.  MUSCULOSKELETAL: ?Good range of motion of all major joints. Extremities without clubbing, cyanosis or edema. ?  NEUROLOGIC EXAM: ?Alert and oriented x 3. ?No focal sensory or strength deficits. ? Speech normal. ?Follows commands.  PSYCHIATRIC: ?Mood normal.SKIN: ?No rash or lesions.  09/30/2021:?30 minutes. ?In no acute discomfort.? Looks chronically ill.? Digital clubbing noted.? Breath sounds diminished right lung base. ?Rhonchi left?lung field.? No palpable lymphadenopathy.  ?  ?   Assessment:  Locally advanced, obviously inoperable squamous cell carcinoma of lung;  large, 10 cm mediastinal mass, possibly arising in the right lower lung lobe, causing compressive effects on?right mainstem bronchus and right pulmonary artery;  no metastasis;  s/p bronchoscopic biopsy of carinal?mass 08/18/2021;  large fungating mass at maximiliano, obliterating the right mainstem bronchus and partially occluding left mainstem;  causing  hyponatremia, hypercalcemia;  worsening dyspnea, fever, postobstructive pneumonia  ?  #Squamous cell carcinoma of lung:  -Bronchoscopy (08/18/2021). ?CT chest with contrast (08/06/2021). ?CT A/P without contrast (08/06/2021. ?Brain MRI (09/10/2021). ?Bone scan (09/11/2021).  -10.0 x 6.8 x 7.0 cm subcarinal mass, compressive effect upon right mainstem bronchus and right lower lobe pulmonary artery branches, complete obliteration of right lower lobe bronchi, partial narrowing of the main pulmonary artery  -Right lower lung lobe large masslike consolidation with central cavitations, without clear interface between the right lower lung lobe mass consolidation and the mediastinal mass  -On bronchoscopy, large fungating mass at maximiliano, obliterating the right mainstem bronchus and partially occluding left mainstem  -No metastasis on CTs, bone scan, brain MRI  -Causing postobstructive pneumonia and hospitalization  -S/p laser tumor [facility]king at [facility] 08/27/2021  -Causing hyponatremia secondary to SIADH and hypercalcemia  -Semiurgently, started on radiation therapy, pending concurrent chemotherapy,?given?complication of right mainstem bronchus, complete obliteration of right lower lobe bronchi, causing postobstructive pneumonia, as well as to prevent?precipitous?respiratory insufficiency?(subsequently, with good?subjective and objective response)  >>  -Tumor >7 cm; tumor invading mediastinum; therefore, cT4  -Mediastinal mass, malignant gosia complex versus extension of right lower lobe mass, therefore, cN2 or cN3  -M0  >>> Stage IIIB or stage IIIC  -Completed 2 weeks of hypofractionated?palliative radiation therapy (09/13/2021-09/30/2021);?exertional dyspnea improved)  (Excellent subjective and objective response to radiotherapy)  (Did not receive concurrent chemotherapy?with RT)  -25 pound weight loss over 1 year, including 15 pound weight loss?in last 3 months; anorexic  -09/20/2021:?ECOG 2; exertional dyspnea has  improved with palliative radiation therapy?(completed?2 weeks of hypofractionated?radiotherapy?09/30/2021)  -09/30/2021: PET/CT (comparison: CT C/A/P 08/06/2021): Hypermetabolic subcarinal mass with FDG avid soft tissue extending to the right hilar region (5 cm subcarinal mass, extends along the right mainstem bronchus and right upper lobe bronchus with the right perihilar region); possible 8 mm FDG avid lymph node anterior to the right mainstem bronchus; no distant sites of FDG avid metastatic disease; multifocal right mid and lower lung consolidation with no hypermetabolic component appreciated; small to moderate right pleural effusion  (PET/CT:?Subcarinal mass 5 cm, was >7 cm to start with, thus, shrunk with radiation treatment; 8 mm?mediastinal lymph node; no distant metastasis)  -BRAF mutation negative  -RET gene rearrangement not detected  -10/11/2021: Left-sided Mediport placed  -Sequential chemotherapy: Cisplatin/docetaxel (every 3 weeks x4 cycles) (10/18/2021-12/22/2021)  -11/17/2021: CT A/P with contrast (epigastric pain): (Comparison: 08/06/2021): Constipation  -12/27/2021: Restaging CT C/A/P with contrast (comparison: 09/30/2021): Significantly improved subcarinal/right hilar soft tissue; improved irregular right perihilar opacities; trace right pleural effusion  -Consolidation durvalumab started 01/25/2022 (every 4 weeks)  -03/18/2022: Surveillance chest CT with contrast (comparison: 12/27/2021): Moderate right pleural effusion, larger than before; increased irregular right perihilar opacities particularly anteriorly, may be treatment related; infectious/inflammatory patchy groundglass opacities left lower lung lobe  -01/25/2022: TSH and free T4 normal  ?  ?  #Molecular markers:  -EGFR mutation negative; PD-L1 negative (TPS 0%); KRAS mutation negative;  -ALK gene rearrangement negative; ROS1 gene rearrangement negative; NTRK NGS fusion panel negative  -BRAF mutation negative;?RET gene rearrangement not  detected  ?  ?  #Microcytic anemia (relative iron deficiency +/- anemia of chronic disease/malignancy):  -Hemoglobin 6.8-9.3  -MCV 79.7  -MCH, MCHC low  -Stool for occult blood negative x2 (09/2021)  -B12 normal, 755 (09/06/2021)  -Serum iron 9, TIBC 157, transferrin saturation 6%, ferritin 795.5 (09/06/2021)  -Folate low, 6.5 (09/06/2021  -09/30/2021: Serum iron 24, TIBC 220, transferrin saturation 11%, ferritin 714.65 (anemia of chronic disease/relative iron deficiency); B12 level 1046; RBC folate 1147; hemoglobin 10.5  ?  ?  #Hypercalcemia of malignancy:  Calcium level:  -08/06/2021: 10.3 (albumin 2.5)  -9.6-10.2 between 08/07/2021-09/08/2021  -09/09/2021: 10.5 (albumin 1.8)  -09/12/2021: 10.6 (albumin 2.2)  -09/21/2021: 8.2 (albumin 2.1)  -08/07/2021: Intact PTH level normal, 12.7; intact PTH level <2.0  -09/10/2021: PTH needed peptide <2.0  -Zometa 4 mg IV x1 (09/12/2021)?>> hypercalcemia resolved?  -09/30/2021:?Ionized calcium level 5.1, normal (Serum calcium 9.3, albumin 2.6, corrected calcium 10.42)  ?  ?  #Thrombocytosis:  (Subsequently, spontaneously resolved)  -Platelet count 413-588K  -Almost certainly, secondary to underlying malignancy and relative iron deficiency  -09/30/2021: ELDON-2 mutation negative  -10/08/2021: BCR-ABL1 1 fusion transcripts negative  ?  ?  #Hyponatremia secondary to SIADH:  -Sodium 128-130  -Treated with fluid restriction, salt tablets (by nephrology)  ?  ?  Plan:  -No distant metastases on PET/CT; subcarinal mass 5 cm, has shrunk with radiation treatment  ?   -Whether stage IIIB or stage IIIC, treatment remains the same: Definitive concurrent chemoradiation therapy, followed by consolidation immunotherapy with durvalumab  -Completed 14 days of?hypofractionated?palliative radiotherapy 09/30/2021;?did not receive concurrent chemotherapy  -Status post sequential cisplatin/Taxotere?every 3 weeks x4 (10/18/2021-12/22/2021)  -Significant improvement on restaging  CTs?(12/27/2021)  -03/18/2022:?Surveillance CT chest with contrast:?Moderate right pleural effusion, larger than before  >>>  -Refer to IR for diagnostic?right thoracentesis; send fluid?for cytology, cell count, differential, Gram stain, culture, glucose, protein,?albumin, LDH, amylase  -Continue?consolidation immunotherapy with durvalumab?1500 mg?IV every 4 weeks for up to 12 months, pending analysis of?right pleural fluid  -Check baseline TSH;?monitor every couple of months?(in 2 months)  -At this time,?given the new finding of?right?pleural effusion,?will also restage with contrast-enhanced CT scans of?A/P?as well as whole-body nuclear medicine bone scan?to rule out metastasis  ?   Consolidation immunotherapy for patients with unresectable stage II/III NSCLC, PS 0-1, and no disease progression after definitive concurrent chemoradiation  Durvalumab 10 mg/kg IV every 2 weeks or 1500 mg every 4 weeks for up to 12 months (patients with a body weight of 30 kg or > 30 kg) (category 1 for stage III; category 2A for stage II)  This maximizes disease-free survival, 12 month progression free survival, 18 month progression free survival, higher response rate, longer median duration of response, and longer median time to death or distant metastasis.  ?  Initiate surveillance for stage III?non-small cell lung cancer, treated with chemoradiation therapy  -Completed treatment?12/2021  -History and physical and chest CT +/- contrast every 3 months for 3 years?(12/2021-12/2024), then every 6 months for 2 years, then history and physical and low-dose noncontrast enhanced chest CT annually  >>>  -If no recurrent malignancy found on analysis of right pleural effusion, then,?repeat contrast-enhanced chest CT for surveillance in 3 months (mid June)?  ?  -Microcytic anemia  -Anemia of chronic disease/relative iron deficiency  ?  Hypercalcemia of malignancy  -S/p Zometa 4 mg IV x1 (09/12/2021)  ?  Thrombocytosis  -Almost certainly,  reactive (iron deficiency anemia, malignancy)  -JAK2 mutation negative  -BCR-ABL 1 fusion transcripts negative  ?  Hyponatremia secondary to SIADH secondary to malignancy  -Managed by renal; continues to take salt tablets  -Continue fluid restriction, will monitor sodium level  ?  Follow-up visit with me in 3 weeks, after diagnostic paracentesis, CTs A/P and bone scan.  ?  Above discussed with him. ?All questions answered.  Discussed labs and scans and gave him copies?of relevant reports.  Discussed the rationale of?consolidation with durvalumab.  He understands and agrees this plan.  ---------------  ?  ?  Surveillance:  Discussion: Surveillance for stage I-II non-small cell lung cancer (primary treatment included radiotherapy) or stage III or stage IV (oligo metastatic with all sites treated with definitive intent):  ?  As per NCCN guidelines, history and physical and chest CT with or without contrast every 3-6 months for 3 years; then history and physical and chest CT with or without contrast every 6 months for 2 years; then history and physical and a low-dose noncontrast enhanced chest CT annually.? Residual or new radiographic abnormalities may require more frequent imaging.? PET CT scan or brain MRI scan not routinely indicated.? However, many benign conditions like atelectasis/consolidation/radiation fibrosis are difficult to differentiate from neoplasm on standard CT imaging, and PET CT scan can be used to differentiate to malignancy in these settings.? However, if PET CT scan is to be used as a problem solving tool in patients of radiation therapy, then histopathologic confirmation of recurrent disease is needed because it area as previously treated with radiation therapy can remain FDG avid for up to 2 years.  Physical Exam  Vitals & Measurements  T:?36.7? ?C (Oral)? HR:?73(Peripheral)? RR:?20? BP:?140/87?  HT:?165.10?cm? WT:?53.500?kg? BMI:?19.63?   Problem List/Past Medical History  Ongoing  ADD  (attention deficit disorder)  Cachexia  Iron deficiency anemia  Lung mass  Malignant neoplasm of unspecified part of right bronchus or lung  SIADH (syndrome of inappropriate ADH production)  Tobacco user  Historical  No qualifying data  Procedure/Surgical History  Catheter Insertion Mediport (None) (10/11/2021)  Fluoroscopy of Superior Vena Cava using Low Osmolar Contrast, Guidance (10/11/2021)  Insertion of Infusion Device into Superior Vena Cava, Percutaneous Approach (10/11/2021)  Insertion of tunneled centrally inserted central venous access device, with subcutaneous port; age 5 years or older (10/11/2021)  Transfusion of Autologous Red Blood Cells into Peripheral Vein, Percutaneous Approach (09/09/2021)  Bronchoscopy w/ Endobronch Biopsy(s) (08/18/2021)  Bronchoscopy, rigid or flexible, including fluoroscopic guidance, when performed; with bronchial or endobronchial biopsy(s), single or multiple sites (08/18/2021)  Extraction of Shayna, Via Natural or Artificial Opening Endoscopic, Diagnostic (08/18/2021)  Tonsillectomy (1981)   Medications  albuterol 90 mcg/inh inhalation aerosol, 1 puff(s), INH, Once, PRN  atropine-diphenoxylate 0.025 mg-2.5 mg oral tablet, 1 tab(s), Oral, QID  Carafate 1 g oral tablet, 1 gm= 1 tab(s), Oral, QIDACHS, 2 refills,? ?Not taking  famotidine 20 mg oral tablet, 20 mg= 1 tab(s), Oral, BID,? ?Not taking  gabapentin 300 mg oral capsule, 300 mg= 1 cap(s), Oral, TID, 3 refills  megestrol 40 mg/mL oral suspension, 800 mg= 20 mL, Oral, Daily,? ?Not taking  metoclopramide 10 mg oral tablet, 10 mg= 1 tab(s), Oral, QID,? ?Not Taking, Completed Rx  Protonix 40 mg ORAL enteric coated tablet, 40 mg= 1 tab(s), Oral, Daily, 1 refills,? ?Not taking  Allergies  penicillin?(unknown)  Social History  Abuse/Neglect  No, No, Yes, 03/08/2022  Alcohol  Past, 02/02/2022  Employment/School  Unemployed, 08/16/2021  Exercise  Exercise duration: 0., 08/16/2021  Financial/Legal Situation  None,  08/16/2021  Home/Environment  Lives with Spouse., 08/16/2021    Never in , 08/16/2021  Nutrition/Health  Regular, Fair, 08/16/2021  Spiritual/Cultural  [employer], 08/16/2021  Substance Use  Never, 02/02/2022  Tobacco  Former smoker, quit more than 30 days ago, No, 03/08/2022  Family History  Family history is negative  Immunizations  Vaccine Date Status   tetanus/diphtheria/pertussis, acel(Tdap) 07/31/2017 Given

## 2022-05-20 NOTE — HISTORICAL [facility]
This is a historical note converted from Sami. Formatting and pictures may have been removed.  Please reference Sami for original formatting and attached multimedia. Chief Complaint  Carcinoma of Rt. Lung  History of Present Illness  Problem List:  Stage IIIB or stage IIIC Squamous cell carcinoma of right lung diagnosed 8/18/2021  ?   Current Treatment Plan:  Imfinzi (durvalumab)?1500 mg?IV every 4 weeks for up to 12 months  Started?1/25/2022  ?   Cycle #2 due 2/22/2022  ?   Treatment History:  S/p laser tumor debulking at [facility] 08/27/2021  S/p hypofractionated?palliative radiation therapy (09/13/2021-09/30/2021)  Cisplatin/docetaxel (every 3 weeks x4 cycles) (10/18/2021-12/22/2021)  ?  ?   Past medical history: Attention deficit disorder. Tobacco abuse. Tonsillectomy.  Social history:?. ?Lives in [place], [place]. ?Has 2 children.??Has worked as a  all his life?(more than 30-35 years).??Smoked 1-1/2 packs of cigarettes daily for 15 years;?then, 1 pack daily for 1 year;?now, for last 2 months, 2 cigarettes daily.? Heavy alcohol use in the past;?8-10 beers daily?for 10 years;?quit 1-1/2 years ago.? No illicit drugs.  Family history:?No family members with cancer.  Health maintenance:?So far, does not have a primary care physician.? Apparently, he is going to see a primary care physician for the first time next week.  ?   Interval History  2/22/2022: Patient presents today for scheduled follow up for lung cancer. He is due to receive cycle 2 of Imfinzi today. He reports tolerating well, very little symptoms experienced. His biggest complaint is heartburn and some neuropathy symptoms. He is taking OTC medication for heartburn PRN and Gabapentin for neuropathy symptoms. Lab work reviewed with patient, stable for treatment. Medications reviewed, denies any refills at this time.  Review of Systems  A complete 12-point ROS was performed in full with pertinent positives as described in  interval history. Remainder of ROS done in full and are negative.  ?  Physical Exam  Vitals & Measurements  T:?36.7? ?C (Oral)? HR:?92(Peripheral)? RR:?18? BP:?119/82? SpO2:?100%?  HT:?164.00?cm? WT:?54.700?kg? BMI:?20.34?  Physical Exam:  General: Alert and oriented, No acute distress.?  Appearance: Well-groomed wearing mask  HEENT: Normocephalic, Oral mucosa is moist. Pupils are equal, round and reactive to light, Extraocular movements are intact, Normal conjunctiva.?  Neck: Supple, Non-tender, No lymphadenopathy, No thyromegaly.?  Respiratory: Lungs are clear to auscultation, Respirations are non-labored, Breath sounds are equal, Symmetrical chest wall expansion.?  Cardiovascular: Normal rate, Regular rhythm, No edema.?  Breast: Breast exam not performed on todays visit.?  Gastrointestinal: Rounded, Soft, Non-tender, Non-distended, Normal bowel sounds.?  Musculoskeletal: Normal strength. Ambulates without assistance  Integumentary: Warm, dry, intact.?  Neurologic: Alert, Oriented, No focal deficits, Cranial Nerves II-XII are grossly intact.?  Cognition and Speech: Oriented, Speech clear and coherent.?  Psychiatric: Cooperative, Appropriate mood & affect.?  ECOG Performance Scale:?0 - Capable of all self-care No restrictions  Assessment/Plan  1.?Malignant neoplasm of unspecified part of right bronchus or lung?C34.81  Locally advanced, obviously inoperable squamous cell carcinoma of lung;  large, 10 cm mediastinal mass, possibly arising in the right lower lung lobe, causing compressive effects on?right mainstem bronchus and right pulmonary artery;  no metastasis;  s/p bronchoscopic biopsy of carinal?mass 08/18/2021;  large fungating mass at maximiliano, obliterating the right mainstem bronchus and partially occluding left mainstem;  causing hyponatremia, hypercalcemia;  worsening dyspnea, fever, postobstructive pneumonia  ?  #Squamous cell carcinoma of lung:  -Bronchoscopy (08/18/2021). ?CT chest with contrast  (08/06/2021). ?CT A/P without contrast (08/06/2021. ?Brain MRI (09/10/2021). ?Bone scan (09/11/2021).  -10.0 x 6.8 x 7.0 cm subcarinal mass, compressive effect upon right mainstem bronchus and right lower lobe pulmonary artery branches, complete obliteration of right lower lobe bronchi, partial narrowing of the main pulmonary artery  -Right lower lung lobe large masslike consolidation with central cavitations, without clear interface between the right lower lung lobe mass consolidation and the mediastinal mass  -On bronchoscopy, large fungating mass at maximiliano, obliterating the right mainstem bronchus and partially occluding left mainstem  -No metastasis on CTs, bone scan, brain MRI  -Causing postobstructive pneumonia and hospitalization  -S/p laser tumor debulking at [facility] 08/27/2021  -Causing hyponatremia secondary to SIADH and hypercalcemia  -Semiurgently, started on radiation therapy, pending concurrent chemotherapy,?given?complication of right mainstem bronchus, complete obliteration of right lower lobe bronchi, causing postobstructive pneumonia, as well as to prevent?precipitous?respiratory insufficiency?(subsequently, with good?subjective and objective response)  >>  -Tumor >7 cm; tumor invading mediastinum; therefore, cT4  -Mediastinal mass, malignant gosia complex versus extension of right lower lobe mass, therefore, cN2 or cN3  -M0  >>> Stage IIIB or stage IIIC  -Completed 2 weeks of hypofractionated?palliative radiation therapy (09/13/2021-09/30/2021);?exertional dyspnea improved)  (Excellent subjective and objective response to radiotherapy)  (Did not receive concurrent chemotherapy?with RT)  -25 pound weight loss over 1 year, including 15 pound weight loss?in last 3 months; anorexic  -09/20/2021:?ECOG 2; exertional dyspnea has improved with palliative radiation therapy?(completed?2 weeks of hypofractionated?radiotherapy?09/30/2021)  -09/30/2021: PET/CT (comparison: CT C/A/P 08/06/2021): Hypermetabolic  subcarinal mass with FDG avid soft tissue extending to the right hilar region (5 cm subcarinal mass, extends along the right mainstem bronchus and right upper lobe bronchus with the right perihilar region); possible 8 mm FDG avid lymph node anterior to the right mainstem bronchus; no distant sites of FDG avid metastatic disease; multifocal right mid and lower lung consolidation with no hypermetabolic component appreciated; small to moderate right pleural effusion  (PET/CT:?Subcarinal mass 5 cm, was >7 cm to start with, thus, shrunk with radiation treatment; 8 mm?mediastinal lymph node; no distant metastasis)  -BRAF mutation negative  -RET gene rearrangement not detected  -10/11/2021: Left-sided Mediport placed  -Sequential chemotherapy: Cisplatin/docetaxel (every 3 weeks x4 cycles) (10/18/2021-12/22/2021)  -11/17/2021: CT A/P with contrast (epigastric pain): (Comparison: 08/06/2021): Constipation  -12/27/2021: Restaging CT C/A/P with contrast (comparison: 09/30/2021): Significantly improved subcarinal/right hilar soft tissue; improved irregular right perihilar opacities; trace right pleural effusion  ?  ?   #Molecular markers:  -EGFR mutation negative; PD-L1 negative (TPS 0%); KRAS mutation negative;  -ALK gene rearrangement negative; ROS1 gene rearrangement negative; NTRK NGS fusion panel negative  -BRAF mutation negative;?RET gene rearrangement not detected  ?  ?  #Microcytic anemia (relative iron deficiency +/- anemia of chronic disease/malignancy):  -Hemoglobin 6.8-9.3  -MCV 79.7  -MCH, MCHC low  -Stool for occult blood negative x2 (09/2021)  -B12 normal, 755 (09/06/2021)  -Serum iron 9, TIBC 157, transferrin saturation 6%, ferritin 795.5 (09/06/2021)  -Folate low, 6.5 (09/06/2021  -09/30/2021: Serum iron 24, TIBC 220, transferrin saturation 11%, ferritin 714.65 (anemia of chronic disease/relative iron deficiency); B12 level 1046; RBC folate 1147; hemoglobin 10.5  ?  ?  #Hypercalcemia of malignancy:  Calcium  level:  -08/06/2021: 10.3 (albumin 2.5)  -9.6-10.2 between 08/07/2021-09/08/2021  -09/09/2021: 10.5 (albumin 1.8)  -09/12/2021: 10.6 (albumin 2.2)  -09/21/2021: 8.2 (albumin 2.1)  -08/07/2021: Intact PTH level normal, 12.7; intact PTH level <2.0  -09/10/2021: PTH needed peptide <2.0  -Zometa 4 mg IV x1 (09/12/2021)?>> hypercalcemia resolved?  -09/30/2021:?Ionized calcium level 5.1, normal (Serum calcium 9.3, albumin 2.6, corrected calcium 10.42)  ?  ?  #Thrombocytosis:  (Subsequently, spontaneously resolved)  -Platelet count 413-588K  -Almost certainly, secondary to underlying malignancy and relative iron deficiency  -09/30/2021: ELDON-2 mutation negative  -10/08/2021: BCR-ABL1 1 fusion transcripts negative  ?  ?  #Hyponatremia secondary to SIADH:  -Sodium 128-130  -Treated with fluid restriction, salt tablets (by nephrology)  ?  ?   Plan:  -No distant metastases on PET/CT; subcarinal mass 5 cm, has shrunk with radiation treatment  ?   -Whether stage IIIB or stage IIIC, treatment remains the same: Definitive concurrent chemoradiation therapy, followed by consolidation immunotherapy with durvalumab  -Completed 14 days of?hypofractionated?palliative radiotherapy 09/30/2021;?did not receive concurrent chemotherapy  -Patient has already received (palliative)?radiation therapy for stage III squamous cell carcinoma of lung, without concurrent chemotherapy  -Status post sequential cisplatin/Taxotere?every 3 weeks x4 (10/18/2021-12/22/2021)  -Significant improvement on restaging CTs?(12/27/2021)  >>>  -Initiate?consolidation immunotherapy with durvalumab?1500 mg?IV every 4 weeks for up to 12 months  -Check baseline TSH;?monitor every couple of months  ?   Consolidation immunotherapy for patients with unresectable stage II/III NSCLC, PS 0-1, and no disease progression after definitive concurrent chemoradiation  Durvalumab 10 mg/kg IV every 2 weeks or 1500 mg every 4 weeks for up to 12 months (patients with a body weight of 30  kg or > 30 kg) (category 1 for stage III; category 2A for stage II)  This maximizes disease-free survival, 12 month progression free survival, 18 month progression free survival, higher response rate, longer median duration of response, and longer median time to death or distant metastasis.  ?   Initiate surveillance for stage III?non-small cell lung cancer, treated with chemoradiation therapy  -Completed treatment?12/2021  -History and physical and chest CT +/- contrast every 3 months for 3 years?(12/2021-12/2024), then every 6 months for 2 years, then history and physical and low-dose noncontrast enhanced chest CT annually  >>>  -Contrast-enhanced chest CT?in 3 months (March 22)  ?  -Microcytic anemia  -Anemia of chronic disease/relative iron deficiency  ?  Hypercalcemia of malignancy  -S/p Zometa 4 mg IV x1 (09/12/2021)  ?  Thrombocytosis  -Almost certainly, reactive (iron deficiency anemia, malignancy)  -JAK2 mutation negative  -BCR-ABL 1 fusion transcripts negative  ?  Hyponatremia secondary to SIADH secondary to malignancy  -Managed by renal; continues to take salt tablets  -Continue fluid restriction, will monitor sodium level  ?  -09/30/2021:?Malnutrition; anorexia;?35 pound weight loss in 1 year including 15 pound weight loss?in?last 3 months  -We started?Megace 800 mg p.o. daily to boost appetite  -01/14/2022:?Now,?he does not need to take Megace anymore;?appetite has spontaneously normalized?concurrent with?significant improvement?in cancer with chemoradiation therapy  ?  Patient tolerating immunotherapy with no side effects.  Okay to proceed with cycle 2 of Imfinzi today  Follow up in?4 weeks (F2F w/?on 3/22/2022) with CBC, CMP, MG, TSH, Free T4, CT results?prior to cycle #3.  Contrast-enhanced chest CT?in 3 months (March 22); scheduled for 3/18/2022.  Monitor TSH every couple of months  ?  Surveillance:  Discussion: Surveillance for stage I-II non-small cell lung cancer (primary treatment  included radiotherapy) or stage III or stage IV (oligo metastatic with all sites treated with definitive intent):  ?  As per NCCN guidelines, history and physical and chest CT with or without contrast every 3-6 months for 3 years; then history and physical and chest CT with or without contrast every 6 months for 2 years; then history and physical and a low-dose noncontrast enhanced chest CT annually.? Residual or new radiographic abnormalities may require more frequent imaging.? PET CT scan or brain MRI scan not routinely indicated.? However, many benign conditions like atelectasis/consolidation/radiation fibrosis are difficult to differentiate from neoplasm on standard CT imaging, and PET CT scan can be used to differentiate to malignancy in these settings.? However, if PET CT scan is to be used as a problem solving tool in patients of radiation therapy, then histopathologic confirmation of recurrent disease is needed because it area as previously treated with radiation therapy can remain FDG avid for up to 2 years.  Ordered:  ?   Problem List/Past Medical History  Ongoing  ADD (attention deficit disorder)  Cachexia  Iron deficiency anemia  Lung mass  Malignant neoplasm of unspecified part of right bronchus or lung  SIADH (syndrome of inappropriate ADH production)  Tobacco user  Historical  No qualifying data  Procedure/Surgical History  Catheter Insertion Mediport (None) (10/11/2021)  Fluoroscopy of Superior Vena Cava using Low Osmolar Contrast, Guidance (10/11/2021)  Insertion of Infusion Device into Superior Vena Cava, Percutaneous Approach (10/11/2021)  Insertion of tunneled centrally inserted central venous access device, with subcutaneous port; age 5 years or older (10/11/2021)  Transfusion of Autologous Red Blood Cells into Peripheral Vein, Percutaneous Approach (09/09/2021)  Bronchoscopy w/ Endobronch Biopsy(s) (08/18/2021)  Bronchoscopy, rigid or flexible, including fluoroscopic guidance, when performed;  with bronchial or endobronchial biopsy(s), single or multiple sites (08/18/2021)  Extraction of Shayna, Via Natural or Artificial Opening Endoscopic, Diagnostic (08/18/2021)  Tonsillectomy (1981)   Medications  albuterol 90 mcg/inh inhalation aerosol, 1 puff(s), INH, Once, PRN  atropine-diphenoxylate 0.025 mg-2.5 mg oral tablet, 1 tab(s), Oral, QID  Carafate 1 g oral tablet, 1 gm= 1 tab(s), Oral, QIDACHS, 2 refills,? ?Not taking  famotidine 20 mg oral tablet, 20 mg= 1 tab(s), Oral, BID,? ?Not taking  gabapentin 300 mg oral capsule, 300 mg= 1 cap(s), Oral, TID, 3 refills  megestrol 40 mg/mL oral suspension, 800 mg= 20 mL, Oral, Daily,? ?Not taking  Protonix 40 mg ORAL enteric coated tablet, 40 mg= 1 tab(s), Oral, Daily, 1 refills,? ?Not taking  Allergies  penicillin?(unknown)  Social History  Abuse/Neglect  No, No, Yes, 02/22/2022  Alcohol  Past, 02/02/2022  Employment/School  Unemployed, 08/16/2021  Exercise  Exercise duration: 0., 08/16/2021  Financial/Legal Situation  None, 08/16/2021  Home/Environment  Lives with Spouse., 08/16/2021    Never in , 08/16/2021  Nutrition/Health  Regular, Fair, 08/16/2021  Spiritual/Cultural  Sabianism, 08/16/2021  Substance Use  Never, 02/02/2022  Tobacco  Former smoker, quit more than 30 days ago, No, 02/22/2022  Immunizations  Vaccine Date Status   tetanus/diphtheria/pertussis, acel(Tdap) 07/31/2017 Given   Health Maintenance  Health Maintenance  ???Pending?(in the next year)  ??? ??OverDue  ??? ? ? ?Influenza Vaccine due??10/01/21??and every 1??day(s)  ??? ? ? ?Smoking Cessation due??01/01/22??and every 1??year(s)  ??? ??Due?  ??? ? ? ?Alcohol Misuse Screening due??01/02/22??and every 1??year(s)  ??? ? ? ?Lipid Screening due??02/22/22??Unknown Frequency  ??? ??Due In Future?  ??? ? ? ?ADL Screening not due until??08/16/22??and every 1??year(s)  ??? ? ? ?Aspirin Therapy for CVD Prevention not due until??10/05/22??and every 1??year(s)  ??? ? ? ?Obesity Screening not  due until??01/01/23??and every 1??year(s)  ???Satisfied?(in the past 1 year)  ??? ??Satisfied?  ??? ? ? ?ADL Screening on??08/16/21.??Satisfied by Marcia Nobles  ??? ? ? ?Alcohol Misuse Screening on??10/05/21.??Satisfied by Moira Rojas  ??? ? ? ?Aspirin Therapy for CVD Prevention on??10/05/21.??Satisfied by Moira Rojas  ??? ? ? ?Blood Pressure Screening on??02/22/22.??Satisfied by LÁZARO Ashley, Janki  ??? ? ? ?Body Mass Index Check on??02/22/22.??Satisfied by LÁZARO Ashley Ericka  ??? ? ? ?Depression Screening on??02/22/22.??Satisfied by LÁZARO Ashley, Janki  ??? ? ? ?Diabetes Screening on??02/22/22.??Satisfied by Augustina Pascual MT  ??? ? ? ?Hypertension Management-Blood Pressure on??02/22/22.??Satisfied by LÁZARO sAhley, Janki  ??? ? ? ?Influenza Vaccine on??02/22/22.??Satisfied by LÁZARO Ashley Ericka  ??? ? ? ?Obesity Screening on??02/22/22.??Satisfied by LÁZARO Ashley, Janki  ??? ? ? ?Smoking Cessation on??09/08/21.??Satisfied by VIRAJ Bolton, Tracie  ?  Lab Results  Test Name Test Result Date/Time   Sodium Lvl 138 mmol/L 02/22/2022 07:41 CST   Potassium Lvl 4.4 mmol/L 02/22/2022 07:41 CST   Chloride 104 mmol/L 02/22/2022 07:41 CST   CO2 28 mmol/L 02/22/2022 07:41 CST   Calcium Lvl 9.6 mg/dL 02/22/2022 07:41 CST   Glucose Lvl 80 mg/dL 02/22/2022 07:41 CST   BUN 13.2 mg/dL 02/22/2022 07:41 CST   Creatinine 0.80 mg/dL 02/22/2022 07:41 CST   Est Creat Clearance Ser 96.64 mL/min 02/22/2022 08:45 CST   eGFR-AA >105 02/22/2022 07:41 CST   eGFR-BANDAR >105 mL/min/1.73 m2 02/22/2022 07:41 CST   Bili Total 0.3 mg/dL 02/22/2022 07:41 CST   Bili Direct 0.1 mg/dL 02/22/2022 07:41 CST   Bili Indirect 0.20 mg/dL 02/22/2022 07:41 CST   AST 11 unit/L 02/22/2022 07:41 CST   ALT 5 unit/L 02/22/2022 07:41 CST   Alk Phos 44 unit/L 02/22/2022 07:41 CST   Total Protein 7.3 gm/dL 02/22/2022 07:41 CST   Albumin Lvl 3.4 gm/dL (Low) 02/22/2022 07:41 CST   Globulin 3.9 gm/dL (High) 02/22/2022 07:41 CST   A/G Ratio 0.9 ratio (Low)  02/22/2022 07:41 CST   WBC 6.5 x10(3)/mcL 02/22/2022 07:41 CST   RBC 3.79 x10(6)/mcL (Low) 02/22/2022 07:41 CST   Hgb 11.6 gm/dL (Low) 02/22/2022 07:41 CST   Hct 35.4 % (Low) 02/22/2022 07:41 CST   Platelet 208 x10(3)/mcL 02/22/2022 07:41 CST   MCV 93.4 fL 02/22/2022 07:41 CST   MCH 30.6 pg 02/22/2022 07:41 CST   MCHC 32.8 gm/dL 02/22/2022 07:41 CST   RDW 13.8 % 02/22/2022 07:41 CST   MPV 8.8 fL 02/22/2022 07:41 CST   Abs Neut 4.54 x10(3)/mcL 02/22/2022 07:41 CST   Neutro Auto 70 % 02/22/2022 07:41 CST   Lymph Auto 13 % 02/22/2022 07:41 CST   Mono Auto 12 % 02/22/2022 07:41 CST   Eos Auto 4 % 02/22/2022 07:41 CST   Abs Eos 0.3 x10(3)/mcL 02/22/2022 07:41 CST   Basophil Auto 1 % 02/22/2022 07:41 CST   Abs Neutro 4.54 x10(3)/mcL 02/22/2022 07:41 CST   Abs Lymph 0.9 x10(3)/mcL 02/22/2022 07:41 CST   Abs Mono 0.8 x10(3)/mcL 02/22/2022 07:41 CST   Abs Baso 0.0 x10(3)/mcL 02/22/2022 07:41 CST   NRBC% 0.0 % 02/22/2022 07:41 CST   IG% 0 % 02/22/2022 07:41 CST   IG# 0.020 02/22/2022 07:41 CST

## 2022-06-02 ENCOUNTER — OFFICE VISIT (OUTPATIENT)
Dept: URGENT CARE | Facility: CLINIC | Age: 47
End: 2022-06-02
Payer: MEDICAID

## 2022-06-02 ENCOUNTER — HOSPITAL ENCOUNTER (OUTPATIENT)
Dept: RADIOLOGY | Facility: HOSPITAL | Age: 47
Discharge: HOME | End: 2022-06-02
Attending: NURSE PRACTITIONER
Payer: MEDICAID

## 2022-06-02 VITALS
RESPIRATION RATE: 18 BRPM | HEART RATE: 76 BPM | DIASTOLIC BLOOD PRESSURE: 93 MMHG | TEMPERATURE: 98 F | WEIGHT: 118.19 LBS | OXYGEN SATURATION: 97 % | SYSTOLIC BLOOD PRESSURE: 152 MMHG | HEIGHT: 65 IN | BODY MASS INDEX: 19.69 KG/M2

## 2022-06-02 DIAGNOSIS — S82.832A CLOSED FRACTURE OF DISTAL END OF LEFT FIBULA, UNSPECIFIED FRACTURE MORPHOLOGY, INITIAL ENCOUNTER: ICD-10-CM

## 2022-06-02 DIAGNOSIS — M25.572 ACUTE LEFT ANKLE PAIN: Primary | ICD-10-CM

## 2022-06-02 PROCEDURE — 99214 OFFICE O/P EST MOD 30 MIN: CPT | Mod: S$PBB,,, | Performed by: NURSE PRACTITIONER

## 2022-06-02 PROCEDURE — 73610 X-RAY EXAM OF ANKLE: CPT | Mod: 26,LT,, | Performed by: RADIOLOGY

## 2022-06-02 PROCEDURE — 73610 X-RAY EXAM OF ANKLE: CPT | Mod: TC,LT

## 2022-06-02 PROCEDURE — 73610 XR ANKLE COMPLETE 3 VIEW LEFT: ICD-10-PCS | Mod: 26,LT,, | Performed by: RADIOLOGY

## 2022-06-02 PROCEDURE — 99214 PR OFFICE/OUTPT VISIT, EST, LEVL IV, 30-39 MIN: ICD-10-PCS | Mod: S$PBB,,, | Performed by: NURSE PRACTITIONER

## 2022-06-02 PROCEDURE — 99215 OFFICE O/P EST HI 40 MIN: CPT | Mod: PBBFAC | Performed by: NURSE PRACTITIONER

## 2022-06-02 RX ORDER — IBUPROFEN 800 MG/1
800 TABLET ORAL
Qty: 30 TABLET | Refills: 0 | Status: SHIPPED | OUTPATIENT
Start: 2022-06-02 | End: 2022-06-02

## 2022-06-02 RX ORDER — IBUPROFEN 800 MG/1
800 TABLET ORAL EVERY 6 HOURS PRN
Qty: 30 TABLET | Refills: 0 | Status: SHIPPED | OUTPATIENT
Start: 2022-06-02 | End: 2022-06-12

## 2022-06-02 NOTE — LETTER
June 2, 2022      [facility]  [address] [address]  Phone: [phone]       Patient: Deepak Pratt   YOB: 1975  Date of Visit: 06/02/2022    To Whom It May Concern:    Alexia Pratt  was at [facility] on 06/02/2022. The patient may return to work/school on 06/03/2022 with no restrictions. If you have any questions or concerns, or if I can be of further assistance, please do not hesitate to contact me.    Sincerely,    Alberta Lombardi LPN

## 2022-06-13 ENCOUNTER — OFFICE VISIT (OUTPATIENT)
Dept: HEMATOLOGY/ONCOLOGY | Facility: CLINIC | Age: 47
End: 2022-06-13
Payer: MEDICAID

## 2022-06-13 VITALS
DIASTOLIC BLOOD PRESSURE: 83 MMHG | SYSTOLIC BLOOD PRESSURE: 141 MMHG | HEIGHT: 65 IN | WEIGHT: 121.5 LBS | HEART RATE: 71 BPM | BODY MASS INDEX: 20.24 KG/M2 | TEMPERATURE: 98 F | OXYGEN SATURATION: 99 %

## 2022-06-13 DIAGNOSIS — C34.90 SQUAMOUS CELL CARCINOMA OF LUNG, UNSPECIFIED LATERALITY: Primary | ICD-10-CM

## 2022-06-13 PROCEDURE — 1160F RVW MEDS BY RX/DR IN RCRD: CPT | Mod: CPTII,,, | Performed by: NURSE PRACTITIONER

## 2022-06-13 PROCEDURE — 3008F BODY MASS INDEX DOCD: CPT | Mod: CPTII,,, | Performed by: NURSE PRACTITIONER

## 2022-06-13 PROCEDURE — 84443 ASSAY THYROID STIM HORMONE: CPT | Performed by: INTERNAL MEDICINE

## 2022-06-13 PROCEDURE — 99214 OFFICE O/P EST MOD 30 MIN: CPT | Mod: PBBFAC | Performed by: NURSE PRACTITIONER

## 2022-06-13 PROCEDURE — 84439 ASSAY OF FREE THYROXINE: CPT | Performed by: INTERNAL MEDICINE

## 2022-06-13 PROCEDURE — 80053 COMPREHEN METABOLIC PANEL: CPT | Performed by: INTERNAL MEDICINE

## 2022-06-13 PROCEDURE — 1159F MED LIST DOCD IN RCRD: CPT | Mod: CPTII,,, | Performed by: NURSE PRACTITIONER

## 2022-06-13 PROCEDURE — 3079F DIAST BP 80-89 MM HG: CPT | Mod: CPTII,,, | Performed by: NURSE PRACTITIONER

## 2022-06-13 PROCEDURE — 3008F PR BODY MASS INDEX (BMI) DOCUMENTED: ICD-10-PCS | Mod: CPTII,,, | Performed by: NURSE PRACTITIONER

## 2022-06-13 PROCEDURE — 99214 PR OFFICE/OUTPT VISIT, EST, LEVL IV, 30-39 MIN: ICD-10-PCS | Mod: S$PBB,,, | Performed by: NURSE PRACTITIONER

## 2022-06-13 PROCEDURE — 1159F PR MEDICATION LIST DOCUMENTED IN MEDICAL RECORD: ICD-10-PCS | Mod: CPTII,,, | Performed by: NURSE PRACTITIONER

## 2022-06-13 PROCEDURE — 83735 ASSAY OF MAGNESIUM: CPT | Performed by: INTERNAL MEDICINE

## 2022-06-13 PROCEDURE — 3077F PR MOST RECENT SYSTOLIC BLOOD PRESSURE >= 140 MM HG: ICD-10-PCS | Mod: CPTII,,, | Performed by: NURSE PRACTITIONER

## 2022-06-13 PROCEDURE — 1160F PR REVIEW ALL MEDS BY PRESCRIBER/CLIN PHARMACIST DOCUMENTED: ICD-10-PCS | Mod: CPTII,,, | Performed by: NURSE PRACTITIONER

## 2022-06-13 PROCEDURE — 3077F SYST BP >= 140 MM HG: CPT | Mod: CPTII,,, | Performed by: NURSE PRACTITIONER

## 2022-06-13 PROCEDURE — 3079F PR MOST RECENT DIASTOLIC BLOOD PRESSURE 80-89 MM HG: ICD-10-PCS | Mod: CPTII,,, | Performed by: NURSE PRACTITIONER

## 2022-06-13 PROCEDURE — 85025 COMPLETE CBC W/AUTO DIFF WBC: CPT | Performed by: INTERNAL MEDICINE

## 2022-06-13 PROCEDURE — 99214 OFFICE O/P EST MOD 30 MIN: CPT | Mod: S$PBB,,, | Performed by: NURSE PRACTITIONER

## 2022-06-13 NOTE — Clinical Note
Follow up with  in 1 month on 7/11/2022 with lab work and ct chest results prior to Imfinzi cycle 7 infusion on 7/12/2022

## 2022-06-13 NOTE — PROGRESS NOTES
Problem List:   -Squamous cell carcinoma of lung, stage III   -Microcytic anemia (relative iron deficiency)   -Hypercalcemia of malignancy   -Thrombocytosis   -Hyponatremia (SIADH)    Current Treatment:  durvalumab 1500 mg every 4 weeks for up to 12 months    started 01/25/2022      Treatment History:  -bronchoscopy 08/18/2021  -S/P laser tumor debulking at [facility] 08/27/2021  -S/P palliative radiotherapy 09/2021,  -Followed by sequential chemotherapy with cisplatin/docetaxel q. 3 weeks x4 cycles (10/2021-12/2021)    Past medical history: Attention deficit disorder. Tobacco abuse. Tonsillectomy.  Social history: .  Lives in [place], [place].  Has 2 children.  Has worked as a  all his life (more than 30-35 years).  Smoked 1-1/2 packs of cigarettes daily for 15 years; then, 1 pack daily for 1 year; now, for last 2 months, 2 cigarettes daily.  Heavy alcohol use in the past; 8-10 beers daily for 10 years; quit 1-1/2 years ago.  No illicit drugs.  Family history: No family members with cancer.  Health maintenance: So far, does not have a primary care physician.  Apparently, he is going to see a primary care physician for the first time next week.    History of Present Illness:   46-year-old , smoker, presented to urgent care 08/06/2021 with complaint of shortness of breath, weakness, and 20 pound unintentional weight loss over last 1 year. Productive cough. White sputum. Night sweats and fever. Was referred to ED at [facility] for further work-up. Noted to have hypercalcemia, hyponatremia, and neutrophilic leukocytosis. Work-up showed large heterogeneous mediastinal mass and right lower lobe mass.  Squamous cell carcinoma of lung, Bronchoscopy (08/18/2021).      Interval History 6/13/2022: Patient presents along with his wife for scheduled follow up for non small cell lung cancer. She is due to receive cycle 6 of Imfinzi tomorrow. He reports doing well without any complaints. He  recently fractured his foot. He was working on his truck and slipped and fell down. He is now wearing a boot on left foot. He is awaiting referral to Ortho for follow up. Lab work reviewed with patient, stable for treatment tomorrow. He reports numbness and tingling but no worse than usual. He is aware of upcoming CT scan of chest. Medications reviewed, denies the need for any refills. He dissent have any further questions needs or concerns.     Review of Systems: A complete 12-point ROS was performed in full with pertinent positives as described in interval history. Remainder of ROS done in full and are negative.      Physical Exam    Vitals & Measurements  Vitals:    06/13/22 0846   BP: (!) 141/83   Pulse: 71   Temp: 97.9 °F (36.6 °C)       General: Alert and oriented. NAD  Eye: Pupils are equal, round and reactive to light, Extraocular movements are intact. Normal conjunctiva  HENT: Normocephalic. Conjunctivae and EOM are normal. Pupils are equal, round, neck supple nontenter  Respiratory: Respirations are non-labored, Symmetrical chest wall expansion. Breath sounds CTA bilaterally  Cardiovascular: Regular rate, rhythm, Normal peripheral perfusion, No bilateral lower extremity edema  Gastrointestinal: Non-distended, Present bowel sounds   Genitourinary: Exam deferred  Lymphatics: No lymphadenopathy appreciated  Musculoskeletal: Moves all extremities  Integumentary: Intact. Warm, dry. No rashes, or lesions to visible skin  Neurologic: No focal deficits  Psychiatric: Cooperative. Appropriate mood and affect   ECOG Performance Scale: 1 - Capable of all self-care able to carry out light work activities.    Assessment:  To summarize:  -bronchoscopy 08/18/2021  -10 cm subcarinal mass with compressive effects, right lower lung lobe large masslike consolidation with central cavitation, large fungating mass at maximiliano, obliterating the right mainstem bronchus and partially occluding left mainstem  -stage IIIB or  IIIC  -postobstructive pneumonia and hospitalization  -S/P laser tumor debulking at [facility] 08/27/2021  -hyponatremia secondary to SIADH  -hypercalcemia  -S/P palliative radiotherapy 09/2021, pending definitive concurrent chemoradiation therapy, with subjective and objective improvement  -did not receive concurrent chemotherapy with radiotherapy  -Followed by sequential chemotherapy with cisplatin/docetaxel q. 3 weeks x4 cycles (10/2021-12/2021)  -with considerable improvement  -consolidation durvalumab started 01/25/2022) q.4 weeks)        #Molecular markers:   -EGFR mutation negative; PD-L1 negative (TPS 0%); KRAS mutation negative;   -ALK gene rearrangement negative; ROS1 gene rearrangement negative; NTRK NGS fusion panel negative   -BRAF mutation negative; RET gene rearrangement not detected          #Microcytic anemia (relative iron deficiency +/- anemia of chronic disease/malignancy):   -Hemoglobin 6.8-9.3   -MCV 79.7   -MCH, MCHC low   -Stool for occult blood negative x2 (09/2021)   -B12 normal, 755 (09/06/2021)   -Serum iron 9, TIBC 157, transferrin saturation 6%, ferritin 795.5 (09/06/2021)   -Folate low, 6.5 (09/06/2021   -09/30/2021: Serum iron 24, TIBC 220, transferrin saturation 11%, ferritin 714.65 (anemia of chronic disease/relative iron deficiency); B12 level 1046; RBC folate 1147; hemoglobin 10.5          #Hypercalcemia of malignancy:   Calcium level:   -08/06/2021: 10.3 (albumin 2.5)   -9.6-10.2 between 08/07/2021-09/08/2021   -09/09/2021: 10.5 (albumin 1.8)   -09/12/2021: 10.6 (albumin 2.2)   -09/21/2021: 8.2 (albumin 2.1)   -08/07/2021: Intact PTH level normal, 12.7; intact PTH level <2.0   -09/10/2021: PTH needed peptide <2.0   -Zometa 4 mg IV x1 (09/12/2021) >> hypercalcemia resolved    -09/30/2021: Ionized calcium level 5.1, normal (Serum calcium 9.3, albumin 2.6, corrected calcium 10.42)          #Thrombocytosis:   (Subsequently, spontaneously resolved)   -Platelet count 413-588K   -Almost  certainly, secondary to underlying malignancy and relative iron deficiency   -09/30/2021: ELDON-2 mutation negative   -10/08/2021: BCR-ABL1 1 fusion transcripts negative          #Hyponatremia secondary to SIADH:   -Sodium 128-130   -Treated with fluid restriction, salt tablets (by nephrology)      Plan:    -03/18/2022: Surveillance CT chest with contrast: Moderate right pleural effusion, larger than before  -04/22/2022:  Right thoracentesis, 40 cc, clear, albumin 3.3, glucose 103, , protein 5.4 (exudative) (apparently, cytology was not sent)  -Will follow radiologically   >>>  -Continue consolidation immunotherapy with durvalumab 1500 mg IV every 4 weeks for up to 12 months, pending analysis of right pleural fluid   -Check TSH level every 2 months (next, around 08/13/2022)      Consolidation immunotherapy for patients with unresectable stage II/III NSCLC, PS 0-1, and no disease progression after definitive concurrent chemoradiation   Durvalumab 10 mg/kg IV every 2 weeks or 1500 mg every 4 weeks for up to 12 months (patients with a body weight of 30 kg or > 30 kg) (category 1 for stage III; category 2A for stage II)   This maximizes disease-free survival, 12 month progression free survival, 18 month progression free survival, higher response rate, longer median duration of response, and longer median time to death or distant metastasis.      Initiate surveillance for stage III non-small cell lung cancer, treated with chemoradiation therapy   -Completed treatment 12/2021   -History and physical and chest CT +/- contrast every 3 months for 3 years (12/2021-12/2024), then every 6 months for 2 years, then history and physical and low-dose noncontrast enhanced chest CT annually   >>>  -repeat noncontrast chest CT for surveillance in 3 months (mid June)-Scheduled for 6/16/2022     Hyponatremia secondary to SIADH secondary to malignancy   -Managed by renal; continues to take salt tablets   -Continue fluid restriction,  will monitor sodium level     Follow-up with  in 1 month on 7/11/2022, with CBC and CMP, Mg prior to cycle 7 of Imfinzi on 7/12/2022      Above discussed with him.  All questions answered.  He understands and agrees with this plan.   ---------------        Surveillance:   Discussion: Surveillance for stage I-II non-small cell lung cancer (primary treatment included radiotherapy) or stage III or stage IV (oligo metastatic with all sites treated with definitive intent):      As per NCCN guidelines, history and physical and chest CT with or without contrast every 3-6 months for 3 years; then history and physical and chest CT with or without contrast every 6 months for 2 years; then history and physical and a low-dose noncontrast enhanced chest CT annually.  Residual or new radiographic abnormalities may require more frequent imaging.  PET CT scan or brain MRI scan not routinely indicated.  However, many benign conditions like atelectasis/consolidation/radiation fibrosis are difficult to differentiate from neoplasm on standard CT imaging, and PET CT scan can be used to differentiate to malignancy in these settings.  However, if PET CT scan is to be used as a problem solving tool in patients of radiation therapy, then histopathologic confirmation of recurrent disease is needed because it area as previously treated with radiation therapy can remain FDG avid for up to 2 years.

## 2022-06-14 ENCOUNTER — INFUSION (OUTPATIENT)
Dept: INFUSION THERAPY | Facility: HOSPITAL | Age: 47
End: 2022-06-14
Attending: INTERNAL MEDICINE
Payer: MEDICAID

## 2022-06-14 VITALS — SYSTOLIC BLOOD PRESSURE: 121 MMHG | DIASTOLIC BLOOD PRESSURE: 73 MMHG | HEART RATE: 68 BPM | TEMPERATURE: 98 F

## 2022-06-14 DIAGNOSIS — C34.90 SQUAMOUS CELL CARCINOMA OF LUNG, UNSPECIFIED LATERALITY: Primary | ICD-10-CM

## 2022-06-14 PROCEDURE — 25000003 PHARM REV CODE 250: Performed by: INTERNAL MEDICINE

## 2022-06-14 PROCEDURE — 63600175 PHARM REV CODE 636 W HCPCS: Mod: JG | Performed by: INTERNAL MEDICINE

## 2022-06-14 PROCEDURE — 96413 CHEMO IV INFUSION 1 HR: CPT

## 2022-06-14 RX ORDER — EPINEPHRINE 0.3 MG/.3ML
0.3 INJECTION SUBCUTANEOUS ONCE AS NEEDED
Status: DISCONTINUED | OUTPATIENT
Start: 2022-06-14 | End: 2022-06-14 | Stop reason: HOSPADM

## 2022-06-14 RX ORDER — DIPHENHYDRAMINE HYDROCHLORIDE 50 MG/ML
50 INJECTION INTRAMUSCULAR; INTRAVENOUS ONCE AS NEEDED
Status: DISCONTINUED | OUTPATIENT
Start: 2022-06-14 | End: 2022-06-14 | Stop reason: HOSPADM

## 2022-06-14 RX ORDER — SODIUM CHLORIDE 0.9 % (FLUSH) 0.9 %
10 SYRINGE (ML) INJECTION
Status: DISCONTINUED | OUTPATIENT
Start: 2022-06-14 | End: 2022-06-14 | Stop reason: HOSPADM

## 2022-06-14 RX ORDER — HEPARIN 100 UNIT/ML
500 SYRINGE INTRAVENOUS
Status: DISCONTINUED | OUTPATIENT
Start: 2022-06-14 | End: 2022-06-14 | Stop reason: HOSPADM

## 2022-06-14 RX ADMIN — DURVALUMAB 1500 MG: 500 INJECTION, SOLUTION INTRAVENOUS at 11:06

## 2022-06-16 ENCOUNTER — HOSPITAL ENCOUNTER (OUTPATIENT)
Dept: RADIOLOGY | Facility: HOSPITAL | Age: 47
Discharge: HOME | End: 2022-06-16
Attending: INTERNAL MEDICINE
Payer: MEDICAID

## 2022-06-16 DIAGNOSIS — C34.90 SQUAMOUS CELL CARCINOMA OF LUNG, UNSPECIFIED LATERALITY: ICD-10-CM

## 2022-06-16 PROCEDURE — 71250 CT THORAX DX C-: CPT | Mod: TC

## 2022-06-24 NOTE — PROGRESS NOTES
"Subjective:       Patient ID: Deepak Pratt is a 47 y.o. male.    Vitals:  height is 5' 5" (1.651 m) and weight is 53.6 kg (118 lb 3.2 oz). His oral temperature is 98.3 °F (36.8 °C). His blood pressure is 152/93 (abnormal) and his pulse is 76. His respiration is 18 and oxygen saturation is 97%.     Chief Complaint: Ankle Pain (Left,pt reports he slipped and fell while working on his truck)    Patient is a 47-year-old male, here today for left ankle pain x1 day., inverting his left foot.  States ankle pain is 7/10, not taking anything for pain.      Constitution: Negative.   Neck: neck negative.   Cardiovascular: Negative.    Respiratory: Negative.    Musculoskeletal: Positive for pain.   Neurological: Negative.        Objective:      Physical Exam   Constitutional: He is oriented to person, place, and time. He appears well-developed.   HENT:   Head: Normocephalic.   Eyes: Conjunctivae and EOM are normal. Pupils are equal, round, and reactive to light.   Neck: Neck supple.   Cardiovascular: Normal rate, regular rhythm and normal heart sounds.   Pulmonary/Chest: Effort normal and breath sounds normal.   Musculoskeletal: Normal range of motion.         General: Tenderness present. Normal range of motion.        Legs:    Neurological: He is alert and oriented to person, place, and time.   Skin: Skin is warm and dry.   Psychiatric: His behavior is normal.   Vitals reviewed.        Assessment:       1. Acute left ankle pain    2. Closed fracture of distal end of left fibula, unspecified fracture morphology, initial encounter            Narrative & Impression  EXAMINATION:  XR ANKLE COMPLETE 3 VIEW LEFT     CLINICAL HISTORY:  Pain in left ankle and joints of left foot     COMPARISON:  None.     FINDINGS:  Minimally displaced fracture of the distal fibula/lateral malleolus with associated swelling there is a small fragment at the very tip of the medial malleolus small avulsion cannot be completely excluded no other fractures " or dislocations identified     Joint spaces preserved.     No blastic or lytic lesions.     Soft tissue swelling     Impression:     Fractures as above with associated soft tissue swelling.        Electronically signed by: Nahun Vail  Date:                                            06/02/2022  Time:                                           11:38  No results found.   Plan:         Findings discussed with patient, will refer to Ortho for follow-up.  Walking boot placed, patient states he has crutches at home and that he will use.  Nonweightbearing LLE until Ortho follow-up appointment.  Patient instructed to contact our clinic if he has not been contacted to set up Ortho appointment within 1 week.  ER precautions for any new symptoms.    Acute left ankle pain  -     XR ANKLE COMPLETE 3 VIEW LEFT  -     Discontinue: ibuprofen (ADVIL,MOTRIN) 800 MG tablet; Take 1 tablet (800 mg total) by mouth 3 (three) times daily with meals. for 10 days  Dispense: 30 tablet; Refill: 0  -     ibuprofen (ADVIL,MOTRIN) 800 MG tablet; Take 1 tablet (800 mg total) by mouth every 6 (six) hours as needed for Pain.  Dispense: 30 tablet; Refill: 0    Closed fracture of distal end of left fibula, unspecified fracture morphology, initial encounter  -     Discontinue: ibuprofen (ADVIL,MOTRIN) 800 MG tablet; Take 1 tablet (800 mg total) by mouth 3 (three) times daily with meals. for 10 days  Dispense: 30 tablet; Refill: 0  -     Ambulatory referral/consult to Orthopedics  -     ibuprofen (ADVIL,MOTRIN) 800 MG tablet; Take 1 tablet (800 mg total) by mouth every 6 (six) hours as needed for Pain.  Dispense: 30 tablet; Refill: 0  -     NON-PNEUMATIC WALKING BOOT FOR HOME USE                    No

## 2022-07-09 PROBLEM — J90 PLEURAL EFFUSION ON RIGHT: Status: ACTIVE | Noted: 2022-07-09

## 2022-07-09 RX ORDER — EPINEPHRINE 0.3 MG/.3ML
0.3 INJECTION SUBCUTANEOUS ONCE AS NEEDED
Status: CANCELLED | OUTPATIENT
Start: 2022-07-12

## 2022-07-09 RX ORDER — EPINEPHRINE 0.3 MG/.3ML
0.3 INJECTION SUBCUTANEOUS ONCE AS NEEDED
Status: CANCELLED | OUTPATIENT
Start: 2022-08-12

## 2022-07-09 RX ORDER — SODIUM CHLORIDE 0.9 % (FLUSH) 0.9 %
10 SYRINGE (ML) INJECTION
Status: CANCELLED | OUTPATIENT
Start: 2022-08-12

## 2022-07-09 RX ORDER — HEPARIN 100 UNIT/ML
500 SYRINGE INTRAVENOUS
Status: CANCELLED | OUTPATIENT
Start: 2022-07-12

## 2022-07-09 RX ORDER — DIPHENHYDRAMINE HYDROCHLORIDE 50 MG/ML
50 INJECTION INTRAMUSCULAR; INTRAVENOUS ONCE AS NEEDED
Status: CANCELLED | OUTPATIENT
Start: 2022-08-12

## 2022-07-09 RX ORDER — SODIUM CHLORIDE 0.9 % (FLUSH) 0.9 %
10 SYRINGE (ML) INJECTION
Status: CANCELLED | OUTPATIENT
Start: 2022-07-12

## 2022-07-09 RX ORDER — DIPHENHYDRAMINE HYDROCHLORIDE 50 MG/ML
50 INJECTION INTRAMUSCULAR; INTRAVENOUS ONCE AS NEEDED
Status: CANCELLED | OUTPATIENT
Start: 2022-07-12

## 2022-07-09 RX ORDER — HEPARIN 100 UNIT/ML
500 SYRINGE INTRAVENOUS
Status: CANCELLED | OUTPATIENT
Start: 2022-08-12

## 2022-07-09 NOTE — PROGRESS NOTES
Past medical history: Attention deficit disorder. Tobacco abuse. Tonsillectomy.  Social history: .  Lives in [place], [place].  Has 2 children.  Has worked as a  all his life (more than 30-35 years).  Smoked 1-1/2 packs of cigarettes daily for 15 years; then, 1 pack daily for 1 year; now, for last 2 months, 2 cigarettes daily.  Heavy alcohol use in the past; 8-10 beers daily for 10 years; quit 1-1/2 years ago.  No illicit drugs.  Family history: No family members with cancer.  Health maintenance: So far, does not have a primary care physician.  Apparently, he is going to see a primary care physician for the first time next week.      Reason for follow-up:   -Squamous cell carcinoma of lung, stage III   -Microcytic anemia (relative iron deficiency)   -Hypercalcemia of malignancy   -Thrombocytosis   -Hyponatremia (SIADH)         History of present illness:   46-year-old , smoker, presented to urgent care 08/06/2021 with complaint of shortness of breath, weakness, and 20 pound unintentional weight loss over last 1 year. Productive cough. White sputum. Night sweats and fever. Was referred to ED at [facility] for further work-up. Noted to have hypercalcemia, hyponatremia, and neutrophilic leukocytosis. Work-up showed large heterogeneous mediastinal mass and right lower lobe mass.       #Squamous cell carcinoma of lung:   -Bronchoscopy (08/18/2021).  CT chest with contrast (08/06/2021).  CT A/P without contrast (08/06/2021.  Brain MRI (09/10/2021).  Bone scan (09/11/2021).   -10.0 x 6.8 x 7.0 cm subcarinal mass, compressive effect upon right mainstem bronchus and right lower lobe pulmonary artery branches, complete obliteration of right lower lobe bronchi, partial narrowing of the main pulmonary artery   -Right lower lung lobe large masslike consolidation with central cavitations, without clear interface between the right lower lung lobe mass consolidation and the mediastinal  mass   -On bronchoscopy, large fungating mass at maximiliano, obliterating the right mainstem bronchus and partially occluding left mainstem   -No metastasis on CTs, bone scan, brain MRI   -Causing postobstructive pneumonia and hospitalization   -S/p laser tumor debulking at [facility] 08/27/2021   -Causing hyponatremia secondary to SIADH and hypercalcemia   -Semiurgently, started on radiation therapy, pending concurrent chemotherapy, given complication of right mainstem bronchus, complete obliteration of right lower lobe bronchi, causing postobstructive pneumonia, as well as to prevent precipitous respiratory insufficiency (subsequently, with good subjective and objective response)   >>   -Tumor >7 cm; tumor invading mediastinum; therefore, cT4   -Mediastinal mass, malignant gosia complex versus extension of right lower lobe mass, therefore, cN2 or cN3   -M0   >>> Stage IIIB or stage IIIC   -Completed 2 weeks of hypofractionated palliative radiation therapy (09/13/2021-09/30/2021); exertional dyspnea improved)   (Excellent subjective and objective response to radiotherapy)   (Did not receive concurrent chemotherapy with RT)   -25 pound weight loss over 1 year, including 15 pound weight loss in last 3 months; anorexic   -09/20/2021: ECOG 2; exertional dyspnea has improved with palliative radiation therapy (completed 2 weeks of hypofractionated radiotherapy 09/30/2021)   -09/30/2021: PET/CT (comparison: CT C/A/P 08/06/2021): Hypermetabolic subcarinal mass with FDG avid soft tissue extending to the right hilar region (5 cm subcarinal mass, extends along the right mainstem bronchus and right upper lobe bronchus with the right perihilar region); possible 8 mm FDG avid lymph node anterior to the right mainstem bronchus; no distant sites of FDG avid metastatic disease; multifocal right mid and lower lung consolidation with no hypermetabolic component appreciated; small to moderate right pleural effusion  (PET/CT: Subcarinal mass 5  cm, was >7 cm to start with, thus, shrunk with radiation treatment; 8 mm mediastinal lymph node; no distant metastasis)   -BRAF mutation negative   -RET gene rearrangement not detected   -10/11/2021: Left-sided Mediport placed   -Sequential chemotherapy: Cisplatin/docetaxel (every 3 weeks x4 cycles) (10/18/2021-12/22/2021)   -11/17/2021: CT A/P with contrast (epigastric pain): (Comparison: 08/06/2021): Constipation   -12/27/2021: Restaging CT C/A/P with contrast (comparison: 09/30/2021): Significantly improved subcarinal/right hilar soft tissue; improved irregular right perihilar opacities; trace right pleural effusion   -Consolidation durvalumab started 01/25/2022 (every 4 weeks)   -03/18/2022: Surveillance chest CT with contrast (comparison: 12/27/2021): Moderate right pleural effusion, larger than before; increased irregular right perihilar opacities particularly anteriorly, may be treatment related; infectious/inflammatory patchy groundglass opacities left lower lung lobe   -01/25/2022: TSH and free T4 normal  -03/22/2022: TSH, free T4 normal  -04/05/2022: CT A/P with contrast (comparison: 12/27/2021): Right-sided pleural effusion that appears loculated; stable left renal cyst  -04/05/2022: Whole-body nuclear medicine bone scan (comparison: Bone scan 09/11/2021): No bone metastasis  -Cycle #4 of consolidation durvalumab on 04/19/2022  -04/22/2022: Right thoracentesis: 40 cc, yellow, clear fluid drained (albumin 3.3; amylase 57; glucose 103; ; protein 5.4)  -s/p maintenance durvalumab 1500 mg IV 06/14/2022 (cycle 6)  -06/16/2022:  Surveillance chest CT without contrast (comparison:  CT chest 03/18/2022):  1. Chronic soft tissue attenuation with loss of normal fat planes in the subcarinal and right hilar region with bronchial wall thickening, narrowing of the right mainstem bronchus and occlusion of the right upper lobe bronchus.  This is similar to prior exam.  2. Perihilar and peribronchovascular  opacities extending into the right upper lobe are again seen, slightly improved compared to the previous exam.  3. Moderate right pleural effusion  4. Chronic thickening of the esophagus.  5. Unchanged lesion at T10  -06/13/2022:  TSH and free T4 normal      09/20/2021:   Presents for initial medical oncology consultation, accompanied by his wife.  Very pleasant gentleman.  In no acute discomfort.  Looks chronically ill.   -Rates his general health as 7 on a scale of 1-10, if 10 could be the best.    -Cough.  Bad.  Yellow phlegm.  No hemoptysis.  Comes in fits.  Smoked for several years; for last 2 months, 2 cigarettes daily.  Exertional dyspnea has improved with radiation treatment.   -Today, will finish 2 weeks of hypofractionated palliative radiation therapy; dyspnea has improved with that.   -Fatigue.  ECOG 2.  Had to stop working in August when he could not work anymore because of progressive dyspnea and fatigue.   -Weight loss.  Has lost 35 pounds in last 1 year, including 15 pounds in last 3 months.  Appetite is so-so.  We will start Megace.   -No unusual headaches, focal neuro symptoms, chest pain, hemoptysis, abdominal pain, nausea, vomiting, GI bleeding, etc.        Interval history:    05/16/2022:  -03/22/2022: TSH, free T4 normal  -04/05/2022: CT A/P with contrast (comparison: 12/27/2021): Right-sided pleural effusion that appears loculated; stable left renal cyst  -04/05/2022: Whole-body nuclear medicine bone scan (comparison: Bone scan 09/11/2021): No bone metastasis  -Cycle #4 of consolidation durvalumab on 04/19/2022  -04/22/2022: Right thoracentesis: 40 cc, yellow, clear fluid drained (albumin 3.3; amylase 57; glucose 103; ; protein 5.4)  -05/16/2022:  Labs reviewed; hemoglobin 30.8; CBC and CMP/B unremarkable; glucose 62 mg/dL  Presents for follow-up visit, accompanied by his wife.  In no acute discomfort.  Overall, doing well.  ECOG 1. Some right-sided pleuritic chest pain.  No fevers,  chills, or hemoptysis.  Does not smoke.  Chronic stable mild-to-moderate dyspnea.  Some numbness and tingling in feet.  No unusual headaches or focal neurological symptoms.  Good appetite.  Wants to have a couple of teeth pulled out which which are apparently sticking into the sinuses.    07/11/2022:  -s/p maintenance durvalumab 1500 mg IV 06/14/2022 (cycle 6)  -06/16/2022:  Surveillance chest CT without contrast (comparison:  CT chest 03/18/2022):  1. Chronic soft tissue attenuation with loss of normal fat planes in the subcarinal and right hilar region with bronchial wall thickening, narrowing of the right mainstem bronchus and occlusion of the right upper lobe bronchus.  This is similar to prior exam.  2. Perihilar and peribronchovascular opacities extending into the right upper lobe are again seen, slightly improved compared to the previous exam.  3. Moderate right pleural effusion  4. Chronic thickening of the esophagus.  5. Unchanged lesion at T10  -06/13/2022:  TSH and free T4 normal   Presents for a follow-up visit.  In no acute discomfort.  Stopped smoking 1 year ago.  Accompanied by his wife.  Apparently, gets anxious in the morning, 10 going to work.  He works in construction.  ECOG 0. No suicidal or homicidal ideation.  Advised him to follow-up with his PCP for management of anxiety.  Otherwise, chronic stable symptoms include fatigue, some exertional dyspnea, cough, wheezing, heartburn.  No unusual headaches or focal neurological symptoms.  No immunologic side effects with ongoing consolidation therapy with durvalumab.         Review of systems:   All systems reviewed, and found to be negative except for the symptoms detailed above.        Physical examination:   VITAL SIGNS:  Reviewed.       GENERAL:  In no apparent distress.     HEAD:  No signs of head trauma.   EYES:  Pupils are equal.  Extraocular motions intact.     EARS:  Hearing grossly intact.   MOUTH:  Oropharynx is normal.    NECK:  No  adenopathy, no JVD.      CHEST:  Chest with clear breath sounds bilaterally.  No wheezes, rales, or rhonchi.     CARDIAC:  Regular rate and rhythm.  S1 and S2, without murmurs, gallops, or rubs.   VASCULAR:  No Edema.  Peripheral pulses normal and equal in all extremities.   ABDOMEN:  Soft, without detectable tenderness.  No sign of distention.  No   rebound or guarding, and no masses palpated.   Bowel Sounds normal.   MUSCULOSKELETAL:  Good range of motion of all major joints. Extremities without clubbing, cyanosis or edema.     NEUROLOGIC EXAM:  Alert and oriented x 3.  No focal sensory or strength deficits.   Speech normal.  Follows commands.   PSYCHIATRIC:  Mood normal.SKIN:  No rash or lesions.  09/30/2021: 30 minutes.  In no acute discomfort.  Looks chronically ill.  Digital clubbing noted.  Breath sounds diminished right lung base.  Rhonchi left lung field.  No palpable lymphadenopathy.      Assessment:   Locally advanced, obviously inoperable squamous cell carcinoma of lung;   large, 10 cm mediastinal mass, possibly arising in the right lower lung lobe, causing compressive effects on right mainstem bronchus and right pulmonary artery;   no metastasis;   s/p bronchoscopic biopsy of carinal mass 08/18/2021;   large fungating mass at maximiliano, obliterating the right mainstem bronchus and partially occluding left mainstem;   causing hyponatremia, hypercalcemia;   worsening dyspnea, fever, postobstructive pneumonia    # squamous cell carcinoma of lung:  To summarize:  -bronchoscopy 08/18/2021  -10 cm subcarinal mass with compressive effects, right lower lung lobe large masslike consolidation with central cavitation, large fungating mass at maximiliano, obliterating the right mainstem bronchus and partially occluding left mainstem  -stage IIIB or IIIC  -postobstructive pneumonia and hospitalization  -S/P laser tumor debulking at [facility] 08/27/2021  -hyponatremia secondary to SIADH  -hypercalcemia  -S/P palliative  radiotherapy 09/2021, pending definitive concurrent chemoradiation therapy, with subjective and objective improvement  -did not receive chemotherapy concurrent with radiotherapy  -Followed by sequential chemotherapy with cisplatin/docetaxel q3 weeks x4 cycles (10/2021-12/2021)  -with considerable improvement  -consolidation durvalumab started 01/25/2022) q4 weeks)  -no definitive recurrence on surveillance noncontrast chest CT 06/16/2022; moderate right pleural effusion      #Molecular markers:   -EGFR mutation negative; PD-L1 negative (TPS 0%); KRAS mutation negative;   -ALK gene rearrangement negative; ROS1 gene rearrangement negative; NTRK NGS fusion panel negative   -BRAF mutation negative; RET gene rearrangement not detected          #Microcytic anemia (relative iron deficiency +/- anemia of chronic disease/malignancy):   -Hemoglobin 6.8-9.3   -MCV 79.7   -MCH, MCHC low   -Stool for occult blood negative x2 (09/2021)   -B12 normal, 755 (09/06/2021)   -Serum iron 9, TIBC 157, transferrin saturation 6%, ferritin 795.5 (09/06/2021)   -Folate low, 6.5 (09/06/2021   -09/30/2021: Serum iron 24, TIBC 220, transferrin saturation 11%, ferritin 714.65 (anemia of chronic disease/relative iron deficiency); B12 level 1046; RBC folate 1147; hemoglobin 10.5          #Hypercalcemia of malignancy:   Calcium level:   -08/06/2021: 10.3 (albumin 2.5)   -9.6-10.2 between 08/07/2021-09/08/2021   -09/09/2021: 10.5 (albumin 1.8)   -09/12/2021: 10.6 (albumin 2.2)   -09/21/2021: 8.2 (albumin 2.1)   -08/07/2021: Intact PTH level normal, 12.7; intact PTH level <2.0   -09/10/2021: PTH needed peptide <2.0   -Zometa 4 mg IV x1 (09/12/2021) >> hypercalcemia resolved    -09/30/2021: Ionized calcium level 5.1, normal (Serum calcium 9.3, albumin 2.6, corrected calcium 10.42)          #Thrombocytosis:   (Subsequently, spontaneously resolved)   -Platelet count 413-588K   -Almost certainly, secondary to underlying malignancy and relative iron  deficiency   -09/30/2021: ELDON-2 mutation negative   -10/08/2021: BCR-ABL1 1 fusion transcripts negative          #Hyponatremia secondary to SIADH:   -Sodium 128-130   -Treated with fluid restriction, salt tablets (by nephrology)      Plan:    -03/18/2022: Surveillance CT chest with contrast: Moderate right pleural effusion, larger than before  -04/22/2022:  Right thoracentesis, 40 cc, clear, albumin 3.3, glucose 103, , protein 5.4 (exudative) (apparently, cytology was not sent)  -no definitive recurrence on surveillance noncontrast chest CT 06/16/2022; moderate right pleural effusion  -Will follow radiologically   >>>  -Continue consolidation immunotherapy with durvalumab 1500 mg IV every 4 weeks for up to 12 months, pending analysis of right pleural fluid   -Check TSH level every 2 months (next, mid August)     Consolidation immunotherapy for patients with unresectable stage II/III NSCLC, PS 0-1, and no disease progression after definitive concurrent chemoradiation   Durvalumab 10 mg/kg IV every 2 weeks or 1500 mg every 4 weeks for up to 12 months (patients with a body weight of 30 kg or > 30 kg) (category 1 for stage III; category 2A for stage II)   This maximizes disease-free survival, 12 month progression free survival, 18 month progression free survival, higher response rate, longer median duration of response, and longer median time to death or distant metastasis.     Initiate surveillance for stage III non-small cell lung cancer, treated with chemoradiation therapy   -Completed treatment 12/2021   -History and physical and chest CT +/- contrast every 3 months for 3 years (12/2021-12/2024), then every 6 months for 2 years, then history and physical and low-dose noncontrast enhanced chest CT annually   >>>  -repeat contrast enhanced chest CT for surveillance in 3 months (next, mid September)     -Microcytic anemia   -Anemia of chronic disease/relative iron deficiency     Hypercalcemia of malignancy    -S/p Zometa 4 mg IV x1 (09/12/2021)     Thrombocytosis   -Almost certainly, reactive (iron deficiency anemia, malignancy)   -JAK2 mutation negative   -BCR-ABL 1 fusion transcripts negative     Hyponatremia secondary to SIADH secondary to malignancy   -Managed by renal; continues to take salt tablets   -Continue fluid restriction, will monitor sodium level    Follow-up with NP in 1 month, with CBC and CMP.    Above discussed with him.  All questions answered.  Discussed labs and scans and gave him copies of relevant report.  He understands and agrees with this plan.   ---------------      Surveillance:   Discussion: Surveillance for stage I-II non-small cell lung cancer (primary treatment included radiotherapy) or stage III or stage IV (oligo metastatic with all sites treated with definitive intent):     As per NCCN guidelines, history and physical and chest CT with or without contrast every 3-6 months for 3 years; then history and physical and chest CT with or without contrast every 6 months for 2 years; then history and physical and a low-dose noncontrast enhanced chest CT annually.  Residual or new radiographic abnormalities may require more frequent imaging.  PET CT scan or brain MRI scan not routinely indicated.  However, many benign conditions like atelectasis/consolidation/radiation fibrosis are difficult to differentiate from neoplasm on standard CT imaging, and PET CT scan can be used to differentiate to malignancy in these settings.  However, if PET CT scan is to be used as a problem solving tool in patients of radiation therapy, then histopathologic confirmation of recurrent disease is needed because it area as previously treated with radiation therapy can remain FDG avid for up to 2 years.

## 2022-07-11 ENCOUNTER — TELEPHONE (OUTPATIENT)
Dept: HEMATOLOGY/ONCOLOGY | Facility: CLINIC | Age: 47
End: 2022-07-11

## 2022-07-11 ENCOUNTER — TELEPHONE (OUTPATIENT)
Dept: HEMATOLOGY/ONCOLOGY | Facility: CLINIC | Age: 47
End: 2022-07-11
Payer: MEDICAID

## 2022-07-11 ENCOUNTER — OFFICE VISIT (OUTPATIENT)
Dept: HEMATOLOGY/ONCOLOGY | Facility: CLINIC | Age: 47
End: 2022-07-11
Payer: MEDICAID

## 2022-07-11 VITALS
HEART RATE: 69 BPM | SYSTOLIC BLOOD PRESSURE: 131 MMHG | TEMPERATURE: 98 F | OXYGEN SATURATION: 100 % | RESPIRATION RATE: 20 BRPM | BODY MASS INDEX: 19.99 KG/M2 | HEIGHT: 65 IN | WEIGHT: 120 LBS | DIASTOLIC BLOOD PRESSURE: 88 MMHG

## 2022-07-11 DIAGNOSIS — E83.52 HYPERCALCEMIA: ICD-10-CM

## 2022-07-11 DIAGNOSIS — J90 PLEURAL EFFUSION ON RIGHT: ICD-10-CM

## 2022-07-11 DIAGNOSIS — E22.2 SIADH (SYNDROME OF INAPPROPRIATE ADH PRODUCTION): ICD-10-CM

## 2022-07-11 DIAGNOSIS — C34.90 SQUAMOUS CELL CARCINOMA OF LUNG, UNSPECIFIED LATERALITY: Primary | ICD-10-CM

## 2022-07-11 DIAGNOSIS — D50.9 IRON DEFICIENCY ANEMIA, UNSPECIFIED IRON DEFICIENCY ANEMIA TYPE: ICD-10-CM

## 2022-07-11 DIAGNOSIS — D63.8 ANEMIA OF CHRONIC DISEASE: ICD-10-CM

## 2022-07-11 PROCEDURE — 99214 PR OFFICE/OUTPT VISIT, EST, LEVL IV, 30-39 MIN: ICD-10-PCS | Mod: S$PBB,,, | Performed by: INTERNAL MEDICINE

## 2022-07-11 PROCEDURE — 1159F MED LIST DOCD IN RCRD: CPT | Mod: CPTII,,, | Performed by: INTERNAL MEDICINE

## 2022-07-11 PROCEDURE — 3079F DIAST BP 80-89 MM HG: CPT | Mod: CPTII,,, | Performed by: INTERNAL MEDICINE

## 2022-07-11 PROCEDURE — 3079F PR MOST RECENT DIASTOLIC BLOOD PRESSURE 80-89 MM HG: ICD-10-PCS | Mod: CPTII,,, | Performed by: INTERNAL MEDICINE

## 2022-07-11 PROCEDURE — 3075F PR MOST RECENT SYSTOLIC BLOOD PRESS GE 130-139MM HG: ICD-10-PCS | Mod: CPTII,,, | Performed by: INTERNAL MEDICINE

## 2022-07-11 PROCEDURE — 1159F PR MEDICATION LIST DOCUMENTED IN MEDICAL RECORD: ICD-10-PCS | Mod: CPTII,,, | Performed by: INTERNAL MEDICINE

## 2022-07-11 PROCEDURE — 3008F PR BODY MASS INDEX (BMI) DOCUMENTED: ICD-10-PCS | Mod: CPTII,,, | Performed by: INTERNAL MEDICINE

## 2022-07-11 PROCEDURE — 99214 OFFICE O/P EST MOD 30 MIN: CPT | Mod: PBBFAC | Performed by: INTERNAL MEDICINE

## 2022-07-11 PROCEDURE — 3075F SYST BP GE 130 - 139MM HG: CPT | Mod: CPTII,,, | Performed by: INTERNAL MEDICINE

## 2022-07-11 PROCEDURE — 1160F PR REVIEW ALL MEDS BY PRESCRIBER/CLIN PHARMACIST DOCUMENTED: ICD-10-PCS | Mod: CPTII,,, | Performed by: INTERNAL MEDICINE

## 2022-07-11 PROCEDURE — 1160F RVW MEDS BY RX/DR IN RCRD: CPT | Mod: CPTII,,, | Performed by: INTERNAL MEDICINE

## 2022-07-11 PROCEDURE — 3008F BODY MASS INDEX DOCD: CPT | Mod: CPTII,,, | Performed by: INTERNAL MEDICINE

## 2022-07-11 PROCEDURE — 99214 OFFICE O/P EST MOD 30 MIN: CPT | Mod: S$PBB,,, | Performed by: INTERNAL MEDICINE

## 2022-07-11 NOTE — TELEPHONE ENCOUNTER
Orders for today:    Continue durvalumab every 4 weeks  Check TSH every 2 months; next, middle of August    Repeat noncontrast chest CT for surveillance mid September    Follow-up with NP in 1 month, with CBC and CMP.

## 2022-07-12 ENCOUNTER — INFUSION (OUTPATIENT)
Dept: INFUSION THERAPY | Facility: HOSPITAL | Age: 47
End: 2022-07-12
Attending: INTERNAL MEDICINE
Payer: MEDICAID

## 2022-07-12 VITALS
SYSTOLIC BLOOD PRESSURE: 158 MMHG | TEMPERATURE: 98 F | DIASTOLIC BLOOD PRESSURE: 99 MMHG | HEIGHT: 65 IN | RESPIRATION RATE: 20 BRPM | OXYGEN SATURATION: 100 % | BODY MASS INDEX: 20.35 KG/M2 | WEIGHT: 122.13 LBS | HEART RATE: 64 BPM

## 2022-07-12 DIAGNOSIS — C34.90 SQUAMOUS CELL CARCINOMA OF LUNG, UNSPECIFIED LATERALITY: Primary | ICD-10-CM

## 2022-07-12 PROCEDURE — A4216 STERILE WATER/SALINE, 10 ML: HCPCS | Performed by: INTERNAL MEDICINE

## 2022-07-12 PROCEDURE — 63600175 PHARM REV CODE 636 W HCPCS: Performed by: INTERNAL MEDICINE

## 2022-07-12 PROCEDURE — 96413 CHEMO IV INFUSION 1 HR: CPT

## 2022-07-12 PROCEDURE — 25000003 PHARM REV CODE 250: Performed by: INTERNAL MEDICINE

## 2022-07-12 RX ORDER — EPINEPHRINE 0.3 MG/.3ML
0.3 INJECTION SUBCUTANEOUS ONCE AS NEEDED
Status: DISCONTINUED | OUTPATIENT
Start: 2022-07-12 | End: 2022-07-12 | Stop reason: HOSPADM

## 2022-07-12 RX ORDER — DIPHENHYDRAMINE HYDROCHLORIDE 50 MG/ML
50 INJECTION INTRAMUSCULAR; INTRAVENOUS ONCE AS NEEDED
Status: DISCONTINUED | OUTPATIENT
Start: 2022-07-12 | End: 2022-07-12 | Stop reason: HOSPADM

## 2022-07-12 RX ORDER — HEPARIN 100 UNIT/ML
500 SYRINGE INTRAVENOUS
Status: DISCONTINUED | OUTPATIENT
Start: 2022-07-12 | End: 2022-07-12 | Stop reason: HOSPADM

## 2022-07-12 RX ORDER — SODIUM CHLORIDE 0.9 % (FLUSH) 0.9 %
10 SYRINGE (ML) INJECTION
Status: DISCONTINUED | OUTPATIENT
Start: 2022-07-12 | End: 2022-07-12 | Stop reason: HOSPADM

## 2022-07-12 RX ADMIN — SODIUM CHLORIDE, PRESERVATIVE FREE 10 ML: 5 INJECTION INTRAVENOUS at 01:07

## 2022-07-12 RX ADMIN — DURVALUMAB 1500 MG: 500 INJECTION, SOLUTION INTRAVENOUS at 12:07

## 2022-07-12 RX ADMIN — Medication 500 UNITS: at 01:07

## 2022-07-12 RX ADMIN — SODIUM CHLORIDE: 9 INJECTION, SOLUTION INTRAVENOUS at 11:07

## 2022-07-12 NOTE — NURSING
Pt here for C 7 imfinzi infusion.  Pt denies any pain, but does report a cough.  Pt states he is back to work.  Reports a fall a few weeks ago while working and sprained his ankle.  States it was due to the type of work he was doing.

## 2022-07-15 PROBLEM — F41.8 DEPRESSION WITH ANXIETY: Status: ACTIVE | Noted: 2022-07-15

## 2022-07-15 PROBLEM — F90.0 ATTENTION DEFICIT HYPERACTIVITY DISORDER (ADHD), PREDOMINANTLY INATTENTIVE TYPE: Chronic | Status: ACTIVE | Noted: 2022-07-15

## 2022-07-15 PROBLEM — D50.9 IRON DEFICIENCY ANEMIA: Chronic | Status: ACTIVE | Noted: 2022-05-15

## 2022-07-15 PROBLEM — C34.90 SQUAMOUS CELL CARCINOMA OF LUNG: Chronic | Status: ACTIVE | Noted: 2022-03-21

## 2022-07-15 PROBLEM — J90 PLEURAL EFFUSION ON RIGHT: Chronic | Status: ACTIVE | Noted: 2022-07-09

## 2022-07-15 PROBLEM — E22.2 SIADH (SYNDROME OF INAPPROPRIATE ADH PRODUCTION): Chronic | Status: ACTIVE | Noted: 2022-05-15

## 2022-07-15 PROBLEM — F41.8 DEPRESSION WITH ANXIETY: Chronic | Status: ACTIVE | Noted: 2022-07-15

## 2022-07-15 PROBLEM — Z72.0 TOBACCO USER: Status: ACTIVE | Noted: 2022-07-15

## 2022-07-15 PROBLEM — F90.0 ATTENTION DEFICIT HYPERACTIVITY DISORDER (ADHD), PREDOMINANTLY INATTENTIVE TYPE: Status: ACTIVE | Noted: 2022-07-15

## 2022-07-15 PROBLEM — E83.52 HYPERCALCEMIA: Chronic | Status: ACTIVE | Noted: 2022-05-15

## 2022-07-15 PROBLEM — R64 CACHEXIA: Chronic | Status: ACTIVE | Noted: 2022-07-15

## 2022-07-15 PROBLEM — Z72.0 TOBACCO USER: Chronic | Status: ACTIVE | Noted: 2022-07-15

## 2022-07-15 PROBLEM — R64 CACHEXIA: Status: ACTIVE | Noted: 2022-07-15

## 2022-07-15 PROBLEM — D63.8 ANEMIA OF CHRONIC DISEASE: Chronic | Status: ACTIVE | Noted: 2022-05-15

## 2022-07-18 ENCOUNTER — HOSPITAL ENCOUNTER (OUTPATIENT)
Dept: RADIOLOGY | Facility: HOSPITAL | Age: 47
Discharge: HOME | End: 2022-07-18
Attending: STUDENT IN AN ORGANIZED HEALTH CARE EDUCATION/TRAINING PROGRAM
Payer: MEDICAID

## 2022-07-18 ENCOUNTER — OFFICE VISIT (OUTPATIENT)
Dept: ORTHOPEDICS | Facility: CLINIC | Age: 47
End: 2022-07-18
Payer: MEDICAID

## 2022-07-18 VITALS
SYSTOLIC BLOOD PRESSURE: 136 MMHG | RESPIRATION RATE: 16 BRPM | BODY MASS INDEX: 19.99 KG/M2 | WEIGHT: 120 LBS | HEIGHT: 65 IN | HEART RATE: 73 BPM | OXYGEN SATURATION: 99 % | DIASTOLIC BLOOD PRESSURE: 80 MMHG

## 2022-07-18 DIAGNOSIS — M25.572 ACUTE LEFT ANKLE PAIN: ICD-10-CM

## 2022-07-18 DIAGNOSIS — S82.822A CLOSED TORUS FRACTURE OF DISTAL END OF LEFT FIBULA, INITIAL ENCOUNTER: Primary | ICD-10-CM

## 2022-07-18 PROCEDURE — 99213 OFFICE O/P EST LOW 20 MIN: CPT | Mod: PBBFAC,25

## 2022-07-18 PROCEDURE — 73610 X-RAY EXAM OF ANKLE: CPT | Mod: TC,LT

## 2022-07-18 PROCEDURE — 27786 TREATMENT OF ANKLE FRACTURE: CPT | Mod: LT,PBBFAC | Performed by: STUDENT IN AN ORGANIZED HEALTH CARE EDUCATION/TRAINING PROGRAM

## 2022-07-18 RX ORDER — DICLOFENAC SODIUM 10 MG/G
2 GEL TOPICAL 4 TIMES DAILY PRN
Qty: 100 G | Refills: 3 | Status: SHIPPED | OUTPATIENT
Start: 2022-07-18 | End: 2023-04-25

## 2022-07-18 NOTE — PROGRESS NOTES
Subjective:      Patient ID: Deepak Pratt is a 47 y.o. male.    Chief Complaint: Pain of the Left Ankle    HPI  Left ankle pain.     On 6/1/22 he slipped from his truck and twisted his left ankle. He had immediate pain. The next day, he went to an Urgent Care, note reviewed. Xrays showed concern for a fracture of the distal fibula. He was placed in a walking boot and made non weight bearing.     Today he reports improvement of pain and swelling. He weaned himself out of the boot a few weeks ago. He is able to ambulate without difficulty. Admits to mild lateral ankle swelling. Wakes up every day with zero pain. Continues to rest/ice/elevate. Denies any numbness/tingling.     PMH includes: Squamous cell carcinoma of lung, stage III    Review of Systems   All other systems reviewed and are negative.        Objective:      Vitals:    07/18/22 0829   BP: 136/80   Pulse: 73   Resp: 16               General Musculoskeletal Exam   Gait: normal     Right Ankle/Foot Exam   Right ankle exam is normal.    Left Ankle/Foot Exam     Inspection  Effusion: Ankle - present     Swelling   The patient is swollen on the lateral malleolus.    Range of Motion   The patient has normal left ankle ROM.     Muscle Strength   The patient has normal left ankle strength.    Tests   Anterior drawer: negative  Varus tilt: negative  Squeeze Test: absent    Other   Peroneal Subluxation: negative    Comments:  NO TTP of lateral, medial, or posterior malleolus. Mild TTP of peroneals. Pain with resisted eversion.         Xray Left Ankle Complete (6/2/22)  FINDINGS: Minimally displaced fracture of the distal fibula/lateral malleolus with associated swelling there is a small fragment at the very tip of the medial malleolus small avulsion cannot be completely excluded no other fractures or dislocations identified. Joint spaces preserved. No blastic or lytic lesions. Soft tissue swelling  Impression: Fractures as above with associated soft tissue  swelling.    Xray Left Ankle Complete today (7/18/22)  My Impression: Patel B distal fibular torus fracture. Closed, nondisplaced. Radiographic evidence of healing.         Assessment:       Encounter Diagnosis   Name Primary?    Closed torus fracture of distal end of left fibula, initial encounter Yes          Plan:       Deepak was seen today for pain.    Diagnoses and all orders for this visit:    Closed torus fracture of distal end of left fibula, initial encounter  -     X-Ray Ankle Complete Left; Future      Dx: Left Distal Fibular torus fracture. Closed, nondisplaced. No evidence of posterior malleolus fracture on Xray. Clinical and radiographic evidence of healing. Injury date 6/1/22.   Treatment: Plan for Fracture Management: Continue with supportive bracing, ROM exercises, and PRICE therapy.   Stabilization: None  Activity: Activity as Tolerated  Therapy: Physical Therapy  Medication: continue previously prescribed medication; medication precautions given. Votlaren Gel TID PRN  RTC: PRN; As needed  Imaging: radiological studies ordered and independently reviewed; discussed with patient; radiologist interpretation pending   Additional Workup: None

## 2022-07-21 PROBLEM — R64 CACHEXIA: Chronic | Status: RESOLVED | Noted: 2022-07-15 | Resolved: 2022-07-21

## 2022-07-21 PROBLEM — Z72.0 TOBACCO USER: Chronic | Status: RESOLVED | Noted: 2022-07-15 | Resolved: 2022-07-21

## 2022-08-11 ENCOUNTER — OFFICE VISIT (OUTPATIENT)
Dept: HEMATOLOGY/ONCOLOGY | Facility: CLINIC | Age: 47
End: 2022-08-11
Payer: MEDICAID

## 2022-08-11 ENCOUNTER — CLINICAL SUPPORT (OUTPATIENT)
Dept: HEMATOLOGY/ONCOLOGY | Facility: CLINIC | Age: 47
End: 2022-08-11
Payer: MEDICAID

## 2022-08-11 VITALS
HEART RATE: 71 BPM | BODY MASS INDEX: 20.09 KG/M2 | DIASTOLIC BLOOD PRESSURE: 89 MMHG | WEIGHT: 120.56 LBS | OXYGEN SATURATION: 99 % | HEIGHT: 65 IN | RESPIRATION RATE: 20 BRPM | SYSTOLIC BLOOD PRESSURE: 135 MMHG | TEMPERATURE: 98 F

## 2022-08-11 DIAGNOSIS — C34.90 SQUAMOUS CELL CARCINOMA OF LUNG, UNSPECIFIED LATERALITY: Primary | ICD-10-CM

## 2022-08-11 DIAGNOSIS — C34.90 SQUAMOUS CELL CARCINOMA OF LUNG, UNSPECIFIED LATERALITY: ICD-10-CM

## 2022-08-11 LAB
ALBUMIN SERPL-MCNC: 4 GM/DL (ref 3.5–5)
ALBUMIN/GLOB SERPL: 1 RATIO (ref 1.1–2)
ALP SERPL-CCNC: 52 UNIT/L (ref 40–150)
ALT SERPL-CCNC: 5 UNIT/L (ref 0–55)
AST SERPL-CCNC: 12 UNIT/L (ref 5–34)
BASOPHILS # BLD AUTO: 0.04 X10(3)/MCL (ref 0–0.2)
BASOPHILS NFR BLD AUTO: 0.6 %
BILIRUBIN DIRECT+TOT PNL SERPL-MCNC: 0.4 MG/DL
BUN SERPL-MCNC: 17.5 MG/DL (ref 8.9–20.6)
CALCIUM SERPL-MCNC: 10.1 MG/DL (ref 8.4–10.2)
CHLORIDE SERPL-SCNC: 103 MMOL/L (ref 98–107)
CO2 SERPL-SCNC: 30 MMOL/L (ref 22–29)
CREAT SERPL-MCNC: 0.85 MG/DL (ref 0.73–1.18)
EOSINOPHIL # BLD AUTO: 0.14 X10(3)/MCL (ref 0–0.9)
EOSINOPHIL NFR BLD AUTO: 2 %
ERYTHROCYTE [DISTWIDTH] IN BLOOD BY AUTOMATED COUNT: 14.7 % (ref 11.5–17)
FOLATE SERPL-MCNC: 7 NG/ML (ref 7–31.4)
GFR SERPLBLD CREATININE-BSD FMLA CKD-EPI: >60 MLS/MIN/1.73/M2
GLOBULIN SER-MCNC: 4 GM/DL (ref 2.4–3.5)
GLUCOSE SERPL-MCNC: 84 MG/DL (ref 74–100)
HCT VFR BLD AUTO: 44.3 % (ref 42–52)
HGB BLD-MCNC: 14.3 GM/DL (ref 14–18)
IMM GRANULOCYTES # BLD AUTO: 0.03 X10(3)/MCL (ref 0–0.04)
IMM GRANULOCYTES NFR BLD AUTO: 0.4 %
IRON SATN MFR SERPL: 24 % (ref 20–50)
IRON SERPL-MCNC: 62 UG/DL (ref 65–175)
LYMPHOCYTES # BLD AUTO: 1.26 X10(3)/MCL (ref 0.6–4.6)
LYMPHOCYTES NFR BLD AUTO: 18.3 %
MAGNESIUM SERPL-MCNC: 2.2 MG/DL (ref 1.6–2.6)
MCH RBC QN AUTO: 28.6 PG (ref 27–31)
MCHC RBC AUTO-ENTMCNC: 32.3 MG/DL (ref 33–36)
MCV RBC AUTO: 88.6 FL (ref 80–94)
MONOCYTES # BLD AUTO: 0.48 X10(3)/MCL (ref 0.1–1.3)
MONOCYTES NFR BLD AUTO: 7 %
NEUTROPHILS # BLD AUTO: 4.9 X10(3)/MCL (ref 2.1–9.2)
NEUTROPHILS NFR BLD AUTO: 71.7 %
NRBC BLD AUTO-RTO: 0 %
PLATELET # BLD AUTO: 193 X10(3)/MCL (ref 130–400)
PMV BLD AUTO: 9.6 FL (ref 7.4–10.4)
POTASSIUM SERPL-SCNC: 4.9 MMOL/L (ref 3.5–5.1)
PROT SERPL-MCNC: 8 GM/DL (ref 6.4–8.3)
RBC # BLD AUTO: 5 X10(6)/MCL (ref 4.7–6.1)
SODIUM SERPL-SCNC: 140 MMOL/L (ref 136–145)
TIBC SERPL-MCNC: 197 UG/DL (ref 69–240)
TIBC SERPL-MCNC: 259 UG/DL (ref 250–450)
TRANSFERRIN SERPL-MCNC: 217 MG/DL (ref 174–364)
VIT B12 SERPL-MCNC: 673 PG/ML (ref 213–816)
WBC # SPEC AUTO: 6.9 X10(3)/MCL (ref 4.5–11.5)

## 2022-08-11 PROCEDURE — 1159F MED LIST DOCD IN RCRD: CPT | Mod: CPTII,,, | Performed by: NURSE PRACTITIONER

## 2022-08-11 PROCEDURE — 3079F DIAST BP 80-89 MM HG: CPT | Mod: CPTII,,, | Performed by: NURSE PRACTITIONER

## 2022-08-11 PROCEDURE — 3075F PR MOST RECENT SYSTOLIC BLOOD PRESS GE 130-139MM HG: ICD-10-PCS | Mod: CPTII,,, | Performed by: NURSE PRACTITIONER

## 2022-08-11 PROCEDURE — 85025 COMPLETE CBC W/AUTO DIFF WBC: CPT

## 2022-08-11 PROCEDURE — 1160F RVW MEDS BY RX/DR IN RCRD: CPT | Mod: CPTII,,, | Performed by: NURSE PRACTITIONER

## 2022-08-11 PROCEDURE — 99214 OFFICE O/P EST MOD 30 MIN: CPT | Mod: PBBFAC | Performed by: NURSE PRACTITIONER

## 2022-08-11 PROCEDURE — 1159F PR MEDICATION LIST DOCUMENTED IN MEDICAL RECORD: ICD-10-PCS | Mod: CPTII,,, | Performed by: NURSE PRACTITIONER

## 2022-08-11 PROCEDURE — 3079F PR MOST RECENT DIASTOLIC BLOOD PRESSURE 80-89 MM HG: ICD-10-PCS | Mod: CPTII,,, | Performed by: NURSE PRACTITIONER

## 2022-08-11 PROCEDURE — 3008F PR BODY MASS INDEX (BMI) DOCUMENTED: ICD-10-PCS | Mod: CPTII,,, | Performed by: NURSE PRACTITIONER

## 2022-08-11 PROCEDURE — 99214 OFFICE O/P EST MOD 30 MIN: CPT | Mod: S$PBB,,, | Performed by: NURSE PRACTITIONER

## 2022-08-11 PROCEDURE — 36415 COLL VENOUS BLD VENIPUNCTURE: CPT

## 2022-08-11 PROCEDURE — 83735 ASSAY OF MAGNESIUM: CPT

## 2022-08-11 PROCEDURE — 83540 ASSAY OF IRON: CPT

## 2022-08-11 PROCEDURE — 3008F BODY MASS INDEX DOCD: CPT | Mod: CPTII,,, | Performed by: NURSE PRACTITIONER

## 2022-08-11 PROCEDURE — 3075F SYST BP GE 130 - 139MM HG: CPT | Mod: CPTII,,, | Performed by: NURSE PRACTITIONER

## 2022-08-11 PROCEDURE — 99214 PR OFFICE/OUTPT VISIT, EST, LEVL IV, 30-39 MIN: ICD-10-PCS | Mod: S$PBB,,, | Performed by: NURSE PRACTITIONER

## 2022-08-11 PROCEDURE — 80053 COMPREHEN METABOLIC PANEL: CPT

## 2022-08-11 PROCEDURE — 1160F PR REVIEW ALL MEDS BY PRESCRIBER/CLIN PHARMACIST DOCUMENTED: ICD-10-PCS | Mod: CPTII,,, | Performed by: NURSE PRACTITIONER

## 2022-08-11 PROCEDURE — 82607 VITAMIN B-12: CPT

## 2022-08-11 NOTE — PROGRESS NOTES
Problem List:   -Squamous cell carcinoma of lung, stage III   -Microcytic anemia (relative iron deficiency)   -Hypercalcemia of malignancy   -Thrombocytosis   -Hyponatremia (SIADH)     Current Treatment:  durvalumab 1500 mg every 4 weeks for up to 12 months    started 01/25/2022     Treatment History:  -bronchoscopy 08/18/2021  -S/P laser tumor debulking at [facility] 08/27/2021  -S/P palliative radiotherapy 09/2021,  -Followed by sequential chemotherapy with cisplatin/docetaxel q. 3 weeks x4 cycles (10/2021-12/2021)     Past medical history: Attention deficit disorder. Tobacco abuse. Tonsillectomy.  Social history: .  Lives in [place], [place].  Has 2 children.  Has worked as a  all his life (more than 30-35 years).  Smoked 1-1/2 packs of cigarettes daily for 15 years; then, 1 pack daily for 1 year; now, for last 2 months, 2 cigarettes daily.  Heavy alcohol use in the past; 8-10 beers daily for 10 years; quit 1-1/2 years ago.  No illicit drugs.  Family history: No family members with cancer.  Health maintenance: So far, does not have a primary care physician.  Apparently, he is going to see a primary care physician for the first time next week.     History of Present Illness:   46-year-old , smoker, presented to urgent care 08/06/2021 with complaint of shortness of breath, weakness, and 20 pound unintentional weight loss over last 1 year. Productive cough. White sputum. Night sweats and fever. Was referred to ED at [facility] for further work-up. Noted to have hypercalcemia, hyponatremia, and neutrophilic leukocytosis. Work-up showed large heterogeneous mediastinal mass and right lower lobe mass.  Squamous cell carcinoma of lung, Bronchoscopy (08/18/2021).       Interval History 6/13/2022: Patient presents alone with his wife for scheduled follow up for non small cell lung cancer. She is due to receive cycle 8 of Imfinzi tomorrow. He reports doing well without any new or  worsening symptoms. He does report SOB with exertion, weakness, and fatigue. He also reports anxiety of which his PCP recently started him on buspar. Lab work reviewed with patient, stable for treatment tomorrow. He dosent have any further questions needs or concerns.      Review of Systems: A complete 12-point ROS was performed in full with pertinent positives as described in interval history. Remainder of ROS done in full and are negative.     Physical Exam     Vitals & Measurements  Vitals:    08/11/22 1008   BP: 135/89   Pulse: 71   Resp: 20   Temp: 98.2 °F (36.8 °C)     General: Alert and oriented. NAD  Eye: Pupils are equal, round and reactive to light, Extraocular movements are intact. Normal conjunctiva  HENT: Normocephalic. Conjunctivae and EOM are normal. Pupils are equal, round, neck supple nontenter  Respiratory: Respirations are non-labored, Symmetrical chest wall expansion. Breath sounds CTA bilaterally  Cardiovascular: Regular rate, rhythm, Normal peripheral perfusion, No bilateral lower extremity edema  Gastrointestinal: Non-distended, Present bowel sounds   Genitourinary: Exam deferred  Lymphatics: No lymphadenopathy appreciated  Musculoskeletal: Moves all extremities  Integumentary: Intact. Warm, dry. No rashes, or lesions to visible skin  Neurologic: No focal deficits  Psychiatric: Cooperative. Appropriate mood and affect   ECOG Performance Scale: 1 - Capable of all self-care able to carry out light work activities.     Assessment:  To summarize:  -bronchoscopy 08/18/2021  -10 cm subcarinal mass with compressive effects, right lower lung lobe large masslike consolidation with central cavitation, large fungating mass at maximiliano, obliterating the right mainstem bronchus and partially occluding left mainstem  -stage IIIB or IIIC  -postobstructive pneumonia and hospitalization  -S/P laser tumor debulking at [facility] 08/27/2021  -hyponatremia secondary to SIADH  -hypercalcemia  -S/P palliative  radiotherapy 09/2021, pending definitive concurrent chemoradiation therapy, with subjective and objective improvement  -did not receive concurrent chemotherapy with radiotherapy  -Followed by sequential chemotherapy with cisplatin/docetaxel q. 3 weeks x4 cycles (10/2021-12/2021)  -with considerable improvement  -consolidation durvalumab started 01/25/2022) q.4 weeks)        #Molecular markers:   -EGFR mutation negative; PD-L1 negative (TPS 0%); KRAS mutation negative;   -ALK gene rearrangement negative; ROS1 gene rearrangement negative; NTRK NGS fusion panel negative   -BRAF mutation negative; RET gene rearrangement not detected          #Microcytic anemia (relative iron deficiency +/- anemia of chronic disease/malignancy):   -Hemoglobin 6.8-9.3   -MCV 79.7   -MCH, MCHC low   -Stool for occult blood negative x2 (09/2021)   -B12 normal, 755 (09/06/2021)   -Serum iron 9, TIBC 157, transferrin saturation 6%, ferritin 795.5 (09/06/2021)   -Folate low, 6.5 (09/06/2021   -09/30/2021: Serum iron 24, TIBC 220, transferrin saturation 11%, ferritin 714.65 (anemia of chronic disease/relative iron deficiency); B12 level 1046; RBC folate 1147; hemoglobin 10.5          #Hypercalcemia of malignancy:   Calcium level:   -08/06/2021: 10.3 (albumin 2.5)   -9.6-10.2 between 08/07/2021-09/08/2021   -09/09/2021: 10.5 (albumin 1.8)   -09/12/2021: 10.6 (albumin 2.2)   -09/21/2021: 8.2 (albumin 2.1)   -08/07/2021: Intact PTH level normal, 12.7; intact PTH level <2.0   -09/10/2021: PTH needed peptide <2.0   -Zometa 4 mg IV x1 (09/12/2021) >> hypercalcemia resolved    -09/30/2021: Ionized calcium level 5.1, normal (Serum calcium 9.3, albumin 2.6, corrected calcium 10.42)          #Thrombocytosis:   (Subsequently, spontaneously resolved)   -Platelet count 413-588K   -Almost certainly, secondary to underlying malignancy and relative iron deficiency   -09/30/2021: ELDON-2 mutation negative   -10/08/2021: BCR-ABL1 1 fusion transcripts  negative          #Hyponatremia secondary to SIADH:   -Sodium 128-130   -Treated with fluid restriction, salt tablets (by nephrology)        Plan:    -03/18/2022: Surveillance CT chest with contrast: Moderate right pleural effusion, larger than before  -04/22/2022:  Right thoracentesis, 40 cc, clear, albumin 3.3, glucose 103, , protein 5.4 (exudative) (apparently, cytology was not sent)  -Will follow radiologically   >>>  -Continue consolidation immunotherapy with durvalumab 1500 mg IV every 4 weeks for up to 12 months, pending analysis of right pleural fluid   -Check TSH level every 2 months (next, around 10/13/2022)       Consolidation immunotherapy for patients with unresectable stage II/III NSCLC, PS 0-1, and no disease progression after definitive concurrent chemoradiation   Durvalumab 10 mg/kg IV every 2 weeks or 1500 mg every 4 weeks for up to 12 months (patients with a body weight of 30 kg or > 30 kg) (category 1 for stage III; category 2A for stage II)   This maximizes disease-free survival, 12 month progression free survival, 18 month progression free survival, higher response rate, longer median duration of response, and longer median time to death or distant metastasis.      Initiate surveillance for stage III non-small cell lung cancer, treated with chemoradiation therapy   -Completed treatment 12/2021   -History and physical and chest CT +/- contrast every 3 months for 3 years (12/2021-12/2024), then every 6 months for 2 years, then history and physical and low-dose noncontrast enhanced chest CT annually   >>>  -repeat chest CT with contrast for surveillance in 3 months (mid Sept)-ordered today      Hyponatremia secondary to SIADH secondary to malignancy   -Managed by renal; continues to take salt tablets   -Continue fluid restriction, will monitor sodium level     Follow-up with  in 1 month on 9/8/2022, with lab work prior to cycle 9 of Imfinzi on 9/9/2022      Above discussed with  him.  All questions answered.  He understands and agrees with this plan.   ---------------        Surveillance:   Discussion: Surveillance for stage I-II non-small cell lung cancer (primary treatment included radiotherapy) or stage III or stage IV (oligo metastatic with all sites treated with definitive intent):      As per NCCN guidelines, history and physical and chest CT with or without contrast every 3-6 months for 3 years; then history and physical and chest CT with or without contrast every 6 months for 2 years; then history and physical and a low-dose noncontrast enhanced chest CT annually.  Residual or new radiographic abnormalities may require more frequent imaging.  PET CT scan or brain MRI scan not routinely indicated.  However, many benign conditions like atelectasis/consolidation/radiation fibrosis are difficult to differentiate from neoplasm on standard CT imaging, and PET CT scan can be used to differentiate to malignancy in these settings.  However, if PET CT scan is to be used as a problem solving tool in patients of radiation therapy, then histopathologic confirmation of recurrent disease is needed because it area as previously treated with radiation therapy can remain FDG avid for up to 2 years.

## 2022-08-11 NOTE — Clinical Note
Infusion tomorrow 8/12/2022 Follow up with NP in 4 weeks on 9/8/2022 with lab work  Infusion on 9/9/2022 Imfinzi infusion

## 2022-08-12 ENCOUNTER — INFUSION (OUTPATIENT)
Dept: INFUSION THERAPY | Facility: HOSPITAL | Age: 47
End: 2022-08-12
Attending: INTERNAL MEDICINE
Payer: MEDICAID

## 2022-08-12 VITALS
OXYGEN SATURATION: 100 % | SYSTOLIC BLOOD PRESSURE: 136 MMHG | DIASTOLIC BLOOD PRESSURE: 92 MMHG | BODY MASS INDEX: 20.2 KG/M2 | TEMPERATURE: 99 F | HEART RATE: 72 BPM | RESPIRATION RATE: 20 BRPM | HEIGHT: 65 IN | WEIGHT: 121.25 LBS

## 2022-08-12 DIAGNOSIS — C34.90 SQUAMOUS CELL CARCINOMA OF LUNG, UNSPECIFIED LATERALITY: Primary | ICD-10-CM

## 2022-08-12 PROCEDURE — 63600175 PHARM REV CODE 636 W HCPCS: Performed by: INTERNAL MEDICINE

## 2022-08-12 PROCEDURE — 25000003 PHARM REV CODE 250: Performed by: INTERNAL MEDICINE

## 2022-08-12 PROCEDURE — 96413 CHEMO IV INFUSION 1 HR: CPT

## 2022-08-12 PROCEDURE — A4216 STERILE WATER/SALINE, 10 ML: HCPCS | Performed by: INTERNAL MEDICINE

## 2022-08-12 RX ORDER — DIPHENHYDRAMINE HYDROCHLORIDE 50 MG/ML
50 INJECTION INTRAMUSCULAR; INTRAVENOUS ONCE AS NEEDED
Status: DISCONTINUED | OUTPATIENT
Start: 2022-08-12 | End: 2022-08-12 | Stop reason: HOSPADM

## 2022-08-12 RX ORDER — HEPARIN 100 UNIT/ML
500 SYRINGE INTRAVENOUS
Status: DISCONTINUED | OUTPATIENT
Start: 2022-08-12 | End: 2022-08-12 | Stop reason: HOSPADM

## 2022-08-12 RX ORDER — SODIUM CHLORIDE 0.9 % (FLUSH) 0.9 %
10 SYRINGE (ML) INJECTION
Status: DISCONTINUED | OUTPATIENT
Start: 2022-08-12 | End: 2022-08-12 | Stop reason: HOSPADM

## 2022-08-12 RX ORDER — EPINEPHRINE 0.3 MG/.3ML
0.3 INJECTION SUBCUTANEOUS ONCE AS NEEDED
Status: DISCONTINUED | OUTPATIENT
Start: 2022-08-12 | End: 2022-08-12 | Stop reason: HOSPADM

## 2022-08-12 RX ADMIN — SODIUM CHLORIDE, PRESERVATIVE FREE 10 ML: 5 INJECTION INTRAVENOUS at 12:08

## 2022-08-12 RX ADMIN — DURVALUMAB 1500 MG: 500 INJECTION, SOLUTION INTRAVENOUS at 11:08

## 2022-08-12 RX ADMIN — Medication 500 UNITS: at 12:08

## 2022-08-12 RX ADMIN — SODIUM CHLORIDE: 9 INJECTION, SOLUTION INTRAVENOUS at 11:08

## 2022-08-19 ENCOUNTER — HOSPITAL ENCOUNTER (OUTPATIENT)
Dept: RADIOLOGY | Facility: HOSPITAL | Age: 47
Discharge: HOME | End: 2022-08-19
Attending: INTERNAL MEDICINE
Payer: MEDICAID

## 2022-08-19 DIAGNOSIS — J90 PLEURAL EFFUSION ON RIGHT: ICD-10-CM

## 2022-08-19 DIAGNOSIS — C34.90 SQUAMOUS CELL CARCINOMA OF LUNG, UNSPECIFIED LATERALITY: ICD-10-CM

## 2022-08-19 PROCEDURE — 71260 CT THORAX DX C+: CPT | Mod: TC

## 2022-08-19 PROCEDURE — 25500020 PHARM REV CODE 255

## 2022-08-19 RX ADMIN — IOPAMIDOL 100 ML: 755 INJECTION, SOLUTION INTRAVENOUS at 10:08

## 2022-09-08 ENCOUNTER — CLINICAL SUPPORT (OUTPATIENT)
Dept: HEMATOLOGY/ONCOLOGY | Facility: CLINIC | Age: 47
End: 2022-09-08
Payer: MEDICAID

## 2022-09-08 ENCOUNTER — OFFICE VISIT (OUTPATIENT)
Dept: HEMATOLOGY/ONCOLOGY | Facility: CLINIC | Age: 47
End: 2022-09-08
Payer: MEDICAID

## 2022-09-08 VITALS
SYSTOLIC BLOOD PRESSURE: 155 MMHG | BODY MASS INDEX: 20.43 KG/M2 | OXYGEN SATURATION: 99 % | WEIGHT: 122.63 LBS | HEIGHT: 65 IN | DIASTOLIC BLOOD PRESSURE: 77 MMHG | HEART RATE: 87 BPM | RESPIRATION RATE: 16 BRPM | TEMPERATURE: 99 F

## 2022-09-08 DIAGNOSIS — E83.52 HYPERCALCEMIA: Chronic | ICD-10-CM

## 2022-09-08 DIAGNOSIS — C34.90 SQUAMOUS CELL CARCINOMA OF LUNG, UNSPECIFIED LATERALITY: ICD-10-CM

## 2022-09-08 DIAGNOSIS — C34.90 SQUAMOUS CELL CARCINOMA OF LUNG, UNSPECIFIED LATERALITY: Primary | Chronic | ICD-10-CM

## 2022-09-08 DIAGNOSIS — Z29.89 ENCOUNTER FOR IMMUNOTHERAPY: ICD-10-CM

## 2022-09-08 DIAGNOSIS — J90 PLEURAL EFFUSION ON RIGHT: Chronic | ICD-10-CM

## 2022-09-08 DIAGNOSIS — D50.8 OTHER IRON DEFICIENCY ANEMIA: Chronic | ICD-10-CM

## 2022-09-08 DIAGNOSIS — E22.2 SIADH (SYNDROME OF INAPPROPRIATE ADH PRODUCTION): Chronic | ICD-10-CM

## 2022-09-08 DIAGNOSIS — D63.8 ANEMIA OF CHRONIC DISEASE: Chronic | ICD-10-CM

## 2022-09-08 PROCEDURE — 1159F PR MEDICATION LIST DOCUMENTED IN MEDICAL RECORD: ICD-10-PCS | Mod: CPTII,,, | Performed by: NURSE PRACTITIONER

## 2022-09-08 PROCEDURE — 99214 OFFICE O/P EST MOD 30 MIN: CPT | Mod: S$PBB,,, | Performed by: NURSE PRACTITIONER

## 2022-09-08 PROCEDURE — 1159F MED LIST DOCD IN RCRD: CPT | Mod: CPTII,,, | Performed by: NURSE PRACTITIONER

## 2022-09-08 PROCEDURE — 3078F PR MOST RECENT DIASTOLIC BLOOD PRESSURE < 80 MM HG: ICD-10-PCS | Mod: CPTII,,, | Performed by: NURSE PRACTITIONER

## 2022-09-08 PROCEDURE — 99213 OFFICE O/P EST LOW 20 MIN: CPT | Mod: PBBFAC | Performed by: NURSE PRACTITIONER

## 2022-09-08 PROCEDURE — 3008F PR BODY MASS INDEX (BMI) DOCUMENTED: ICD-10-PCS | Mod: CPTII,,, | Performed by: NURSE PRACTITIONER

## 2022-09-08 PROCEDURE — 3078F DIAST BP <80 MM HG: CPT | Mod: CPTII,,, | Performed by: NURSE PRACTITIONER

## 2022-09-08 PROCEDURE — 99214 PR OFFICE/OUTPT VISIT, EST, LEVL IV, 30-39 MIN: ICD-10-PCS | Mod: S$PBB,,, | Performed by: NURSE PRACTITIONER

## 2022-09-08 PROCEDURE — 36415 COLL VENOUS BLD VENIPUNCTURE: CPT

## 2022-09-08 PROCEDURE — 3077F PR MOST RECENT SYSTOLIC BLOOD PRESSURE >= 140 MM HG: ICD-10-PCS | Mod: CPTII,,, | Performed by: NURSE PRACTITIONER

## 2022-09-08 PROCEDURE — 3008F BODY MASS INDEX DOCD: CPT | Mod: CPTII,,, | Performed by: NURSE PRACTITIONER

## 2022-09-08 PROCEDURE — 3077F SYST BP >= 140 MM HG: CPT | Mod: CPTII,,, | Performed by: NURSE PRACTITIONER

## 2022-09-08 NOTE — PROGRESS NOTES
Problem List:   -Squamous cell carcinoma of lung, stage III   -Microcytic anemia (relative iron deficiency)   -Hypercalcemia of malignancy   -Thrombocytosis   -Hyponatremia (SIADH)     Current Treatment:  Durvalumab 1500 mg every 4 weeks for up to 12 months; started 01/25/2022     Treatment History:  -bronchoscopy 08/18/2021  -S/P laser tumor debulking at [facility] 08/27/2021  -S/P palliative radiotherapy 09/2021,  -Followed by sequential chemotherapy with cisplatin/docetaxel q. 3 weeks x4 cycles (10/2021-12/2021)     Past medical history: Attention deficit disorder. Tobacco abuse. Tonsillectomy.  Social history: .  Lives in [place], [place].  Has 2 children.  Has worked as a  all his life (more than 30-35 years).  Smoked 1-1/2 packs of cigarettes daily for 15 years; then, 1 pack daily for 1 year; now, for last 2 months, 2 cigarettes daily.  Heavy alcohol use in the past; 8-10 beers daily for 10 years; quit 1-1/2 years ago.  No illicit drugs.  Family history: No family members with cancer.  Health maintenance: So far, does not have a primary care physician.  Apparently, he is going to see a primary care physician for the first time next week.     History of Present Illness:  47 year-old , smoker, presented to urgent care 08/06/2021 with complaint of shortness of breath, weakness, and 20 pound unintentional weight loss over last 1 year. Productive cough. White sputum. Night sweats and fever. Was referred to ED at [facility] for further work-up. Noted to have hypercalcemia, hyponatremia, and neutrophilic leukocytosis. Work-up showed large heterogeneous mediastinal mass and right lower lobe mass.  Squamous cell carcinoma of lung, Bronchoscopy (08/18/2021).       Interval History Clinic follow up today, 9/8/22 for lung cancer; due to receive cycle 9 of Imfinzi tomorrow. Denies any new significant symptoms or concerns. Stable energy level; stable dyspnea on exertion. No acute CP or  palpitations. Breathing stable.     Review of Systems: A complete 12-point ROS was performed in full with pertinent positives as described in interval history. Remainder of ROS done in full and are negative.     Physical Exam  Vitals:    09/08/22 1357   BP: (!) 155/77   Pulse: 87   Resp: 16   Temp: 98.7 °F (37.1 °C)     General: Alert and oriented. NAD  Eye: Pupils are equal, round and reactive to light, Extraocular movements are intact. Normal conjunctiva  HENT: Normocephalic. Conjunctivae and EOM are normal. Pupils are equal, round, neck supple nontenter  Respiratory: Respirations are non-labored, Symmetrical chest wall expansion. Breath sounds CTA bilaterally  Cardiovascular: Regular rate, rhythm, Normal peripheral perfusion, No bilateral lower extremity edema  Gastrointestinal: Non-distended, Present bowel sounds   Genitourinary: Exam deferred  Lymphatics: No lymphadenopathy appreciated  Musculoskeletal: Moves all extremities  Integumentary: Intact. Warm, dry. No rashes, or lesions to visible skin  Neurologic: No focal deficits  Psychiatric: Cooperative. Appropriate mood and affect   ECOG Performance Scale: 1 - Capable of all self-care able to carry out light work activities.     Assessment:  1.) Squamous cell carcinoma of lung, stage III  -bronchoscopy 08/18/2021  -10 cm subcarinal mass with compressive effects, right lower lung lobe large masslike consolidation with central cavitation, large fungating mass at maximiliano, obliterating the right mainstem bronchus and partially occluding left mainstem  -stage IIIB or IIIC  -postobstructive pneumonia and hospitalization  -S/P laser tumor debulking at [facility] 08/27/2021  -hyponatremia secondary to SIADH  -hypercalcemia  -S/P palliative radiotherapy 09/2021, pending definitive concurrent chemoradiation therapy, with subjective and objective improvement  -did not receive concurrent chemotherapy with radiotherapy  -Followed by sequential chemotherapy with  cisplatin/docetaxel q. 3 weeks x4 cycles (10/2021-12/2021)  -with considerable improvement  -consolidation durvalumab started 01/25/2022) q4 weeks)    -03/18/2022: Surveillance CT chest with contrast: Moderate right pleural effusion, larger than before  -04/22/2022:  Right thoracentesis, 40 cc, clear, albumin 3.3, glucose 103, , protein 5.4 (exudative) (apparently, cytology was not sent)  -Will follow radiologically  -06/16/2022: Surveillance CT chest with contrast shows chronic soft tissue attenuation with loss of normal fat planes in the subcarinal and right hilar region with bronchial wall thickening, narrowing of the right mainstem bronchus and occlusion of the right upper lobe bronchus.  This is similar to prior exam. Perihilar and peribronchovascular opacities extending into the right upper lobe are again seen, slightly improved compared to the previous exam. Moderate right pleural effusion. Chronic thickening of the esophagus.Unchanged lesion at T10  -08/19/2022: Surveillance CT chest with contrast with stable findings in comparison to prior.     Molecular markers:   -EGFR mutation negative; PD-L1 negative (TPS 0%); KRAS mutation negative;   -ALK gene rearrangement negative; ROS1 gene rearrangement negative; NTRK NGS fusion panel negative   -BRAF mutation negative; RET gene rearrangement not detected     2.) Microcytic anemia (relative iron deficiency +/- anemia of chronic disease/malignancy):   -Hemoglobin 6.8-9.3   -MCV 79.7   -MCH, MCHC low   -Stool for occult blood negative x2 (09/2021)   -B12 normal, 755 (09/06/2021)   -Serum iron 9, TIBC 157, transferrin saturation 6%, ferritin 795.5 (09/06/2021)   -Folate low, 6.5 (09/06/2021   -09/30/2021: Serum iron 24, TIBC 220, transferrin saturation 11%, ferritin 714.65 (anemia of chronic disease/relative iron deficiency); B12 level 1046; RBC folate 1147; hemoglobin 10.5     3.) Hypercalcemia of malignancy:   Calcium level:   -08/06/2021: 10.3 (albumin  2.5)   -9.6-10.2 between 08/07/2021-09/08/2021   -09/09/2021: 10.5 (albumin 1.8)   -09/12/2021: 10.6 (albumin 2.2)   -09/21/2021: 8.2 (albumin 2.1)   -08/07/2021: Intact PTH level normal, 12.7; intact PTH level <2.0   -09/10/2021: PTH needed peptide <2.0   -Zometa 4 mg IV x1 (09/12/2021) >> hypercalcemia resolved    -09/30/2021: Ionized calcium level 5.1, normal (Serum calcium 9.3, albumin 2.6, corrected calcium 10.42)  -stable      4.) Thrombocytosis:   (Subsequently, spontaneously resolved)   -Platelet count 413-588K   -Almost certainly, secondary to underlying malignancy and relative iron deficiency   -09/30/2021: ELDON-2 mutation negative   -10/08/2021: BCR-ABL1 1 fusion transcripts negative     5.) Hyponatremia secondary to SIADH:   -Treated with fluid restriction, salt tablets (by nephrology)        Plan:  -Continue consolidation immunotherapy with durvalumab 1500 mg IV every 4 weeks for up to 12 months, pending analysis of right pleural fluid  -Check TSH level every 2 months     Consolidation immunotherapy for patients with unresectable stage II/III NSCLC, PS 0-1, and no disease progression after definitive concurrent chemoradiation  Durvalumab 10 mg/kg IV every 2 weeks or 1500 mg every 4 weeks for up to 12 months (patients with a body weight of 30 kg or > 30 kg) (category 1 for stage III; category 2A for stage II)  This maximizes disease-free survival, 12 month progression free survival, 18 month progression free survival, higher response rate, longer median duration of response, and longer median time to death or distant metastasis.     Initiate surveillance for stage III non-small cell lung cancer, treated with chemoradiation therapy  -Completed treatment 12/2021  -History and physical and chest CT +/- contrast every 3 months for 3 years (12/2021-12/2024), then every 6 months for 2 years, then history and physical and low-dose noncontrast enhanced chest CT annually   >>>  -Repeat chest CT with contrast for  surveillance in 2 - 3 months (mid Nov - Dec 2022)      Hyponatremia secondary to SIADH secondary to malignancy  -following with nephrology    -stable     -Reviewed /discussed labs; discussed also relatively stable findings on CT chest from 8/19/22; continue on Durvalumab  -Clinically ok to proceed with treatment tomorrow, 9/9/22 as planned  -RTC for labs, visit, treatment in 4 weeks          Surveillance:  Discussion: Surveillance for stage I-II non-small cell lung cancer (primary treatment included radiotherapy) or stage III or stage IV (oligo metastatic with all sites treated with definitive intent):      As per NCCN guidelines, history and physical and chest CT with or without contrast every 3-6 months for 3 years; then history and physical and chest CT with or without contrast every 6 months for 2 years; then history and physical and a low-dose noncontrast enhanced chest CT annually.  Residual or new radiographic abnormalities may require more frequent imaging.  PET CT scan or brain MRI scan not routinely indicated.  However, many benign conditions like atelectasis/consolidation/radiation fibrosis are difficult to differentiate from neoplasm on standard CT imaging, and PET CT scan can be used to differentiate to malignancy in these settings.  However, if PET CT scan is to be used as a problem solving tool in patients of radiation therapy, then histopathologic confirmation of recurrent disease is needed because it area as previously treated with radiation therapy can remain FDG avid for up to 2 years.

## 2022-09-09 ENCOUNTER — INFUSION (OUTPATIENT)
Dept: INFUSION THERAPY | Facility: HOSPITAL | Age: 47
End: 2022-09-09
Attending: INTERNAL MEDICINE
Payer: MEDICAID

## 2022-09-09 VITALS
BODY MASS INDEX: 20.42 KG/M2 | RESPIRATION RATE: 20 BRPM | TEMPERATURE: 98 F | OXYGEN SATURATION: 100 % | DIASTOLIC BLOOD PRESSURE: 87 MMHG | HEIGHT: 65 IN | SYSTOLIC BLOOD PRESSURE: 121 MMHG | WEIGHT: 122.56 LBS | HEART RATE: 70 BPM

## 2022-09-09 DIAGNOSIS — C34.90 SQUAMOUS CELL CARCINOMA OF LUNG, UNSPECIFIED LATERALITY: Primary | ICD-10-CM

## 2022-09-09 PROCEDURE — A4216 STERILE WATER/SALINE, 10 ML: HCPCS | Performed by: NURSE PRACTITIONER

## 2022-09-09 PROCEDURE — 63600175 PHARM REV CODE 636 W HCPCS: Mod: JG | Performed by: NURSE PRACTITIONER

## 2022-09-09 PROCEDURE — 96413 CHEMO IV INFUSION 1 HR: CPT

## 2022-09-09 PROCEDURE — 25000003 PHARM REV CODE 250: Performed by: NURSE PRACTITIONER

## 2022-09-09 RX ORDER — DIPHENHYDRAMINE HYDROCHLORIDE 50 MG/ML
50 INJECTION INTRAMUSCULAR; INTRAVENOUS ONCE AS NEEDED
Status: CANCELLED | OUTPATIENT
Start: 2022-09-09

## 2022-09-09 RX ORDER — SODIUM CHLORIDE 0.9 % (FLUSH) 0.9 %
10 SYRINGE (ML) INJECTION
Status: CANCELLED | OUTPATIENT
Start: 2022-09-09

## 2022-09-09 RX ORDER — SODIUM CHLORIDE 0.9 % (FLUSH) 0.9 %
10 SYRINGE (ML) INJECTION
Status: DISCONTINUED | OUTPATIENT
Start: 2022-09-09 | End: 2022-09-09 | Stop reason: HOSPADM

## 2022-09-09 RX ORDER — HEPARIN 100 UNIT/ML
500 SYRINGE INTRAVENOUS
Status: DISCONTINUED | OUTPATIENT
Start: 2022-09-09 | End: 2022-09-09 | Stop reason: HOSPADM

## 2022-09-09 RX ORDER — EPINEPHRINE 0.3 MG/.3ML
0.3 INJECTION SUBCUTANEOUS ONCE AS NEEDED
Status: CANCELLED | OUTPATIENT
Start: 2022-09-09

## 2022-09-09 RX ORDER — HEPARIN 100 UNIT/ML
500 SYRINGE INTRAVENOUS
Status: CANCELLED | OUTPATIENT
Start: 2022-09-09

## 2022-09-09 RX ORDER — EPINEPHRINE 0.3 MG/.3ML
0.3 INJECTION SUBCUTANEOUS ONCE AS NEEDED
Status: DISCONTINUED | OUTPATIENT
Start: 2022-09-09 | End: 2022-09-09 | Stop reason: HOSPADM

## 2022-09-09 RX ORDER — DIPHENHYDRAMINE HYDROCHLORIDE 50 MG/ML
50 INJECTION INTRAMUSCULAR; INTRAVENOUS ONCE AS NEEDED
Status: DISCONTINUED | OUTPATIENT
Start: 2022-09-09 | End: 2022-09-09 | Stop reason: HOSPADM

## 2022-09-09 RX ADMIN — DURVALUMAB 1500 MG: 500 INJECTION, SOLUTION INTRAVENOUS at 08:09

## 2022-09-09 RX ADMIN — Medication 10 ML: at 10:09

## 2022-09-09 RX ADMIN — SODIUM CHLORIDE: 9 INJECTION, SOLUTION INTRAVENOUS at 08:09

## 2022-09-09 RX ADMIN — Medication 500 UNITS: at 10:09

## 2022-09-09 NOTE — NURSING
Pt did labwork and saw provider yesterday.  Pt here for C 9 imfinzi.  Denies any pain or complaints.

## 2022-09-29 ENCOUNTER — TELEPHONE (OUTPATIENT)
Dept: HEMATOLOGY/ONCOLOGY | Facility: CLINIC | Age: 47
End: 2022-09-29
Payer: MEDICAID

## 2022-10-05 RX ORDER — HEPARIN 100 UNIT/ML
500 SYRINGE INTRAVENOUS
Status: CANCELLED | OUTPATIENT
Start: 2022-10-07

## 2022-10-05 RX ORDER — SODIUM CHLORIDE 0.9 % (FLUSH) 0.9 %
10 SYRINGE (ML) INJECTION
Status: CANCELLED | OUTPATIENT
Start: 2022-10-07

## 2022-10-05 RX ORDER — EPINEPHRINE 0.3 MG/.3ML
0.3 INJECTION SUBCUTANEOUS ONCE AS NEEDED
Status: CANCELLED | OUTPATIENT
Start: 2022-10-07

## 2022-10-05 RX ORDER — HEPARIN 100 UNIT/ML
500 SYRINGE INTRAVENOUS
Status: CANCELLED | OUTPATIENT
Start: 2022-11-04

## 2022-10-05 RX ORDER — SODIUM CHLORIDE 0.9 % (FLUSH) 0.9 %
10 SYRINGE (ML) INJECTION
Status: CANCELLED | OUTPATIENT
Start: 2022-11-04

## 2022-10-05 RX ORDER — EPINEPHRINE 0.3 MG/.3ML
0.3 INJECTION SUBCUTANEOUS ONCE AS NEEDED
Status: CANCELLED | OUTPATIENT
Start: 2022-11-04

## 2022-10-05 RX ORDER — DIPHENHYDRAMINE HYDROCHLORIDE 50 MG/ML
50 INJECTION INTRAMUSCULAR; INTRAVENOUS ONCE AS NEEDED
Status: CANCELLED | OUTPATIENT
Start: 2022-11-04

## 2022-10-05 RX ORDER — DIPHENHYDRAMINE HYDROCHLORIDE 50 MG/ML
50 INJECTION INTRAMUSCULAR; INTRAVENOUS ONCE AS NEEDED
Status: CANCELLED | OUTPATIENT
Start: 2022-10-07

## 2022-10-06 ENCOUNTER — OFFICE VISIT (OUTPATIENT)
Dept: HEMATOLOGY/ONCOLOGY | Facility: CLINIC | Age: 47
End: 2022-10-06
Payer: MEDICAID

## 2022-10-06 VITALS
HEIGHT: 66 IN | DIASTOLIC BLOOD PRESSURE: 86 MMHG | OXYGEN SATURATION: 100 % | HEART RATE: 80 BPM | TEMPERATURE: 98 F | RESPIRATION RATE: 20 BRPM | WEIGHT: 123.69 LBS | BODY MASS INDEX: 19.88 KG/M2 | SYSTOLIC BLOOD PRESSURE: 136 MMHG

## 2022-10-06 DIAGNOSIS — C34.90 MALIGNANT NEOPLASM OF UNSPECIFIED PART OF UNSPECIFIED BRONCHUS OR LUNG: ICD-10-CM

## 2022-10-06 DIAGNOSIS — D50.8 OTHER IRON DEFICIENCY ANEMIA: Chronic | ICD-10-CM

## 2022-10-06 DIAGNOSIS — C34.90 SQUAMOUS CELL CARCINOMA OF LUNG, UNSPECIFIED LATERALITY: Primary | Chronic | ICD-10-CM

## 2022-10-06 DIAGNOSIS — E22.2 SIADH (SYNDROME OF INAPPROPRIATE ADH PRODUCTION): Chronic | ICD-10-CM

## 2022-10-06 DIAGNOSIS — E83.52 HYPERCALCEMIA: Chronic | ICD-10-CM

## 2022-10-06 DIAGNOSIS — J90 PLEURAL EFFUSION ON RIGHT: Chronic | ICD-10-CM

## 2022-10-06 LAB
ALBUMIN SERPL-MCNC: 4 GM/DL (ref 3.5–5)
ALBUMIN/GLOB SERPL: 1.1 RATIO (ref 1.1–2)
ALP SERPL-CCNC: 50 UNIT/L (ref 40–150)
ALT SERPL-CCNC: <5 UNIT/L (ref 0–55)
AST SERPL-CCNC: 12 UNIT/L (ref 5–34)
BASOPHILS # BLD AUTO: 0.04 X10(3)/MCL (ref 0–0.2)
BASOPHILS NFR BLD AUTO: 0.6 %
BILIRUBIN DIRECT+TOT PNL SERPL-MCNC: 0.3 MG/DL
BUN SERPL-MCNC: 22.3 MG/DL (ref 8.9–20.6)
CALCIUM SERPL-MCNC: 9.8 MG/DL (ref 8.4–10.2)
CHLORIDE SERPL-SCNC: 104 MMOL/L (ref 98–107)
CO2 SERPL-SCNC: 29 MMOL/L (ref 22–29)
CREAT SERPL-MCNC: 0.94 MG/DL (ref 0.73–1.18)
EOSINOPHIL # BLD AUTO: 0.16 X10(3)/MCL (ref 0–0.9)
EOSINOPHIL NFR BLD AUTO: 2.2 %
ERYTHROCYTE [DISTWIDTH] IN BLOOD BY AUTOMATED COUNT: 13.4 % (ref 11.5–17)
GFR SERPLBLD CREATININE-BSD FMLA CKD-EPI: >60 MLS/MIN/1.73/M2
GLOBULIN SER-MCNC: 3.5 GM/DL (ref 2.4–3.5)
GLUCOSE SERPL-MCNC: 81 MG/DL (ref 74–100)
HCT VFR BLD AUTO: 41.7 % (ref 42–52)
HGB BLD-MCNC: 13.9 GM/DL (ref 14–18)
IMM GRANULOCYTES # BLD AUTO: 0.02 X10(3)/MCL (ref 0–0.04)
IMM GRANULOCYTES NFR BLD AUTO: 0.3 %
LYMPHOCYTES # BLD AUTO: 1.31 X10(3)/MCL (ref 0.6–4.6)
LYMPHOCYTES NFR BLD AUTO: 18.3 %
MCH RBC QN AUTO: 29.5 PG (ref 27–31)
MCHC RBC AUTO-ENTMCNC: 33.3 MG/DL (ref 33–36)
MCV RBC AUTO: 88.5 FL (ref 80–94)
MONOCYTES # BLD AUTO: 0.5 X10(3)/MCL (ref 0.1–1.3)
MONOCYTES NFR BLD AUTO: 7 %
NEUTROPHILS # BLD AUTO: 5.1 X10(3)/MCL (ref 2.1–9.2)
NEUTROPHILS NFR BLD AUTO: 71.6 %
NRBC BLD AUTO-RTO: 0 %
PLATELET # BLD AUTO: 190 X10(3)/MCL (ref 130–400)
PMV BLD AUTO: 9.5 FL (ref 7.4–10.4)
POTASSIUM SERPL-SCNC: 4.8 MMOL/L (ref 3.5–5.1)
PROT SERPL-MCNC: 7.5 GM/DL (ref 6.4–8.3)
RBC # BLD AUTO: 4.71 X10(6)/MCL (ref 4.7–6.1)
SODIUM SERPL-SCNC: 140 MMOL/L (ref 136–145)
WBC # SPEC AUTO: 7.1 X10(3)/MCL (ref 4.5–11.5)

## 2022-10-06 PROCEDURE — 3075F PR MOST RECENT SYSTOLIC BLOOD PRESS GE 130-139MM HG: ICD-10-PCS | Mod: CPTII,,, | Performed by: INTERNAL MEDICINE

## 2022-10-06 PROCEDURE — 99214 OFFICE O/P EST MOD 30 MIN: CPT | Mod: S$PBB,,, | Performed by: INTERNAL MEDICINE

## 2022-10-06 PROCEDURE — 3008F BODY MASS INDEX DOCD: CPT | Mod: CPTII,,, | Performed by: INTERNAL MEDICINE

## 2022-10-06 PROCEDURE — 3079F DIAST BP 80-89 MM HG: CPT | Mod: CPTII,,, | Performed by: INTERNAL MEDICINE

## 2022-10-06 PROCEDURE — 99214 PR OFFICE/OUTPT VISIT, EST, LEVL IV, 30-39 MIN: ICD-10-PCS | Mod: S$PBB,,, | Performed by: INTERNAL MEDICINE

## 2022-10-06 PROCEDURE — 3075F SYST BP GE 130 - 139MM HG: CPT | Mod: CPTII,,, | Performed by: INTERNAL MEDICINE

## 2022-10-06 PROCEDURE — 1159F MED LIST DOCD IN RCRD: CPT | Mod: CPTII,,, | Performed by: INTERNAL MEDICINE

## 2022-10-06 PROCEDURE — 3008F PR BODY MASS INDEX (BMI) DOCUMENTED: ICD-10-PCS | Mod: CPTII,,, | Performed by: INTERNAL MEDICINE

## 2022-10-06 PROCEDURE — 1160F RVW MEDS BY RX/DR IN RCRD: CPT | Mod: CPTII,,, | Performed by: INTERNAL MEDICINE

## 2022-10-06 PROCEDURE — 3079F PR MOST RECENT DIASTOLIC BLOOD PRESSURE 80-89 MM HG: ICD-10-PCS | Mod: CPTII,,, | Performed by: INTERNAL MEDICINE

## 2022-10-06 PROCEDURE — 1160F PR REVIEW ALL MEDS BY PRESCRIBER/CLIN PHARMACIST DOCUMENTED: ICD-10-PCS | Mod: CPTII,,, | Performed by: INTERNAL MEDICINE

## 2022-10-06 PROCEDURE — 99213 OFFICE O/P EST LOW 20 MIN: CPT | Mod: PBBFAC | Performed by: INTERNAL MEDICINE

## 2022-10-06 PROCEDURE — 1159F PR MEDICATION LIST DOCUMENTED IN MEDICAL RECORD: ICD-10-PCS | Mod: CPTII,,, | Performed by: INTERNAL MEDICINE

## 2022-10-06 NOTE — Clinical Note
Orders for today:   Continue durvalumab every 4 weeks for a total of 12 months  Check TSH every 2 months; next, 1st week of November Repeat contrast enhanced CT chest for surveillance mid November  Follow-up in 1 month, with CT chest with contrast, CBC, CMP.

## 2022-10-06 NOTE — PROGRESS NOTES
Past medical history: Attention deficit disorder. Tobacco abuse. Tonsillectomy.  Social history: .  Lives in [place], [place].  Has 2 children.  Has worked as a  all his life (more than 30-35 years).  Smoked 1-1/2 packs of cigarettes daily for 15 years; then, 1 pack daily for 1 year; now, for last 2 months, 2 cigarettes daily.  Heavy alcohol use in the past; 8-10 beers daily for 10 years; quit 1-1/2 years ago.  No illicit drugs.  Family history: No family members with cancer.  Health maintenance: So far, does not have a primary care physician.  Apparently, he is going to see a primary care physician for the first time next week.      Reason for follow-up:   -Squamous cell carcinoma of lung, stage III   -Microcytic anemia (relative iron deficiency)   -Hypercalcemia of malignancy   -Thrombocytosis   -Hyponatremia (SIADH)         History of present illness:   46-year-old , smoker, presented to urgent care 08/06/2021 with complaint of shortness of breath, weakness, and 20 pound unintentional weight loss over last 1 year. Productive cough. White sputum. Night sweats and fever. Was referred to ED at [facility] for further work-up. Noted to have hypercalcemia, hyponatremia, and neutrophilic leukocytosis. Work-up showed large heterogeneous mediastinal mass and right lower lobe mass.    Oncologic history:     #Squamous cell carcinoma of lung:   -Bronchoscopy (08/18/2021).  CT chest with contrast (08/06/2021).  CT A/P without contrast (08/06/2021.  Brain MRI (09/10/2021).  Bone scan (09/11/2021).   -10.0 x 6.8 x 7.0 cm subcarinal mass, compressive effect upon right mainstem bronchus and right lower lobe pulmonary artery branches, complete obliteration of right lower lobe bronchi, partial narrowing of the main pulmonary artery   -Right lower lung lobe large masslike consolidation with central cavitations, without clear interface between the right lower lung lobe mass consolidation and  the mediastinal mass   -On bronchoscopy, large fungating mass at maximiliano, obliterating the right mainstem bronchus and partially occluding left mainstem   -No metastasis on CTs, bone scan, brain MRI   -Causing postobstructive pneumonia and hospitalization   -S/p laser tumor debulking at [facility] 08/27/2021   -Causing hyponatremia secondary to SIADH and hypercalcemia   -Semiurgently, started on radiation therapy, pending concurrent chemotherapy, given complication of right mainstem bronchus, complete obliteration of right lower lobe bronchi, causing postobstructive pneumonia, as well as to prevent precipitous respiratory insufficiency (subsequently, with good subjective and objective response)   >>   -Tumor >7 cm; tumor invading mediastinum; therefore, cT4   -Mediastinal mass, malignant gosia complex versus extension of right lower lobe mass, therefore, cN2 or cN3   -M0   >>> Stage IIIB or stage IIIC   -Completed 2 weeks of hypofractionated palliative radiation therapy (09/13/2021-09/30/2021); exertional dyspnea improved)   (Excellent subjective and objective response to radiotherapy)   (Did not receive concurrent chemotherapy with RT)   -25 pound weight loss over 1 year, including 15 pound weight loss in last 3 months; anorexic   -09/20/2021: ECOG 2; exertional dyspnea has improved with palliative radiation therapy (completed 2 weeks of hypofractionated radiotherapy 09/30/2021)   -09/30/2021: PET/CT (comparison: CT C/A/P 08/06/2021): Hypermetabolic subcarinal mass with FDG avid soft tissue extending to the right hilar region (5 cm subcarinal mass, extends along the right mainstem bronchus and right upper lobe bronchus with the right perihilar region); possible 8 mm FDG avid lymph node anterior to the right mainstem bronchus; no distant sites of FDG avid metastatic disease; multifocal right mid and lower lung consolidation with no hypermetabolic component appreciated; small to moderate right pleural effusion  (PET/CT:  Subcarinal mass 5 cm, was >7 cm to start with, thus, shrunk with radiation treatment; 8 mm mediastinal lymph node; no distant metastasis)   -BRAF mutation negative   -RET gene rearrangement not detected   -10/11/2021: Left-sided Mediport placed   -Sequential chemotherapy: Cisplatin/docetaxel (every 3 weeks x4 cycles) (10/18/2021-12/22/2021)   -11/17/2021: CT A/P with contrast (epigastric pain): (Comparison: 08/06/2021): Constipation   -12/27/2021: Restaging CT C/A/P with contrast (comparison: 09/30/2021): Significantly improved subcarinal/right hilar soft tissue; improved irregular right perihilar opacities; trace right pleural effusion   -Consolidation durvalumab started 01/25/2022 (every 4 weeks)   -03/18/2022: Surveillance chest CT with contrast (comparison: 12/27/2021): Moderate right pleural effusion, larger than before; increased irregular right perihilar opacities particularly anteriorly, may be treatment related; infectious/inflammatory patchy groundglass opacities left lower lung lobe   -01/25/2022: TSH and free T4 normal  -03/22/2022: TSH, free T4 normal  -04/05/2022: CT A/P with contrast (comparison: 12/27/2021): Right-sided pleural effusion that appears loculated; stable left renal cyst  -04/05/2022: Whole-body nuclear medicine bone scan (comparison: Bone scan 09/11/2021): No bone metastasis  -Cycle #4 of consolidation durvalumab on 04/19/2022  -04/22/2022: Right thoracentesis: 40 cc, yellow, clear fluid drained (albumin 3.3; amylase 57; glucose 103; ; protein 5.4)  -s/p maintenance durvalumab 1500 mg IV 06/14/2022 (cycle 6)  -06/16/2022:  Surveillance chest CT without contrast (comparison:  CT chest 03/18/2022):  1. Chronic soft tissue attenuation with loss of normal fat planes in the subcarinal and right hilar region with bronchial wall thickening, narrowing of the right mainstem bronchus and occlusion of the right upper lobe bronchus.  This is similar to prior exam.  2. Perihilar and  peribronchovascular opacities extending into the right upper lobe are again seen, slightly improved compared to the previous exam.  3. Moderate right pleural effusion  4. Chronic thickening of the esophagus.  5. Unchanged lesion at T10  -06/13/2022:  TSH and free T4 normal  -08/11/2022:  Iron stores appear normal) serum iron 62, TIBC 259, transferrin saturation 24%); B12 level 673; folate level 7.0; hemoglobin 14.3  -08/19/2022: CT chest with contrast (comparison:  06/16/2022):  Right lung demonstrates severe upper lobe apical bullous disease, multiple areas of chronic scarring, volume loss, parenchymal architectural distortion most significant in the right upper lobe, moderate pleural effusion, generalized volume loss with leftward mediastinal shift.  No interval change from the prior exam.  -09/08/2022: TSH, free T4 normal  -cycle 9 of consolidation durvalumab, every 4 weeks, on 09/09/2022 09/20/2021:   Presents for initial medical oncology consultation, accompanied by his wife.  Very pleasant gentleman.  In no acute discomfort.  Looks chronically ill.   -Rates his general health as 7 on a scale of 1-10, if 10 could be the best.    -Cough.  Bad.  Yellow phlegm.  No hemoptysis.  Comes in fits.  Smoked for several years; for last 2 months, 2 cigarettes daily.  Exertional dyspnea has improved with radiation treatment.   -Today, will finish 2 weeks of hypofractionated palliative radiation therapy; dyspnea has improved with that.   -Fatigue.  ECOG 2.  Had to stop working in August when he could not work anymore because of progressive dyspnea and fatigue.   -Weight loss.  Has lost 35 pounds in last 1 year, including 15 pounds in last 3 months.  Appetite is so-so.  We will start Megace.   -No unusual headaches, focal neuro symptoms, chest pain, hemoptysis, abdominal pain, nausea, vomiting, GI bleeding, et    Interval history    10/06/2022:  -08/11/2022:  Iron stores appear normal) serum iron 62, TIBC 259,  transferrin saturation 24%); B12 level 673; folate level 7.0; hemoglobin 14.3  -08/19/2022: CT chest with contrast (comparison:  06/16/2022):  Right lung demonstrates severe upper lobe apical bullous disease, multiple areas of chronic scarring, volume loss, parenchymal architectural distortion most significant in the right upper lobe, moderate pleural effusion, generalized volume loss with leftward mediastinal shift.  No interval change from the prior exam.  -09/08/2022: TSH, free T4 normal  -cycle 9 of consolidation durvalumab, every 4 weeks, on 09/09/2022  Presents for follow-up visit.  Doing well.  Stopped smoking 08/27/2021.  Stamina keeps improving.  Good appetite.  Baseline mild-to-moderate exertional dyspnea but stable and not severe.  He has gone back to part-time work.  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  Working keeps him busy and makes him more energetic.  No hemoptysis, fevers, chills, unusual headaches, focal neurological symptoms, anorexia, any new lumps or lymphadenopathy, etc..  No side effects with ongoing consolidation immunotherapy with durvalumab.       Review of systems:   All systems reviewed, and found to be negative except for the symptoms detailed above.        Physical examination:   VITAL SIGNS:  Reviewed.       GENERAL:  In no apparent distress.     HEAD:  No signs of head trauma.   EYES:  Pupils are equal.  Extraocular motions intact.     EARS:  Hearing grossly intact.   MOUTH:  Oropharynx is normal.    NECK:  No adenopathy, no JVD.      CHEST:  Chest with clear breath sounds bilaterally.  No wheezes, rales, or rhonchi.     CARDIAC:  Regular rate and rhythm.  S1 and S2, without murmurs, gallops, or rubs.   VASCULAR:  No Edema.  Peripheral pulses normal and equal in all extremities.   ABDOMEN:  Soft, without detectable tenderness.  No sign of distention.  No   rebound or guarding, and no masses palpated.   Bowel Sounds normal.   MUSCULOSKELETAL:   Good range of motion of all major joints. Extremities without clubbing, cyanosis or edema.     NEUROLOGIC EXAM:  Alert and oriented x 3.  No focal sensory or strength deficits.   Speech normal.  Follows commands.   PSYCHIATRIC:  Mood normal.SKIN:  No rash or lesions.  09/30/2021: In no acute discomfort.  Looks chronically ill.  Digital clubbing noted.  Breath sounds diminished right lung base.  Rhonchi left lung field.  No palpable lymphadenopathy.      Assessment:   Locally advanced, obviously inoperable squamous cell carcinoma of lung;   large, 10 cm mediastinal mass, possibly arising in the right lower lung lobe, causing compressive effects on right mainstem bronchus and right pulmonary artery;   no metastasis;   s/p bronchoscopic biopsy of carinal mass 08/18/2021;   large fungating mass at maximiliano, obliterating the right mainstem bronchus and partially occluding left mainstem;   causing hyponatremia, hypercalcemia;   worsening dyspnea, fever, postobstructive pneumonia    # squamous cell carcinoma of lung:  To summarize:  -bronchoscopy 08/18/2021  -10 cm subcarinal mass with compressive effects, right lower lung lobe large masslike consolidation with central cavitation, large fungating mass at maximiliano, obliterating the right mainstem bronchus and partially occluding left mainstem  -stage IIIB or IIIC  -postobstructive pneumonia and hospitalization  -S/P laser tumor debulking at [facility] 08/27/2021  -hyponatremia secondary to SIADH  -hypercalcemia  -S/P palliative radiotherapy 09/2021, pending definitive concurrent chemoradiation therapy, with subjective and objective improvement  -did not receive chemotherapy concurrent with radiotherapy  -Followed by sequential chemotherapy with cisplatin/docetaxel q3 weeks x4 cycles (10/2021-12/2021)  -with considerable improvement  -consolidation durvalumab started 01/25/2022) q4 weeks)  -no recurrence or metastasis on surveillance CT chest with contrast 08/19/2022      #Molecular  markers:   -EGFR mutation negative; PD-L1 negative (TPS 0%); KRAS mutation negative;   -ALK gene rearrangement negative; ROS1 gene rearrangement negative; NTRK NGS fusion panel negative   -BRAF mutation negative; RET gene rearrangement not detected          #Microcytic anemia (relative iron deficiency +/- anemia of chronic disease/malignancy):   -Hemoglobin 6.8-9.3   -MCV 79.7   -MCH, MCHC low   -Stool for occult blood negative x2 (09/2021)   -B12 normal, 755 (09/06/2021)   -Serum iron 9, TIBC 157, transferrin saturation 6%, ferritin 795.5 (09/06/2021)   -Folate low, 6.5 (09/06/2021   -09/30/2021: Serum iron 24, TIBC 220, transferrin saturation 11%, ferritin 714.65 (anemia of chronic disease/relative iron deficiency); B12 level 1046; RBC folate 1147; hemoglobin 10.5          #Hypercalcemia of malignancy:   Calcium level:   -08/06/2021: 10.3 (albumin 2.5)   -9.6-10.2 between 08/07/2021-09/08/2021   -09/09/2021: 10.5 (albumin 1.8)   -09/12/2021: 10.6 (albumin 2.2)   -09/21/2021: 8.2 (albumin 2.1)   -08/07/2021: Intact PTH level normal, 12.7; intact PTH level <2.0   -09/10/2021: PTH needed peptide <2.0   -Zometa 4 mg IV x1 (09/12/2021) >> hypercalcemia resolved    -09/30/2021: Ionized calcium level 5.1, normal (Serum calcium 9.3, albumin 2.6, corrected calcium 10.42)          #Thrombocytosis:   (Subsequently, spontaneously resolved)   -Platelet count 413-588K   -Almost certainly, secondary to underlying malignancy and relative iron deficiency   -09/30/2021: ELDON-2 mutation negative   -10/08/2021: BCR-ABL1 1 fusion transcripts negative          #Hyponatremia secondary to SIADH:   -Sodium 128-130   -Treated with fluid restriction, salt tablets (by nephrology)      Plan:  -no recurrence or metastases on surveillance CT chest with contrast 08/19/2022   >>>  -Continue consolidation immunotherapy with durvalumab 1500 mg IV every 4 weeks for up to 12 months  -Check TSH level every 2 months (next, 1st week of November)      Consolidation immunotherapy for patients with unresectable stage II/III NSCLC, PS 0-1, and no disease progression after definitive concurrent chemoradiation   Durvalumab 10 mg/kg IV every 2 weeks or 1500 mg every 4 weeks for up to 12 months (patients with a body weight of 30 kg or > 30 kg) (category 1 for stage III; category 2A for stage II)   This maximizes disease-free survival, 12 month progression free survival, 18 month progression free survival, higher response rate, longer median duration of response, and longer median time to death or distant metastasis.     Initiate surveillance for stage III non-small cell lung cancer, treated with chemoradiation therapy   -Completed treatment 12/2021   -History and physical and chest CT +/- contrast every 3 months for 3 years (12/2021-12/2024), then every 6 months for 2 years, then history and physical and low-dose noncontrast enhanced chest CT annually   >>>  -repeat contrast enhanced chest CT for surveillance in 3 months (next, mid November)     -Microcytic anemia   -Anemia of chronic disease/relative iron deficiency     Hypercalcemia of malignancy   -S/p Zometa 4 mg IV x1 (09/12/2021)     Thrombocytosis; reactive; subsequently, spontaneously resolved    Hyponatremia secondary to SIADH secondary to malignancy  -Managed by renal; continues to take salt tablets  -Continue fluid restriction, will monitor sodium level    Follow-up in 1 month, with CT chest with contrast, CBC, CMP.    Above discussed with him.  All questions answered.  Discussed labs and scans and gave him copies of relevant report.  He understands and agrees with this plan.   ---------------      Surveillance:   Discussion: Surveillance for stage I-II non-small cell lung cancer (primary treatment included radiotherapy) or stage III or stage IV (oligo metastatic with all sites treated with definitive intent):     As per NCCN guidelines, history and physical and chest CT with or without contrast every 3-6  months for 3 years; then history and physical and chest CT with or without contrast every 6 months for 2 years; then history and physical and a low-dose noncontrast enhanced chest CT annually.  Residual or new radiographic abnormalities may require more frequent imaging.  PET CT scan or brain MRI scan not routinely indicated.  However, many benign conditions like atelectasis/consolidation/radiation fibrosis are difficult to differentiate from neoplasm on standard CT imaging, and PET CT scan can be used to differentiate to malignancy in these settings.  However, if PET CT scan is to be used as a problem solving tool in patients of radiation therapy, then histopathologic confirmation of recurrent disease is needed because it area as previously treated with radiation therapy can remain FDG avid for up to 2 years.

## 2022-10-07 ENCOUNTER — INFUSION (OUTPATIENT)
Dept: INFUSION THERAPY | Facility: HOSPITAL | Age: 47
End: 2022-10-07
Attending: INTERNAL MEDICINE
Payer: MEDICAID

## 2022-10-07 VITALS
SYSTOLIC BLOOD PRESSURE: 132 MMHG | OXYGEN SATURATION: 99 % | DIASTOLIC BLOOD PRESSURE: 77 MMHG | BODY MASS INDEX: 20.72 KG/M2 | HEART RATE: 69 BPM | RESPIRATION RATE: 18 BRPM | TEMPERATURE: 98 F | HEIGHT: 65 IN | WEIGHT: 124.38 LBS

## 2022-10-07 DIAGNOSIS — C34.90 SQUAMOUS CELL CARCINOMA OF LUNG, UNSPECIFIED LATERALITY: Primary | ICD-10-CM

## 2022-10-07 PROCEDURE — 25000003 PHARM REV CODE 250: Performed by: INTERNAL MEDICINE

## 2022-10-07 PROCEDURE — 96413 CHEMO IV INFUSION 1 HR: CPT

## 2022-10-07 PROCEDURE — 63600175 PHARM REV CODE 636 W HCPCS: Mod: JG | Performed by: INTERNAL MEDICINE

## 2022-10-07 RX ORDER — HEPARIN 100 UNIT/ML
500 SYRINGE INTRAVENOUS
Status: DISCONTINUED | OUTPATIENT
Start: 2022-10-07 | End: 2022-10-07 | Stop reason: HOSPADM

## 2022-10-07 RX ORDER — SODIUM CHLORIDE 0.9 % (FLUSH) 0.9 %
10 SYRINGE (ML) INJECTION
Status: DISCONTINUED | OUTPATIENT
Start: 2022-10-07 | End: 2022-10-07 | Stop reason: HOSPADM

## 2022-10-07 RX ORDER — EPINEPHRINE 0.3 MG/.3ML
0.3 INJECTION SUBCUTANEOUS ONCE AS NEEDED
Status: DISCONTINUED | OUTPATIENT
Start: 2022-10-07 | End: 2022-10-07 | Stop reason: HOSPADM

## 2022-10-07 RX ORDER — DIPHENHYDRAMINE HYDROCHLORIDE 50 MG/ML
50 INJECTION INTRAMUSCULAR; INTRAVENOUS ONCE AS NEEDED
Status: DISCONTINUED | OUTPATIENT
Start: 2022-10-07 | End: 2022-10-07 | Stop reason: HOSPADM

## 2022-10-07 RX ADMIN — SODIUM CHLORIDE: 9 INJECTION, SOLUTION INTRAVENOUS at 08:10

## 2022-10-07 RX ADMIN — DURVALUMAB 1500 MG: 500 INJECTION, SOLUTION INTRAVENOUS at 09:10

## 2022-10-07 RX ADMIN — Medication 500 UNITS: at 10:10

## 2022-10-13 PROBLEM — D63.8 ANEMIA OF CHRONIC DISEASE: Chronic | Status: RESOLVED | Noted: 2022-05-15 | Resolved: 2022-10-13

## 2022-10-27 PROBLEM — E78.00 PURE HYPERCHOLESTEROLEMIA: Status: ACTIVE | Noted: 2022-10-27

## 2022-10-27 PROBLEM — E78.00 PURE HYPERCHOLESTEROLEMIA: Chronic | Status: ACTIVE | Noted: 2022-10-27

## 2022-11-06 RX ORDER — DIPHENHYDRAMINE HYDROCHLORIDE 50 MG/ML
50 INJECTION INTRAMUSCULAR; INTRAVENOUS ONCE AS NEEDED
Status: CANCELLED | OUTPATIENT
Start: 2022-12-06

## 2022-11-06 RX ORDER — SODIUM CHLORIDE 0.9 % (FLUSH) 0.9 %
10 SYRINGE (ML) INJECTION
Status: CANCELLED | OUTPATIENT
Start: 2022-12-06

## 2022-11-06 RX ORDER — HEPARIN 100 UNIT/ML
500 SYRINGE INTRAVENOUS
Status: CANCELLED | OUTPATIENT
Start: 2022-12-06

## 2022-11-06 RX ORDER — EPINEPHRINE 0.3 MG/.3ML
0.3 INJECTION SUBCUTANEOUS ONCE AS NEEDED
Status: CANCELLED | OUTPATIENT
Start: 2022-12-06

## 2022-11-06 NOTE — PROGRESS NOTES
History:  Past Medical History:   Diagnosis Date    Anemia of chronic disease 5/15/2022    Attention deficit hyperactivity disorder (ADHD), predominantly inattentive type 7/15/2022    Cachexia 7/15/2022    Depression with anxiety 7/15/2022    Hypercalcemia 5/15/2022    Iron deficiency anemia 5/15/2022    Lung cancer     Pleural effusion on right 2022    Pure hypercholesterolemia 10/27/2022    SIADH (syndrome of inappropriate ADH production) 5/15/2022    Squamous cell carcinoma of lung 3/21/2022    Tobacco user 7/15/2022   Past medical history: Attention deficit disorder. Tobacco abuse. Tonsillectomy.  Social history: .  Lives in [place], [place].  Has 2 children.  Has worked as a  all his life (more than 30-35 years).  Smoked 1-1/2 packs of cigarettes daily for 15 years; then, 1 pack daily for 1 year; now, for last 2 months, 2 cigarettes daily.  Heavy alcohol use in the past; 8-10 beers daily for 10 years; quit 1-1/2 years ago.  No illicit drugs.  Family history: No family members with cancer.  Health maintenance: So far, does not have a primary care physician.  Apparently, he is going to see a primary care physician for the first time next week.  Past Surgical History:   Procedure Laterality Date    Bronchoscopy w endobronchial biopsy  2021    Endobronchial ablation of left main stem occlusion  2021    MEDIPORT INSERTION, SINGLE  10/11/2021    TONSILLECTOMY  1981    US guided right thoracentesis Right 2022      Social History     Socioeconomic History    Marital status:    Tobacco Use    Smoking status: Former     Packs/day: 1.50     Types: Cigarettes     Start date: 1990     Quit date: 2021     Years since quittin.8    Smokeless tobacco: Never   Substance and Sexual Activity    Alcohol use: Yes    Drug use: Never    Sexual activity: Yes      Family History   Problem Relation Age of Onset    ALS Mother 40    Nephrolithiasis Father      Cholecystitis Father     Psychosis Sister     Diabetes type II Son         Reason for Follow-up:   -Squamous cell carcinoma of lung, stage III   -Microcytic anemia (relative iron deficiency)   -Hypercalcemia of malignancy   -Thrombocytosis   -Hyponatremia (SIADH)         History of Present Illness:   No chief complaint on file.        Oncologic/Hematologic History:  Oncology History   Squamous cell carcinoma of lung   1/25/2022 -  Chemotherapy    Treatment Summary   Plan Name: OP DURVALUMAB 1500 MG Q4W  Treatment Goal: Control  Status: Active  Start Date: 1/25/2022  End Date: 12/2/2022 (Planned)  Provider: Brendan Amaral MD  Chemotherapy: [No matching medication found in this treatment plan]       3/21/2022 Initial Diagnosis    Squamous cell carcinoma of lung     5/15/2022 Cancer Staged    Staging form: Lung, AJCC 8th Edition  - Clinical stage from 5/15/2022: Stage IIIB (cT4, cN2, cM0)        #Squamous cell carcinoma of lung:   -Bronchoscopy (08/18/2021).  CT chest with contrast (08/06/2021).  CT A/P without contrast (08/06/2021.  Brain MRI (09/10/2021).  Bone scan (09/11/2021).   -10.0 x 6.8 x 7.0 cm subcarinal mass, compressive effect upon right mainstem bronchus and right lower lobe pulmonary artery branches, complete obliteration of right lower lobe bronchi, partial narrowing of the main pulmonary artery   -Right lower lung lobe large masslike consolidation with central cavitations, without clear interface between the right lower lung lobe mass consolidation and the mediastinal mass   -On bronchoscopy, large fungating mass at maximiliano, obliterating the right mainstem bronchus and partially occluding left mainstem   -No metastasis on CTs, bone scan, brain MRI   -Causing postobstructive pneumonia and hospitalization   -S/p laser tumor debulking at [facility] 08/27/2021   -Causing hyponatremia secondary to SIADH and hypercalcemia   -Semiurgently, started on radiation therapy, pending concurrent chemotherapy, given  complication of right mainstem bronchus, complete obliteration of right lower lobe bronchi, causing postobstructive pneumonia, as well as to prevent precipitous respiratory insufficiency (subsequently, with good subjective and objective response)   >>   -Tumor >7 cm; tumor invading mediastinum; therefore, cT4   -Mediastinal mass, malignant gosia complex versus extension of right lower lobe mass, therefore, cN2 or cN3   -M0   >>> Stage IIIB or stage IIIC   -Completed 2 weeks of hypofractionated palliative radiation therapy (09/13/2021-09/30/2021); exertional dyspnea improved)   (Excellent subjective and objective response to radiotherapy)   (Did not receive concurrent chemotherapy with RT)   -25 pound weight loss over 1 year, including 15 pound weight loss in last 3 months; anorexic   -09/20/2021: ECOG 2; exertional dyspnea has improved with palliative radiation therapy (completed 2 weeks of hypofractionated radiotherapy 09/30/2021)   -09/30/2021: PET/CT (comparison: CT C/A/P 08/06/2021): Hypermetabolic subcarinal mass with FDG avid soft tissue extending to the right hilar region (5 cm subcarinal mass, extends along the right mainstem bronchus and right upper lobe bronchus with the right perihilar region); possible 8 mm FDG avid lymph node anterior to the right mainstem bronchus; no distant sites of FDG avid metastatic disease; multifocal right mid and lower lung consolidation with no hypermetabolic component appreciated; small to moderate right pleural effusion  (PET/CT: Subcarinal mass 5 cm, was >7 cm to start with, thus, shrunk with radiation treatment; 8 mm mediastinal lymph node; no distant metastasis)   -BRAF mutation negative   -RET gene rearrangement not detected   -10/11/2021: Left-sided Mediport placed   -Sequential chemotherapy: Cisplatin/docetaxel (every 3 weeks x4 cycles) (10/18/2021-12/22/2021)   -11/17/2021: CT A/P with contrast (epigastric pain): (Comparison: 08/06/2021): Constipation   -12/27/2021:  Restaging CT C/A/P with contrast (comparison: 09/30/2021): Significantly improved subcarinal/right hilar soft tissue; improved irregular right perihilar opacities; trace right pleural effusion   -Consolidation durvalumab started 01/25/2022 (every 4 weeks)   -03/18/2022: Surveillance chest CT with contrast (comparison: 12/27/2021): Moderate right pleural effusion, larger than before; increased irregular right perihilar opacities particularly anteriorly, may be treatment related; infectious/inflammatory patchy groundglass opacities left lower lung lobe   -01/25/2022: TSH and free T4 normal  -03/22/2022: TSH, free T4 normal  -04/05/2022: CT A/P with contrast (comparison: 12/27/2021): Right-sided pleural effusion that appears loculated; stable left renal cyst  -04/05/2022: Whole-body nuclear medicine bone scan (comparison: Bone scan 09/11/2021): No bone metastasis  -Cycle #4 of consolidation durvalumab on 04/19/2022  -04/22/2022: Right thoracentesis: 40 cc, yellow, clear fluid drained (albumin 3.3; amylase 57; glucose 103; ; protein 5.4)  -s/p maintenance durvalumab 1500 mg IV 06/14/2022 (cycle 6)  -06/16/2022:  Surveillance chest CT without contrast (comparison:  CT chest 03/18/2022):  1. Chronic soft tissue attenuation with loss of normal fat planes in the subcarinal and right hilar region with bronchial wall thickening, narrowing of the right mainstem bronchus and occlusion of the right upper lobe bronchus.  This is similar to prior exam.  2. Perihilar and peribronchovascular opacities extending into the right upper lobe are again seen, slightly improved compared to the previous exam.  3. Moderate right pleural effusion  4. Chronic thickening of the esophagus.  5. Unchanged lesion at T10  -06/13/2022:  TSH and free T4 normal  -08/11/2022:  Iron stores appear normal) serum iron 62, TIBC 259, transferrin saturation 24%); B12 level 673; folate level 7.0; hemoglobin 14.3  -08/19/2022: CT chest with contrast  (comparison:  06/16/2022):  Right lung demonstrates severe upper lobe apical bullous disease, multiple areas of chronic scarring, volume loss, parenchymal architectural distortion most significant in the right upper lobe, moderate pleural effusion, generalized volume loss with leftward mediastinal shift.  No interval change from the prior exam.  -09/08/2022: TSH, free T4 normal  -cycle 9 of consolidation durvalumab, every 4 weeks, on 09/09/2022  -11/07/2022:  Surveillance noncontrast chest CT (comparison:  08/19/2022):  No recurrence or metastasis        09/20/2021:   Presents for initial medical oncology consultation      Interval History:  [No matching plan found]   OP DURVALUMAB 1500 MG Q4W   11/08/2022:   -09/08/2022: TSH, free T4 normal  -11/07/2022:  Surveillance noncontrast chest CT (comparison:  08/19/2022):  No recurrence or metastasis  Presents for a follow-up visit, accompanied by a female family member.  Doing well.  No complaints.  Excellent appetite.  ECOG 1.  Says that he stopped smoking in August 2021.  No unusual cough, dyspnea, hemoptysis, recurrent fevers or chills, unusual headaches, focal neurological symptoms, weakness, fatigue, etc..  No immune related adverse events with durvalumab.    Medications:  Current Outpatient Medications on File Prior to Visit   Medication Sig Dispense Refill    busPIRone (BUSPAR) 7.5 MG tablet Take 1 tablet (7.5 mg total) by mouth 3 (three) times daily. 90 tablet 5    diclofenac sodium (VOLTAREN) 1 % Gel Apply 2 g topically 4 (four) times daily as needed (pain). Do not exceed 32 grams/day: do not to exceed 8 grams/day/single joint of upper extremities; do not to exceed 16 grams/day/single joint of lower extremities.  Please request refill of this medication from your PCP. (Patient not taking: Reported on 8/11/2022) 100 g 3    durvalumab (IMFINZI) 50 mg/mL Soln chemo injection Inject 10 mg/kg into the vein every 14 (fourteen) days.      EPINEPHrine (EPIPEN 2-SUSANA) 0.3  mg/0.3 mL AtIn Inject 0.3 mLs (0.3 mg total) into the muscle once. for 1 dose 0.3 mL 3    predniSONE (DELTASONE) 10 MG tablet Take 1 tablet (10 mg total) by mouth daily as needed (wasp sting). 10 tablet 4     No current facility-administered medications on file prior to visit.       Review of Systems:   All systems reviewed and found to be negative except for the symptoms detailed above    Physical Examination:   VITAL SIGNS: There were no vitals filed for this visit.  GENERAL:  In no apparent distress.    HEAD:  No signs of head trauma.  EYES:  Pupils are equal.  Extraocular motions intact.    EARS:  Hearing grossly intact.  MOUTH:  Oropharynx is normal.   NECK:  No adenopathy, no JVD.     CHEST:  Chest with clear breath sounds bilaterally.  No wheezes, rales, rhonchi.    CARDIAC:  Regular rate and rhythm.  S1 and S2, without murmurs, gallops, rubs.  VASCULAR:  No Edema.  Peripheral pulses normal and equal in all extremities.  ABDOMEN:  Soft, without detectable tenderness.  No sign of distention.  No   rebound or guarding, and no masses palpated.   Bowel Sounds normal.  MUSCULOSKELETAL:  Good range of motion of all major joints. Extremities without clubbing, cyanosis or edema.    NEUROLOGIC EXAM:  Alert and oriented x 3.  No focal sensory or strength deficits.   Speech normal.  Follows commands.  PSYCHIATRIC:  Mood normal.    No results for input(s): CBC in the last 72 hours.   No results for input(s): CMP in the last 72 hours.     Assessment:  Problem List Items Addressed This Visit    None      Locally advanced, obviously inoperable squamous cell carcinoma of lung;  large, 10 cm mediastinal mass, possibly arising in the right lower lung lobe, causing compressive effects on right mainstem bronchus and right pulmonary artery;  no metastasis;  s/p bronchoscopic biopsy of carinal mass 08/18/2021;  large fungating mass at maximiliano, obliterating the right mainstem bronchus and partially occluding left mainstem;  causing  hyponatremia, hypercalcemia;  worsening dyspnea, fever, postobstructive pneumonia     # squamous cell carcinoma of lung:  To summarize:  -bronchoscopy 08/18/2021  -10 cm subcarinal mass with compressive effects, right lower lung lobe large masslike consolidation with central cavitation, large fungating mass at maximiliano, obliterating the right mainstem bronchus and partially occluding left mainstem  -cT4 cN2 M0, stage IIIB  -postobstructive pneumonia and hospitalization  -S/P laser tumor debulking at [facility] 08/27/2021  -hyponatremia secondary to SIADH  -hypercalcemia  -S/P palliative radiotherapy 09/2021, pending definitive concurrent chemoradiation therapy, with subjective and objective improvement  -did not receive chemotherapy concurrent with radiotherapy  -Followed by sequential chemotherapy with cisplatin/docetaxel q3 weeks x4 cycles (10/2021-12/2021)  -with considerable improvement  -consolidation durvalumab started 01/25/2022 (q4 weeks)  -no recurrence or metastasis on surveillance CT chest with contrast 08/19/2022, 11/07/2022        #Molecular markers:   -EGFR mutation negative; PD-L1 negative (TPS 0%); KRAS mutation negative;   -ALK gene rearrangement negative; ROS1 gene rearrangement negative; NTRK NGS fusion panel negative   -BRAF mutation negative; RET gene rearrangement not detected          #Microcytic anemia (relative iron deficiency +/- anemia of chronic disease/malignancy):   -Hemoglobin 6.8-9.3   -MCV 79.7   -MCH, MCHC low   -Stool for occult blood negative x2 (09/2021)   -B12 normal, 755 (09/06/2021)   -Serum iron 9, TIBC 157, transferrin saturation 6%, ferritin 795.5 (09/06/2021)   -Folate low, 6.5 (09/06/2021   -09/30/2021: Serum iron 24, TIBC 220, transferrin saturation 11%, ferritin 714.65 (anemia of chronic disease/relative iron deficiency); B12 level 1046; RBC folate 1147; hemoglobin 10.5          #Hypercalcemia of malignancy:   Calcium level:   -08/06/2021: 10.3 (albumin 2.5)   -9.6-10.2  between 08/07/2021-09/08/2021   -09/09/2021: 10.5 (albumin 1.8)   -09/12/2021: 10.6 (albumin 2.2)   -09/21/2021: 8.2 (albumin 2.1)   -08/07/2021: Intact PTH level normal, 12.7; intact PTH level <2.0   -09/10/2021: PTH needed peptide <2.0   -Zometa 4 mg IV x1 (09/12/2021) >> hypercalcemia resolved    -09/30/2021: Ionized calcium level 5.1, normal (Serum calcium 9.3, albumin 2.6, corrected calcium 10.42)          #Thrombocytosis:   (Subsequently, spontaneously resolved)   -Platelet count 413-588K   -Almost certainly, secondary to underlying malignancy and relative iron deficiency   -09/30/2021: ELDON-2 mutation negative   -10/08/2021: BCR-ABL1 1 fusion transcripts negative          #Hyponatremia secondary to SIADH:   -Sodium 128-130   -Treated with fluid restriction, salt tablets (by nephrology)        Plan:  -no recurrence or metastases on surveillance CT chest with contrast 08/19/2022   >>>  -Continue consolidation immunotherapy with durvalumab 1500 mg IV every 4 weeks for up to 12 months  -Check TSH level every 2 months (next, 1st week of January)     Consolidation immunotherapy for patients with unresectable stage II/III NSCLC, PS 0-1, and no disease progression after definitive concurrent chemoradiation   Durvalumab 10 mg/kg IV every 2 weeks or 1500 mg every 4 weeks for up to 12 months (patients with a body weight of 30 kg or > 30 kg) (category 1 for stage III; category 2A for stage II)  This maximizes disease-free survival, 12 month progression free survival, 18 month progression free survival, higher response rate, longer median duration of response, and longer median time to death or distant metastasis.      Initiate surveillance for stage III non-small cell lung cancer, treated with chemoradiation therapy   -Completed treatment 12/2021   -History and physical and chest CT +/- contrast every 3 months for 3 years (12/2021-12/2024), then every 6 months for 2 years, then history and physical and low-dose  noncontrast enhanced chest CT annually   >>>  Repeat contrast enhanced chest CT for surveillance in 3 months (February 2023)      -Microcytic anemia   -Anemia of chronic disease/relative iron deficiency      Hypercalcemia of malignancy   -S/p Zometa 4 mg IV x1 (09/12/2021)      Thrombocytosis; reactive; subsequently, spontaneously resolved     Hyponatremia secondary to SIADH secondary to malignancy  -Managed by renal  -Continue fluid restriction, will monitor sodium level     Follow-up with NP in 1 month, with CBC and CMP     Above discussed with him.  All questions answered.  Discussed labs and scans and gave him copies of relevant report.  He understands and agrees with this plan.   ---------------        Surveillance:   Discussion: Surveillance for stage I-II non-small cell lung cancer (primary treatment included radiotherapy) or stage III or stage IV (oligo metastatic with all sites treated with definitive intent):      As per NCCN guidelines, history and physical and chest CT with or without contrast every 3-6 months for 3 years; then history and physical and chest CT with or without contrast every 6 months for 2 years; then history and physical and a low-dose noncontrast enhanced chest CT annually.  Residual or new radiographic abnormalities may require more frequent imaging.  PET CT scan or brain MRI scan not routinely indicated.  However, many benign conditions like atelectasis/consolidation/radiation fibrosis are difficult to differentiate from neoplasm on standard CT imaging, and PET CT scan can be used to differentiate to malignancy in these settings.  However, if PET CT scan is to be used as a problem solving tool in patients of radiation therapy, then histopathologic confirmation of recurrent disease is needed because it area as previously treated with radiation therapy can remain FDG avid for up to 2 years.             Follow-up:  No follow-ups on file.

## 2022-11-07 ENCOUNTER — HOSPITAL ENCOUNTER (OUTPATIENT)
Dept: RADIOLOGY | Facility: HOSPITAL | Age: 47
Discharge: HOME | End: 2022-11-07
Attending: INTERNAL MEDICINE
Payer: MEDICAID

## 2022-11-07 DIAGNOSIS — C34.90 MALIGNANT NEOPLASM OF UNSPECIFIED PART OF UNSPECIFIED BRONCHUS OR LUNG: ICD-10-CM

## 2022-11-07 DIAGNOSIS — C34.90 SQUAMOUS CELL CARCINOMA OF LUNG, UNSPECIFIED LATERALITY: ICD-10-CM

## 2022-11-07 PROCEDURE — 25500020 PHARM REV CODE 255: Performed by: INTERNAL MEDICINE

## 2022-11-07 PROCEDURE — 71260 CT THORAX DX C+: CPT | Mod: TC

## 2022-11-07 RX ADMIN — IOHEXOL 100 ML: 350 INJECTION, SOLUTION INTRAVENOUS at 10:11

## 2022-11-08 ENCOUNTER — INFUSION (OUTPATIENT)
Dept: INFUSION THERAPY | Facility: HOSPITAL | Age: 47
End: 2022-11-08
Attending: INTERNAL MEDICINE
Payer: MEDICAID

## 2022-11-08 ENCOUNTER — OFFICE VISIT (OUTPATIENT)
Dept: HEMATOLOGY/ONCOLOGY | Facility: CLINIC | Age: 47
End: 2022-11-08
Payer: MEDICAID

## 2022-11-08 VITALS
DIASTOLIC BLOOD PRESSURE: 86 MMHG | TEMPERATURE: 98 F | RESPIRATION RATE: 20 BRPM | HEART RATE: 66 BPM | SYSTOLIC BLOOD PRESSURE: 128 MMHG | HEIGHT: 65 IN | BODY MASS INDEX: 20.49 KG/M2 | WEIGHT: 123 LBS | OXYGEN SATURATION: 99 %

## 2022-11-08 DIAGNOSIS — Z51.81 THERAPEUTIC DRUG MONITORING: ICD-10-CM

## 2022-11-08 DIAGNOSIS — D63.8 ANEMIA OF CHRONIC DISEASE: Chronic | ICD-10-CM

## 2022-11-08 DIAGNOSIS — C34.90 MALIGNANT NEOPLASM OF UNSPECIFIED PART OF UNSPECIFIED BRONCHUS OR LUNG: ICD-10-CM

## 2022-11-08 DIAGNOSIS — E22.2 SIADH (SYNDROME OF INAPPROPRIATE ADH PRODUCTION): Primary | Chronic | ICD-10-CM

## 2022-11-08 DIAGNOSIS — C34.90 SQUAMOUS CELL CARCINOMA OF LUNG, UNSPECIFIED LATERALITY: Primary | ICD-10-CM

## 2022-11-08 PROCEDURE — 99214 PR OFFICE/OUTPT VISIT, EST, LEVL IV, 30-39 MIN: ICD-10-PCS | Mod: S$PBB,,, | Performed by: INTERNAL MEDICINE

## 2022-11-08 PROCEDURE — 3008F PR BODY MASS INDEX (BMI) DOCUMENTED: ICD-10-PCS | Mod: CPTII,,, | Performed by: INTERNAL MEDICINE

## 2022-11-08 PROCEDURE — 1159F MED LIST DOCD IN RCRD: CPT | Mod: CPTII,,, | Performed by: INTERNAL MEDICINE

## 2022-11-08 PROCEDURE — 99213 OFFICE O/P EST LOW 20 MIN: CPT | Mod: PBBFAC,25 | Performed by: INTERNAL MEDICINE

## 2022-11-08 PROCEDURE — A4216 STERILE WATER/SALINE, 10 ML: HCPCS | Performed by: INTERNAL MEDICINE

## 2022-11-08 PROCEDURE — 63600175 PHARM REV CODE 636 W HCPCS: Performed by: INTERNAL MEDICINE

## 2022-11-08 PROCEDURE — 3074F SYST BP LT 130 MM HG: CPT | Mod: CPTII,,, | Performed by: INTERNAL MEDICINE

## 2022-11-08 PROCEDURE — 1160F RVW MEDS BY RX/DR IN RCRD: CPT | Mod: CPTII,,, | Performed by: INTERNAL MEDICINE

## 2022-11-08 PROCEDURE — 3079F PR MOST RECENT DIASTOLIC BLOOD PRESSURE 80-89 MM HG: ICD-10-PCS | Mod: CPTII,,, | Performed by: INTERNAL MEDICINE

## 2022-11-08 PROCEDURE — 25000003 PHARM REV CODE 250: Performed by: INTERNAL MEDICINE

## 2022-11-08 PROCEDURE — 3008F BODY MASS INDEX DOCD: CPT | Mod: CPTII,,, | Performed by: INTERNAL MEDICINE

## 2022-11-08 PROCEDURE — 1159F PR MEDICATION LIST DOCUMENTED IN MEDICAL RECORD: ICD-10-PCS | Mod: CPTII,,, | Performed by: INTERNAL MEDICINE

## 2022-11-08 PROCEDURE — 99214 OFFICE O/P EST MOD 30 MIN: CPT | Mod: S$PBB,,, | Performed by: INTERNAL MEDICINE

## 2022-11-08 PROCEDURE — 3074F PR MOST RECENT SYSTOLIC BLOOD PRESSURE < 130 MM HG: ICD-10-PCS | Mod: CPTII,,, | Performed by: INTERNAL MEDICINE

## 2022-11-08 PROCEDURE — 96413 CHEMO IV INFUSION 1 HR: CPT

## 2022-11-08 PROCEDURE — 3079F DIAST BP 80-89 MM HG: CPT | Mod: CPTII,,, | Performed by: INTERNAL MEDICINE

## 2022-11-08 PROCEDURE — 1160F PR REVIEW ALL MEDS BY PRESCRIBER/CLIN PHARMACIST DOCUMENTED: ICD-10-PCS | Mod: CPTII,,, | Performed by: INTERNAL MEDICINE

## 2022-11-08 RX ORDER — SODIUM CHLORIDE 0.9 % (FLUSH) 0.9 %
10 SYRINGE (ML) INJECTION
Status: DISCONTINUED | OUTPATIENT
Start: 2022-11-08 | End: 2022-11-08 | Stop reason: HOSPADM

## 2022-11-08 RX ORDER — DIPHENHYDRAMINE HYDROCHLORIDE 50 MG/ML
50 INJECTION INTRAMUSCULAR; INTRAVENOUS ONCE AS NEEDED
Status: DISCONTINUED | OUTPATIENT
Start: 2022-11-08 | End: 2022-11-08 | Stop reason: HOSPADM

## 2022-11-08 RX ORDER — HEPARIN 100 UNIT/ML
500 SYRINGE INTRAVENOUS
Status: DISCONTINUED | OUTPATIENT
Start: 2022-11-08 | End: 2022-11-08 | Stop reason: HOSPADM

## 2022-11-08 RX ORDER — EPINEPHRINE 0.3 MG/.3ML
0.3 INJECTION SUBCUTANEOUS ONCE AS NEEDED
Status: DISCONTINUED | OUTPATIENT
Start: 2022-11-08 | End: 2022-11-08 | Stop reason: HOSPADM

## 2022-11-08 RX ADMIN — DURVALUMAB 1500 MG: 500 INJECTION, SOLUTION INTRAVENOUS at 11:11

## 2022-11-08 RX ADMIN — Medication 500 UNITS: at 12:11

## 2022-11-08 RX ADMIN — Medication 10 ML: at 12:11

## 2022-11-08 RX ADMIN — SODIUM CHLORIDE: 9 INJECTION, SOLUTION INTRAVENOUS at 11:11

## 2022-12-06 ENCOUNTER — OFFICE VISIT (OUTPATIENT)
Dept: HEMATOLOGY/ONCOLOGY | Facility: CLINIC | Age: 47
End: 2022-12-06
Payer: MEDICAID

## 2022-12-06 ENCOUNTER — INFUSION (OUTPATIENT)
Dept: INFUSION THERAPY | Facility: HOSPITAL | Age: 47
End: 2022-12-06
Attending: INTERNAL MEDICINE
Payer: MEDICAID

## 2022-12-06 VITALS
WEIGHT: 123 LBS | TEMPERATURE: 98 F | HEIGHT: 65 IN | DIASTOLIC BLOOD PRESSURE: 69 MMHG | OXYGEN SATURATION: 99 % | BODY MASS INDEX: 20.49 KG/M2 | SYSTOLIC BLOOD PRESSURE: 103 MMHG | RESPIRATION RATE: 20 BRPM | HEART RATE: 90 BPM

## 2022-12-06 DIAGNOSIS — C34.90 SQUAMOUS CELL CARCINOMA OF LUNG, UNSPECIFIED LATERALITY: Primary | ICD-10-CM

## 2022-12-06 DIAGNOSIS — Z86.2 HISTORY OF THROMBOCYTOSIS: ICD-10-CM

## 2022-12-06 DIAGNOSIS — Z29.89 ENCOUNTER FOR IMMUNOTHERAPY: ICD-10-CM

## 2022-12-06 DIAGNOSIS — C34.90 SQUAMOUS CELL CARCINOMA OF LUNG, UNSPECIFIED LATERALITY: Primary | Chronic | ICD-10-CM

## 2022-12-06 DIAGNOSIS — D50.8 OTHER IRON DEFICIENCY ANEMIA: Chronic | ICD-10-CM

## 2022-12-06 PROCEDURE — 3078F PR MOST RECENT DIASTOLIC BLOOD PRESSURE < 80 MM HG: ICD-10-PCS | Mod: CPTII,,, | Performed by: NURSE PRACTITIONER

## 2022-12-06 PROCEDURE — 3008F PR BODY MASS INDEX (BMI) DOCUMENTED: ICD-10-PCS | Mod: CPTII,,, | Performed by: NURSE PRACTITIONER

## 2022-12-06 PROCEDURE — 96413 CHEMO IV INFUSION 1 HR: CPT

## 2022-12-06 PROCEDURE — 1159F MED LIST DOCD IN RCRD: CPT | Mod: CPTII,,, | Performed by: NURSE PRACTITIONER

## 2022-12-06 PROCEDURE — 1159F PR MEDICATION LIST DOCUMENTED IN MEDICAL RECORD: ICD-10-PCS | Mod: CPTII,,, | Performed by: NURSE PRACTITIONER

## 2022-12-06 PROCEDURE — 99214 PR OFFICE/OUTPT VISIT, EST, LEVL IV, 30-39 MIN: ICD-10-PCS | Mod: S$PBB,,, | Performed by: NURSE PRACTITIONER

## 2022-12-06 PROCEDURE — 3074F SYST BP LT 130 MM HG: CPT | Mod: CPTII,,, | Performed by: NURSE PRACTITIONER

## 2022-12-06 PROCEDURE — 63600175 PHARM REV CODE 636 W HCPCS: Performed by: INTERNAL MEDICINE

## 2022-12-06 PROCEDURE — 3078F DIAST BP <80 MM HG: CPT | Mod: CPTII,,, | Performed by: NURSE PRACTITIONER

## 2022-12-06 PROCEDURE — 99214 OFFICE O/P EST MOD 30 MIN: CPT | Mod: S$PBB,,, | Performed by: NURSE PRACTITIONER

## 2022-12-06 PROCEDURE — 3008F BODY MASS INDEX DOCD: CPT | Mod: CPTII,,, | Performed by: NURSE PRACTITIONER

## 2022-12-06 PROCEDURE — 25000003 PHARM REV CODE 250: Performed by: INTERNAL MEDICINE

## 2022-12-06 PROCEDURE — 99213 OFFICE O/P EST LOW 20 MIN: CPT | Mod: PBBFAC,25 | Performed by: NURSE PRACTITIONER

## 2022-12-06 PROCEDURE — 3074F PR MOST RECENT SYSTOLIC BLOOD PRESSURE < 130 MM HG: ICD-10-PCS | Mod: CPTII,,, | Performed by: NURSE PRACTITIONER

## 2022-12-06 RX ORDER — EPINEPHRINE 0.3 MG/.3ML
0.3 INJECTION SUBCUTANEOUS ONCE AS NEEDED
Status: DISCONTINUED | OUTPATIENT
Start: 2022-12-06 | End: 2022-12-06 | Stop reason: HOSPADM

## 2022-12-06 RX ORDER — DIPHENHYDRAMINE HYDROCHLORIDE 50 MG/ML
50 INJECTION INTRAMUSCULAR; INTRAVENOUS ONCE AS NEEDED
Status: DISCONTINUED | OUTPATIENT
Start: 2022-12-06 | End: 2022-12-06 | Stop reason: HOSPADM

## 2022-12-06 RX ORDER — HEPARIN 100 UNIT/ML
500 SYRINGE INTRAVENOUS
Status: DISCONTINUED | OUTPATIENT
Start: 2022-12-06 | End: 2022-12-06 | Stop reason: HOSPADM

## 2022-12-06 RX ORDER — SODIUM CHLORIDE 0.9 % (FLUSH) 0.9 %
10 SYRINGE (ML) INJECTION
Status: DISCONTINUED | OUTPATIENT
Start: 2022-12-06 | End: 2022-12-06 | Stop reason: HOSPADM

## 2022-12-06 RX ADMIN — SODIUM CHLORIDE: 9 INJECTION, SOLUTION INTRAVENOUS at 10:12

## 2022-12-06 RX ADMIN — DURVALUMAB 1500 MG: 500 INJECTION, SOLUTION INTRAVENOUS at 11:12

## 2022-12-06 RX ADMIN — Medication 500 UNITS: at 12:12

## 2022-12-06 NOTE — PROGRESS NOTES
History:  Past Medical History:   Diagnosis Date    Anemia of chronic disease 5/15/2022    Attention deficit hyperactivity disorder (ADHD), predominantly inattentive type 7/15/2022    Cachexia 7/15/2022    Depression with anxiety 7/15/2022    Hypercalcemia 5/15/2022    Iron deficiency anemia 5/15/2022    Lung cancer     Pleural effusion on right 2022    Pure hypercholesterolemia 10/27/2022    SIADH (syndrome of inappropriate ADH production) 5/15/2022    Squamous cell carcinoma of lung 3/21/2022    Tobacco user 7/15/2022   Past medical history: Attention deficit disorder. Tobacco abuse. Tonsillectomy.  Social history: .  Lives in [place], [place].  Has 2 children.  Has worked as a  all his life (more than 30-35 years).  Smoked 1-1/2 packs of cigarettes daily for 15 years; then, 1 pack daily for 1 year; now, for last 2 months, 2 cigarettes daily.  Heavy alcohol use in the past; 8-10 beers daily for 10 years; quit 1-1/2 years ago.  No illicit drugs.  Family history: No family members with cancer.  Health maintenance: So far, does not have a primary care physician.  Apparently, he is going to see a primary care physician for the first time next week.  Past Surgical History:   Procedure Laterality Date    Bronchoscopy w endobronchial biopsy  2021    Endobronchial ablation of left main stem occlusion  2021    MEDIPORT INSERTION, SINGLE  10/11/2021    TONSILLECTOMY  1981    US guided right thoracentesis Right 2022      Social History     Socioeconomic History    Marital status:    Tobacco Use    Smoking status: Former     Packs/day: 1.50     Types: Cigarettes     Start date: 1990     Quit date: 2021     Years since quittin.9    Smokeless tobacco: Never   Substance and Sexual Activity    Alcohol use: Yes    Drug use: Never    Sexual activity: Yes      Family History   Problem Relation Age of Onset    ALS Mother 40    Nephrolithiasis Father      Cholecystitis Father     Psychosis Sister     Diabetes type II Son       Reason for Follow-up:   -Squamous cell carcinoma of lung, stage III   -Microcytic anemia (relative iron deficiency)   -Hypercalcemia of malignancy   -Thrombocytosis   -Hyponatremia (SIADH)       History of Present Illness:   Lung Cancer      Oncologic/Hematologic History:  Oncology History   Squamous cell carcinoma of lung   1/25/2022 -  Chemotherapy    Treatment Summary   Plan Name: OP DURVALUMAB 1500 MG Q4W  Treatment Goal: Control  Status: Active  Start Date: 1/25/2022  End Date: 12/2/2022 (Planned)  Provider: Brendan Amaral MD  Chemotherapy: [No matching medication found in this treatment plan]       3/21/2022 Initial Diagnosis    Squamous cell carcinoma of lung     5/15/2022 Cancer Staged    Staging form: Lung, AJCC 8th Edition  - Clinical stage from 5/15/2022: Stage IIIB (cT4, cN2, cM0)          #Squamous cell carcinoma of lung:   -Bronchoscopy (08/18/2021).  CT chest with contrast (08/06/2021).  CT A/P without contrast (08/06/2021.  Brain MRI (09/10/2021).  Bone scan (09/11/2021).   -10.0 x 6.8 x 7.0 cm subcarinal mass, compressive effect upon right mainstem bronchus and right lower lobe pulmonary artery branches, complete obliteration of right lower lobe bronchi, partial narrowing of the main pulmonary artery   -Right lower lung lobe large masslike consolidation with central cavitations, without clear interface between the right lower lung lobe mass consolidation and the mediastinal mass   -On bronchoscopy, large fungating mass at maximiliano, obliterating the right mainstem bronchus and partially occluding left mainstem   -No metastasis on CTs, bone scan, brain MRI   -Causing postobstructive pneumonia and hospitalization   -S/p laser tumor debulking at [facility] 08/27/2021   -Causing hyponatremia secondary to SIADH and hypercalcemia   -Semiurgently, started on radiation therapy, pending concurrent chemotherapy, given complication of right  mainstem bronchus, complete obliteration of right lower lobe bronchi, causing postobstructive pneumonia, as well as to prevent precipitous respiratory insufficiency (subsequently, with good subjective and objective response)   >>   -Tumor >7 cm; tumor invading mediastinum; therefore, cT4   -Mediastinal mass, malignant gosia complex versus extension of right lower lobe mass, therefore, cN2 or cN3   -M0   >>> Stage IIIB or stage IIIC   -Completed 2 weeks of hypofractionated palliative radiation therapy (09/13/2021-09/30/2021); exertional dyspnea improved)   (Excellent subjective and objective response to radiotherapy)   (Did not receive concurrent chemotherapy with RT)   -25 pound weight loss over 1 year, including 15 pound weight loss in last 3 months; anorexic   -09/20/2021: ECOG 2; exertional dyspnea has improved with palliative radiation therapy (completed 2 weeks of hypofractionated radiotherapy 09/30/2021)   -09/30/2021: PET/CT (comparison: CT C/A/P 08/06/2021): Hypermetabolic subcarinal mass with FDG avid soft tissue extending to the right hilar region (5 cm subcarinal mass, extends along the right mainstem bronchus and right upper lobe bronchus with the right perihilar region); possible 8 mm FDG avid lymph node anterior to the right mainstem bronchus; no distant sites of FDG avid metastatic disease; multifocal right mid and lower lung consolidation with no hypermetabolic component appreciated; small to moderate right pleural effusion  (PET/CT: Subcarinal mass 5 cm, was >7 cm to start with, thus, shrunk with radiation treatment; 8 mm mediastinal lymph node; no distant metastasis)   -BRAF mutation negative   -RET gene rearrangement not detected   -10/11/2021: Left-sided Mediport placed   -Sequential chemotherapy: Cisplatin/docetaxel (every 3 weeks x4 cycles) (10/18/2021-12/22/2021)   -11/17/2021: CT A/P with contrast (epigastric pain): (Comparison: 08/06/2021): Constipation   -12/27/2021: Restaging CT C/A/P with  contrast (comparison: 09/30/2021): Significantly improved subcarinal/right hilar soft tissue; improved irregular right perihilar opacities; trace right pleural effusion   -Consolidation durvalumab started 01/25/2022 (every 4 weeks)   -03/18/2022: Surveillance chest CT with contrast (comparison: 12/27/2021): Moderate right pleural effusion, larger than before; increased irregular right perihilar opacities particularly anteriorly, may be treatment related; infectious/inflammatory patchy groundglass opacities left lower lung lobe   -01/25/2022: TSH and free T4 normal  -03/22/2022: TSH, free T4 normal  -04/05/2022: CT A/P with contrast (comparison: 12/27/2021): Right-sided pleural effusion that appears loculated; stable left renal cyst  -04/05/2022: Whole-body nuclear medicine bone scan (comparison: Bone scan 09/11/2021): No bone metastasis  -Cycle #4 of consolidation durvalumab on 04/19/2022  -04/22/2022: Right thoracentesis: 40 cc, yellow, clear fluid drained (albumin 3.3; amylase 57; glucose 103; ; protein 5.4)  -s/p maintenance durvalumab 1500 mg IV 06/14/2022 (cycle 6)  -06/16/2022:  Surveillance chest CT without contrast (comparison:  CT chest 03/18/2022):  1. Chronic soft tissue attenuation with loss of normal fat planes in the subcarinal and right hilar region with bronchial wall thickening, narrowing of the right mainstem bronchus and occlusion of the right upper lobe bronchus.  This is similar to prior exam.  2. Perihilar and peribronchovascular opacities extending into the right upper lobe are again seen, slightly improved compared to the previous exam.  3. Moderate right pleural effusion  4. Chronic thickening of the esophagus.  5. Unchanged lesion at T10  -06/13/2022:  TSH and free T4 normal  -08/11/2022:  Iron stores appear normal) serum iron 62, TIBC 259, transferrin saturation 24%); B12 level 673; folate level 7.0; hemoglobin 14.3  -08/19/2022: CT chest with contrast (comparison:  06/16/2022):   "Right lung demonstrates severe upper lobe apical bullous disease, multiple areas of chronic scarring, volume loss, parenchymal architectural distortion most significant in the right upper lobe, moderate pleural effusion, generalized volume loss with leftward mediastinal shift.  No interval change from the prior exam.  -09/08/2022: TSH, free T4 normal  -cycle 9 of consolidation durvalumab, every 4 weeks, on 09/09/2022  -11/07/2022:  Surveillance noncontrast chest CT (comparison:  08/19/2022):  No recurrence or metastasis     09/20/2021:  Presents for initial medical oncology consultation    Interval History:  Clinic follow up today, 12/6/22; accompanied by female family member; clinically no new significant symptoms or concerns. Reportedly notes some "rattling" on his right side and per his account not apparent today when asked. Due for cycle 12 Durvalumab today; no apparent immune mediated reactions. Stable fatigue and appetite. Weight fairly stable. No c/o CP, palpitations. Breathing stable. No fevers, nausea, vomiting, abdominal pain or reported other pain complaints. No cough or hemoptysis. Denies skin concerns. No reported changes in bowel habits.     Medications:  Current Outpatient Medications on File Prior to Visit   Medication Sig Dispense Refill    busPIRone (BUSPAR) 7.5 MG tablet Take 1 tablet (7.5 mg total) by mouth 3 (three) times daily. 90 tablet 5    diclofenac sodium (VOLTAREN) 1 % Gel Apply 2 g topically 4 (four) times daily as needed (pain). Do not exceed 32 grams/day: do not to exceed 8 grams/day/single joint of upper extremities; do not to exceed 16 grams/day/single joint of lower extremities.  Please request refill of this medication from your PCP. (Patient not taking: Reported on 8/11/2022) 100 g 3    durvalumab (IMFINZI) 50 mg/mL Soln chemo injection Inject 10 mg/kg into the vein every 14 (fourteen) days.      EPINEPHrine (EPIPEN 2-SUSANA) 0.3 mg/0.3 mL AtIn Inject 0.3 mLs (0.3 mg total) into the " muscle once. for 1 dose 0.3 mL 3    predniSONE (DELTASONE) 10 MG tablet Take 1 tablet (10 mg total) by mouth daily as needed (wasp sting). 10 tablet 4     No current facility-administered medications on file prior to visit.     Review of Systems:   All systems reviewed and found to be negative except for the symptoms detailed above    Physical Examination:   Vitals:    12/06/22 0931   BP: 103/69   Pulse: 90   Resp: 20   Temp: 97.8 °F (36.6 °C)     Reviewed/discussed labs   General: Alert and oriented. No acute distress  Eye: Pupils are equal, round and reactive to light, Extraocular movements are intact. Normal conjunctiva  HENT: Normocephalic. Oropharynx exam deferred; mask in place due to coronavirus  Neck: Supple, Non-tender  Respiratory: Respirations are non-labored, Symmetrical chest wall expansion. Breath sounds CTA bilaterally  Cardiovascular: Regular rate, rhythm, Normal peripheral perfusion, No bilateral lower extremity edema  Gastrointestinal: Non-distended, Present bowel sounds   Genitourinary: Exam deferred  Lymphatics: No lymphadenopathy appreciated  Musculoskeletal: Moves all extremities  Integumentary: Intact. Warm, dry. No rashes, or lesions to visible skin  Neurologic: No focal deficits  Psychiatric: Cooperative. Appropriate mood and affect     Assessment:  Locally advanced, obviously inoperable squamous cell carcinoma of lung;  large, 10 cm mediastinal mass, possibly arising in the right lower lung lobe, causing compressive effects on right mainstem bronchus and right pulmonary artery;  no metastasis;  s/p bronchoscopic biopsy of carinal mass 08/18/2021;  large fungating mass at maximiliano, obliterating the right mainstem bronchus and partially occluding left mainstem;  causing hyponatremia, hypercalcemia;  worsening dyspnea, fever, postobstructive pneumonia     # Squamous cell carcinoma of lung:  To summarize:  -bronchoscopy 08/18/2021  -10 cm subcarinal mass with compressive effects, right lower lung  lobe large masslike consolidation with central cavitation, large fungating mass at maximiliano, obliterating the right mainstem bronchus and partially occluding left mainstem  -cT4 cN2 M0, stage IIIB  -postobstructive pneumonia and hospitalization  -S/P laser tumor debulking at [facility] 08/27/2021  -hyponatremia secondary to SIADH  -hypercalcemia  -S/P palliative radiotherapy 09/2021, pending definitive concurrent chemoradiation therapy, with subjective and objective improvement  -did not receive chemotherapy concurrent with radiotherapy  -Followed by sequential chemotherapy with cisplatin/docetaxel q3 weeks x4 cycles (10/2021-12/2021)  -with considerable improvement  -consolidation durvalumab started 01/25/2022 (q4 weeks)  -no recurrence or metastasis on surveillance CT chest with contrast 08/19/2022, 11/07/2022     # Molecular markers:  -EGFR mutation negative; PD-L1 negative (TPS 0%); KRAS mutation negative;  -ALK gene rearrangement negative; ROS1 gene rearrangement negative; NTRK NGS fusion panel negative  -BRAF mutation negative; RET gene rearrangement not detected       # Microcytic anemia (relative iron deficiency +/- anemia of chronic disease/malignancy):   -Hemoglobin 6.8-9.3   -MCV 79.7   -MCH, MCHC low   -Stool for occult blood negative x2 (09/2021)   -B12 normal, 755 (09/06/2021)   -Serum iron 9, TIBC 157, transferrin saturation 6%, ferritin 795.5 (09/06/2021)   -Folate low, 6.5 (09/06/2021   -09/30/2021: Serum iron 24, TIBC 220, transferrin saturation 11%, ferritin 714.65 (anemia of chronic disease/relative iron deficiency); B12 level 1046; RBC folate 1147; hemoglobin 10.5     # Hypercalcemia of malignancy:   Calcium level:   -08/06/2021: 10.3 (albumin 2.5)   -9.6-10.2 between 08/07/2021-09/08/2021   -09/09/2021: 10.5 (albumin 1.8)   -09/12/2021: 10.6 (albumin 2.2)   -09/21/2021: 8.2 (albumin 2.1)   -08/07/2021: Intact PTH level normal, 12.7; intact PTH level <2.0   -09/10/2021: PTH needed peptide <2.0    -Zometa 4 mg IV x1 (09/12/2021) >> hypercalcemia resolved    -09/30/2021: Ionized calcium level 5.1, normal (Serum calcium 9.3, albumin 2.6, corrected calcium 10.42)     #Thrombocytosis:   (Subsequently, spontaneously resolved)   -Platelet count 413-588K   -Almost certainly, secondary to underlying malignancy and relative iron deficiency   -09/30/2021: ELDON-2 mutation negative   -10/08/2021: BCR-ABL1 1 fusion transcripts negative  -reactive; subsequently, spontaneously resolved     #Hyponatremia secondary to SIADH:   -Sodium 128-130   -Treated with fluid restriction, salt tablets (by nephrology)  -following w/nephrology      Plan:  -no recurrence or metastases on surveillance CT chest with contrast 08/19/2022   >>>  -Continue consolidation immunotherapy with durvalumab 1500 mg IV every 4 weeks for up to 12 months; last dose due today, 12/6/22  -Check TSH level every 2 months (next, 1st week of January)     Consolidation immunotherapy for patients with unresectable stage II/III NSCLC, PS 0-1, and no disease progression after definitive concurrent chemoradiation  Durvalumab 10 mg/kg IV every 2 weeks or 1500 mg every 4 weeks for up to 12 months (patients with a body weight of 30 kg or > 30 kg) (category 1 for stage III; category 2A for stage II)  This maximizes disease-free survival, 12 month progression free survival, 18 month progression free survival, higher response rate, longer median duration of response, and longer median time to death or distant metastasis.     Initiate surveillance for stage III non-small cell lung cancer, treated with chemoradiation therapy  -Completed treatment 12/2021  -History and physical and chest CT +/- contrast every 3 months for 3 years (12/2021-12/2024), then every 6 months for 2 years, then history and physical and low-dose noncontrast enhanced chest CT annually  -Repeat contrast enhanced chest CT for surveillance in 3 months (February 2023)     -RTC in 4 weeks       Above discussed  with him.  All questions answered  He understands and agrees with this plan      Surveillance:  Discussion: Surveillance for stage I-II non-small cell lung cancer (primary treatment included radiotherapy) or stage III or stage IV (oligo metastatic with all sites treated with definitive intent):     As per NCCN guidelines, history and physical and chest CT with or without contrast every 3-6 months for 3 years; then history and physical and chest CT with or without contrast every 6 months for 2 years; then history and physical and a low-dose noncontrast enhanced chest CT annually.  Residual or new radiographic abnormalities may require more frequent imaging.  PET CT scan or brain MRI scan not routinely indicated.  However, many benign conditions like atelectasis/consolidation/radiation fibrosis are difficult to differentiate from neoplasm on standard CT imaging, and PET CT scan can be used to differentiate to malignancy in these settings.  However, if PET CT scan is to be used as a problem solving tool in patients of radiation therapy, then histopathologic confirmation of recurrent disease is needed because it area as previously treated with radiation therapy can remain FDG avid for up to 2 years.

## 2023-01-03 ENCOUNTER — OFFICE VISIT (OUTPATIENT)
Dept: HEMATOLOGY/ONCOLOGY | Facility: CLINIC | Age: 48
End: 2023-01-03
Payer: MEDICAID

## 2023-01-03 VITALS
TEMPERATURE: 98 F | RESPIRATION RATE: 20 BRPM | WEIGHT: 127 LBS | DIASTOLIC BLOOD PRESSURE: 79 MMHG | SYSTOLIC BLOOD PRESSURE: 136 MMHG | OXYGEN SATURATION: 99 % | BODY MASS INDEX: 21.16 KG/M2 | HEART RATE: 91 BPM | HEIGHT: 65 IN

## 2023-01-03 DIAGNOSIS — Z29.89 ENCOUNTER FOR IMMUNOTHERAPY: ICD-10-CM

## 2023-01-03 DIAGNOSIS — C34.90 SQUAMOUS CELL CARCINOMA OF LUNG, UNSPECIFIED LATERALITY: ICD-10-CM

## 2023-01-03 DIAGNOSIS — C34.90 MALIGNANT NEOPLASM OF UNSPECIFIED PART OF UNSPECIFIED BRONCHUS OR LUNG: Primary | ICD-10-CM

## 2023-01-03 PROCEDURE — 3078F DIAST BP <80 MM HG: CPT | Mod: CPTII,,,

## 2023-01-03 PROCEDURE — 99213 OFFICE O/P EST LOW 20 MIN: CPT | Mod: PBBFAC

## 2023-01-03 PROCEDURE — 3078F PR MOST RECENT DIASTOLIC BLOOD PRESSURE < 80 MM HG: ICD-10-PCS | Mod: CPTII,,,

## 2023-01-03 PROCEDURE — 1160F PR REVIEW ALL MEDS BY PRESCRIBER/CLIN PHARMACIST DOCUMENTED: ICD-10-PCS | Mod: CPTII,,,

## 2023-01-03 PROCEDURE — 99213 PR OFFICE/OUTPT VISIT, EST, LEVL III, 20-29 MIN: ICD-10-PCS | Mod: S$PBB,,,

## 2023-01-03 PROCEDURE — 1159F MED LIST DOCD IN RCRD: CPT | Mod: CPTII,,,

## 2023-01-03 PROCEDURE — 1159F PR MEDICATION LIST DOCUMENTED IN MEDICAL RECORD: ICD-10-PCS | Mod: CPTII,,,

## 2023-01-03 PROCEDURE — 3075F SYST BP GE 130 - 139MM HG: CPT | Mod: CPTII,,,

## 2023-01-03 PROCEDURE — 3008F PR BODY MASS INDEX (BMI) DOCUMENTED: ICD-10-PCS | Mod: CPTII,,,

## 2023-01-03 PROCEDURE — 1160F RVW MEDS BY RX/DR IN RCRD: CPT | Mod: CPTII,,,

## 2023-01-03 PROCEDURE — 3008F BODY MASS INDEX DOCD: CPT | Mod: CPTII,,,

## 2023-01-03 PROCEDURE — 99213 OFFICE O/P EST LOW 20 MIN: CPT | Mod: S$PBB,,,

## 2023-01-03 PROCEDURE — 3075F PR MOST RECENT SYSTOLIC BLOOD PRESS GE 130-139MM HG: ICD-10-PCS | Mod: CPTII,,,

## 2023-01-03 NOTE — PROGRESS NOTES
History:  Past Medical History:   Diagnosis Date    Anemia of chronic disease 5/15/2022    Attention deficit hyperactivity disorder (ADHD), predominantly inattentive type 7/15/2022    Cachexia 7/15/2022    Depression with anxiety 7/15/2022    Hypercalcemia 5/15/2022    Iron deficiency anemia 5/15/2022    Lung cancer     Pleural effusion on right 2022    Pure hypercholesterolemia 10/27/2022    SIADH (syndrome of inappropriate ADH production) 5/15/2022    Squamous cell carcinoma of lung 3/21/2022    Tobacco user 7/15/2022   Past medical history: Attention deficit disorder. Tobacco abuse. Tonsillectomy.  Social history: .  Lives in [place], [place].  Has 2 children.  Has worked as a  all his life (more than 30-35 years).  Smoked 1-1/2 packs of cigarettes daily for 15 years; then, 1 pack daily for 1 year; now, for last 2 months, 2 cigarettes daily.  Heavy alcohol use in the past; 8-10 beers daily for 10 years; quit 1-1/2 years ago.  No illicit drugs.  Family history: No family members with cancer.  Health maintenance: So far, does not have a primary care physician.  Apparently, he is going to see a primary care physician for the first time next week.  Past Surgical History:   Procedure Laterality Date    Bronchoscopy w endobronchial biopsy  2021    Endobronchial ablation of left main stem occlusion  2021    MEDIPORT INSERTION, SINGLE  10/11/2021    TONSILLECTOMY  1981    US guided right thoracentesis Right 2022      Social History     Socioeconomic History    Marital status:    Tobacco Use    Smoking status: Former     Packs/day: 1.50     Types: Cigarettes     Start date: 1990     Quit date: 2021     Years since quittin.0    Smokeless tobacco: Never   Substance and Sexual Activity    Alcohol use: Yes    Drug use: Never    Sexual activity: Yes      Family History   Problem Relation Age of Onset    ALS Mother 40    Nephrolithiasis Father      Cholecystitis Father     Psychosis Sister     Diabetes type II Son       Reason for Follow-up:   -Squamous cell carcinoma of lung, stage III   -Microcytic anemia (relative iron deficiency)   -Hypercalcemia of malignancy   -Thrombocytosis   -Hyponatremia (SIADH)       History of Present Illness:   Lung Cancer        Oncologic/Hematologic History:  Oncology History   Squamous cell carcinoma of lung   1/25/2022 -  Chemotherapy    Treatment Summary   Plan Name: OP DURVALUMAB 1500 MG Q4W  Treatment Goal: Control  Status: Active  Start Date: 1/25/2022  End Date: 12/6/2022  Provider: Brendan Amaral MD  Chemotherapy: [No matching medication found in this treatment plan]       3/21/2022 Initial Diagnosis    Squamous cell carcinoma of lung     5/15/2022 Cancer Staged    Staging form: Lung, AJCC 8th Edition  - Clinical stage from 5/15/2022: Stage IIIB (cT4, cN2, cM0)          #Squamous cell carcinoma of lung:   -Bronchoscopy (08/18/2021).  CT chest with contrast (08/06/2021).  CT A/P without contrast (08/06/2021.  Brain MRI (09/10/2021).  Bone scan (09/11/2021).   -10.0 x 6.8 x 7.0 cm subcarinal mass, compressive effect upon right mainstem bronchus and right lower lobe pulmonary artery branches, complete obliteration of right lower lobe bronchi, partial narrowing of the main pulmonary artery   -Right lower lung lobe large masslike consolidation with central cavitations, without clear interface between the right lower lung lobe mass consolidation and the mediastinal mass   -On bronchoscopy, large fungating mass at maximiliano, obliterating the right mainstem bronchus and partially occluding left mainstem   -No metastasis on CTs, bone scan, brain MRI   -Causing postobstructive pneumonia and hospitalization   -S/p laser tumor debulking at [facility] 08/27/2021   -Causing hyponatremia secondary to SIADH and hypercalcemia   -Semiurgently, started on radiation therapy, pending concurrent chemotherapy, given complication of right  mainstem bronchus, complete obliteration of right lower lobe bronchi, causing postobstructive pneumonia, as well as to prevent precipitous respiratory insufficiency (subsequently, with good subjective and objective response)   >>   -Tumor >7 cm; tumor invading mediastinum; therefore, cT4   -Mediastinal mass, malignant gosia complex versus extension of right lower lobe mass, therefore, cN2 or cN3   -M0   >>> Stage IIIB or stage IIIC   -Completed 2 weeks of hypofractionated palliative radiation therapy (09/13/2021-09/30/2021); exertional dyspnea improved)   (Excellent subjective and objective response to radiotherapy)   (Did not receive concurrent chemotherapy with RT)   -25 pound weight loss over 1 year, including 15 pound weight loss in last 3 months; anorexic   -09/20/2021: ECOG 2; exertional dyspnea has improved with palliative radiation therapy (completed 2 weeks of hypofractionated radiotherapy 09/30/2021)   -09/30/2021: PET/CT (comparison: CT C/A/P 08/06/2021): Hypermetabolic subcarinal mass with FDG avid soft tissue extending to the right hilar region (5 cm subcarinal mass, extends along the right mainstem bronchus and right upper lobe bronchus with the right perihilar region); possible 8 mm FDG avid lymph node anterior to the right mainstem bronchus; no distant sites of FDG avid metastatic disease; multifocal right mid and lower lung consolidation with no hypermetabolic component appreciated; small to moderate right pleural effusion  (PET/CT: Subcarinal mass 5 cm, was >7 cm to start with, thus, shrunk with radiation treatment; 8 mm mediastinal lymph node; no distant metastasis)   -BRAF mutation negative   -RET gene rearrangement not detected   -10/11/2021: Left-sided Mediport placed   -Sequential chemotherapy: Cisplatin/docetaxel (every 3 weeks x4 cycles) (10/18/2021-12/22/2021)   -11/17/2021: CT A/P with contrast (epigastric pain): (Comparison: 08/06/2021): Constipation   -12/27/2021: Restaging CT C/A/P with  contrast (comparison: 09/30/2021): Significantly improved subcarinal/right hilar soft tissue; improved irregular right perihilar opacities; trace right pleural effusion   -Consolidation durvalumab started 01/25/2022 (every 4 weeks)   -03/18/2022: Surveillance chest CT with contrast (comparison: 12/27/2021): Moderate right pleural effusion, larger than before; increased irregular right perihilar opacities particularly anteriorly, may be treatment related; infectious/inflammatory patchy groundglass opacities left lower lung lobe   -01/25/2022: TSH and free T4 normal  -03/22/2022: TSH, free T4 normal  -04/05/2022: CT A/P with contrast (comparison: 12/27/2021): Right-sided pleural effusion that appears loculated; stable left renal cyst  -04/05/2022: Whole-body nuclear medicine bone scan (comparison: Bone scan 09/11/2021): No bone metastasis  -Cycle #4 of consolidation durvalumab on 04/19/2022  -04/22/2022: Right thoracentesis: 40 cc, yellow, clear fluid drained (albumin 3.3; amylase 57; glucose 103; ; protein 5.4)  -s/p maintenance durvalumab 1500 mg IV 06/14/2022 (cycle 6)  -06/16/2022:  Surveillance chest CT without contrast (comparison:  CT chest 03/18/2022):  1. Chronic soft tissue attenuation with loss of normal fat planes in the subcarinal and right hilar region with bronchial wall thickening, narrowing of the right mainstem bronchus and occlusion of the right upper lobe bronchus.  This is similar to prior exam.  2. Perihilar and peribronchovascular opacities extending into the right upper lobe are again seen, slightly improved compared to the previous exam.  3. Moderate right pleural effusion  4. Chronic thickening of the esophagus.  5. Unchanged lesion at T10  -06/13/2022:  TSH and free T4 normal  -08/11/2022:  Iron stores appear normal) serum iron 62, TIBC 259, transferrin saturation 24%); B12 level 673; folate level 7.0; hemoglobin 14.3  -08/19/2022: CT chest with contrast (comparison:  06/16/2022):   Right lung demonstrates severe upper lobe apical bullous disease, multiple areas of chronic scarring, volume loss, parenchymal architectural distortion most significant in the right upper lobe, moderate pleural effusion, generalized volume loss with leftward mediastinal shift.  No interval change from the prior exam.  -09/08/2022: TSH, free T4 normal  -cycle 9 of consolidation durvalumab, every 4 weeks, on 09/09/2022  -11/07/2022:  Surveillance noncontrast chest CT (comparison:  08/19/2022):  No recurrence or metastasis     09/20/2021:  Presents for initial medical oncology consultation    Interval History:  Clinic follow up today, 1/3/23; accompanied by his wife; clinically no new significant symptoms or concerns.Completed cycle 12 Durvalumab 12/6/22; no apparent immune mediated reactions. Stable fatigue and appetite. Weight fairly stable. No c/o CP, palpitations. Breathing stable. No fevers, nausea, vomiting, abdominal pain or reported other pain complaints. No cough or hemoptysis. Denies skin concerns. No reported changes in bowel habits.     Medications:  Current Outpatient Medications on File Prior to Visit   Medication Sig Dispense Refill    busPIRone (BUSPAR) 7.5 MG tablet Take 1 tablet (7.5 mg total) by mouth 3 (three) times daily. 90 tablet 5    diclofenac sodium (VOLTAREN) 1 % Gel Apply 2 g topically 4 (four) times daily as needed (pain). Do not exceed 32 grams/day: do not to exceed 8 grams/day/single joint of upper extremities; do not to exceed 16 grams/day/single joint of lower extremities.  Please request refill of this medication from your PCP. (Patient not taking: Reported on 8/11/2022) 100 g 3    durvalumab (IMFINZI) 50 mg/mL Soln chemo injection Inject 10 mg/kg into the vein every 14 (fourteen) days.      EPINEPHrine (EPIPEN 2-SUSANA) 0.3 mg/0.3 mL AtIn Inject 0.3 mLs (0.3 mg total) into the muscle once. for 1 dose 0.3 mL 3    predniSONE (DELTASONE) 10 MG tablet Take 1 tablet (10 mg total) by mouth  daily as needed (wasp sting). 10 tablet 4     No current facility-administered medications on file prior to visit.     Review of Systems:   All systems reviewed and found to be negative except for the symptoms detailed above    Physical Examination:   Vitals:    01/03/23 1414   BP: 136/79   Pulse: 91   Resp: 20   Temp: 98.1 °F (36.7 °C)     Reviewed/discussed labs   General: Alert and oriented. No acute distress  Eye: Pupils are equal, round and reactive to light, Extraocular movements are intact. Normal conjunctiva  HENT: Normocephalic. Oropharynx exam deferred; mask in place due to coronavirus  Neck: Supple, Non-tender  Respiratory: Respirations are non-labored, Symmetrical chest wall expansion. Breath sounds CTA bilaterally  Cardiovascular: Regular rate, rhythm, Normal peripheral perfusion, No bilateral lower extremity edema  Gastrointestinal: Non-distended, Present bowel sounds   Genitourinary: Exam deferred  Lymphatics: No lymphadenopathy appreciated  Musculoskeletal: Moves all extremities  Integumentary: Intact. Warm, dry. No rashes, or lesions to visible skin  Neurologic: No focal deficits  Psychiatric: Cooperative. Appropriate mood and affect     Assessment:  Locally advanced, obviously inoperable squamous cell carcinoma of lung;  large, 10 cm mediastinal mass, possibly arising in the right lower lung lobe, causing compressive effects on right mainstem bronchus and right pulmonary artery;  no metastasis;  s/p bronchoscopic biopsy of carinal mass 08/18/2021;  large fungating mass at maximiliano, obliterating the right mainstem bronchus and partially occluding left mainstem;  causing hyponatremia, hypercalcemia;  worsening dyspnea, fever, postobstructive pneumonia     # Squamous cell carcinoma of lung:  To summarize:  -bronchoscopy 08/18/2021  -10 cm subcarinal mass with compressive effects, right lower lung lobe large masslike consolidation with central cavitation, large fungating mass at maximiliano, obliterating the  right mainstem bronchus and partially occluding left mainstem  -cT4 cN2 M0, stage IIIB  -postobstructive pneumonia and hospitalization  -S/P laser tumor [facility]ulking at [facility] 08/27/2021  -hyponatremia secondary to SIADH  -hypercalcemia  -S/P palliative radiotherapy 09/2021, pending definitive concurrent chemoradiation therapy, with subjective and objective improvement  -did not receive chemotherapy concurrent with radiotherapy  -Followed by sequential chemotherapy with cisplatin/docetaxel q3 weeks x4 cycles (10/2021-12/2021)  -with considerable improvement  -consolidation durvalumab started 01/25/2022 (q4 weeks)  -no recurrence or metastasis on surveillance CT chest with contrast 08/19/2022, 11/07/2022     # Molecular markers:  -EGFR mutation negative; PD-L1 negative (TPS 0%); KRAS mutation negative;  -ALK gene rearrangement negative; ROS1 gene rearrangement negative; NTRK NGS fusion panel negative  -BRAF mutation negative; RET gene rearrangement not detected       # Microcytic anemia (relative iron deficiency +/- anemia of chronic disease/malignancy):   -Hemoglobin 6.8-9.3   -MCV 79.7   -MCH, MCHC low   -Stool for occult blood negative x2 (09/2021)   -B12 normal, 755 (09/06/2021)   -Serum iron 9, TIBC 157, transferrin saturation 6%, ferritin 795.5 (09/06/2021)   -Folate low, 6.5 (09/06/2021   -09/30/2021: Serum iron 24, TIBC 220, transferrin saturation 11%, ferritin 714.65 (anemia of chronic disease/relative iron deficiency); B12 level 1046; RBC folate 1147; hemoglobin 10.5     # Hypercalcemia of malignancy:   Calcium level:   -08/06/2021: 10.3 (albumin 2.5)   -9.6-10.2 between 08/07/2021-09/08/2021   -09/09/2021: 10.5 (albumin 1.8)   -09/12/2021: 10.6 (albumin 2.2)   -09/21/2021: 8.2 (albumin 2.1)   -08/07/2021: Intact PTH level normal, 12.7; intact PTH level <2.0   -09/10/2021: PTH needed peptide <2.0   -Zometa 4 mg IV x1 (09/12/2021) >> hypercalcemia resolved    -09/30/2021: Ionized calcium level 5.1, normal  (Serum calcium 9.3, albumin 2.6, corrected calcium 10.42)     #Thrombocytosis:   (Subsequently, spontaneously resolved)   -Platelet count 413-588K   -Almost certainly, secondary to underlying malignancy and relative iron deficiency   -09/30/2021: ELDON-2 mutation negative   -10/08/2021: BCR-ABL1 1 fusion transcripts negative  -reactive; subsequently, spontaneously resolved     #Hyponatremia secondary to SIADH:   -Sodium 128-130   -Treated with fluid restriction, salt tablets (by nephrology)  -following w/nephrology      Plan:  -no recurrence or metastases on surveillance CT chest with contrast 08/19/2022   >>>  - Completed consolidation immunotherapy with durvalumab 1500 mg IV every 4 weeks for up to 12 months; last dose was, 12/6/22  -Check TSH level every 2 months (completed without abnormalities)   Consolidation immunotherapy for patients with unresectable stage II/III NSCLC, PS 0-1, and no disease progression after definitive concurrent chemoradiation  Durvalumab 10 mg/kg IV every 2 weeks or 1500 mg every 4 weeks for up to 12 months (patients with a body weight of 30 kg or > 30 kg) (category 1 for stage III; category 2A for stage II)  This maximizes disease-free survival, 12 month progression free survival, 18 month progression free survival, higher response rate, longer median duration of response, and longer median time to death or distant metastasis.     Initiate surveillance for stage III non-small cell lung cancer, treated with chemoradiation therapy  -Completed treatment 12/2021  -History and physical and chest CT +/- contrast every 3 months for 3 years (12/2021-12/2024), then every 6 months for 2 years, then history and physical and low-dose noncontrast enhanced chest CT annually    -Repeat contrast enhanced chest CT for surveillance in 3 months (February 2023)     -[facility] labs and MD visit for scan review      Above discussed with him.  All questions answered  He understands and agrees with this  plan      Surveillance:  Discussion: Surveillance for stage I-II non-small cell lung cancer (primary treatment included radiotherapy) or stage III or stage IV (oligo metastatic with all sites treated with definitive intent):     As per NCCN guidelines, history and physical and chest CT with or without contrast every 3-6 months for 3 years; then history and physical and chest CT with or without contrast every 6 months for 2 years; then history and physical and a low-dose noncontrast enhanced chest CT annually.  Residual or new radiographic abnormalities may require more frequent imaging.  PET CT scan or brain MRI scan not routinely indicated.  However, many benign conditions like atelectasis/consolidation/radiation fibrosis are difficult to differentiate from neoplasm on standard CT imaging, and PET CT scan can be used to differentiate to malignancy in these settings.  However, if PET CT scan is to be used as a problem solving tool in patients of radiation therapy, then histopathologic confirmation of recurrent disease is needed because it area as previously treated with radiation therapy can remain FDG avid for up to 2 years.

## 2023-02-06 ENCOUNTER — HOSPITAL ENCOUNTER (OUTPATIENT)
Dept: RADIOLOGY | Facility: HOSPITAL | Age: 48
Discharge: HOME | End: 2023-02-06
Attending: INTERNAL MEDICINE
Payer: MEDICAID

## 2023-02-06 DIAGNOSIS — E22.2 SIADH (SYNDROME OF INAPPROPRIATE ADH PRODUCTION): ICD-10-CM

## 2023-02-06 DIAGNOSIS — C34.90 MALIGNANT NEOPLASM OF UNSPECIFIED PART OF UNSPECIFIED BRONCHUS OR LUNG: ICD-10-CM

## 2023-02-06 DIAGNOSIS — Z51.81 THERAPEUTIC DRUG MONITORING: ICD-10-CM

## 2023-02-06 DIAGNOSIS — D63.8 ANEMIA OF CHRONIC DISEASE: ICD-10-CM

## 2023-02-06 PROCEDURE — 71250 CT THORAX DX C-: CPT | Mod: TC

## 2023-02-09 ENCOUNTER — OFFICE VISIT (OUTPATIENT)
Dept: HEMATOLOGY/ONCOLOGY | Facility: CLINIC | Age: 48
End: 2023-02-09
Payer: MEDICAID

## 2023-02-09 VITALS
OXYGEN SATURATION: 98 % | HEART RATE: 104 BPM | WEIGHT: 126 LBS | TEMPERATURE: 98 F | DIASTOLIC BLOOD PRESSURE: 78 MMHG | HEIGHT: 65 IN | RESPIRATION RATE: 20 BRPM | SYSTOLIC BLOOD PRESSURE: 119 MMHG | BODY MASS INDEX: 20.99 KG/M2

## 2023-02-09 DIAGNOSIS — E22.2 SIADH (SYNDROME OF INAPPROPRIATE ADH PRODUCTION): Chronic | ICD-10-CM

## 2023-02-09 DIAGNOSIS — J90 PLEURAL EFFUSION ON RIGHT: Chronic | ICD-10-CM

## 2023-02-09 DIAGNOSIS — E83.52 HYPERCALCEMIA: Chronic | ICD-10-CM

## 2023-02-09 DIAGNOSIS — C34.90 SQUAMOUS CELL CARCINOMA OF LUNG, UNSPECIFIED LATERALITY: Primary | Chronic | ICD-10-CM

## 2023-02-09 DIAGNOSIS — D50.8 OTHER IRON DEFICIENCY ANEMIA: Chronic | ICD-10-CM

## 2023-02-09 PROCEDURE — 1160F PR REVIEW ALL MEDS BY PRESCRIBER/CLIN PHARMACIST DOCUMENTED: ICD-10-PCS | Mod: CPTII,,, | Performed by: INTERNAL MEDICINE

## 2023-02-09 PROCEDURE — 3078F DIAST BP <80 MM HG: CPT | Mod: CPTII,,, | Performed by: INTERNAL MEDICINE

## 2023-02-09 PROCEDURE — 1159F MED LIST DOCD IN RCRD: CPT | Mod: CPTII,,, | Performed by: INTERNAL MEDICINE

## 2023-02-09 PROCEDURE — 1160F RVW MEDS BY RX/DR IN RCRD: CPT | Mod: CPTII,,, | Performed by: INTERNAL MEDICINE

## 2023-02-09 PROCEDURE — 99214 PR OFFICE/OUTPT VISIT, EST, LEVL IV, 30-39 MIN: ICD-10-PCS | Mod: S$PBB,,, | Performed by: INTERNAL MEDICINE

## 2023-02-09 PROCEDURE — 3008F PR BODY MASS INDEX (BMI) DOCUMENTED: ICD-10-PCS | Mod: CPTII,,, | Performed by: INTERNAL MEDICINE

## 2023-02-09 PROCEDURE — 3074F PR MOST RECENT SYSTOLIC BLOOD PRESSURE < 130 MM HG: ICD-10-PCS | Mod: CPTII,,, | Performed by: INTERNAL MEDICINE

## 2023-02-09 PROCEDURE — 3008F BODY MASS INDEX DOCD: CPT | Mod: CPTII,,, | Performed by: INTERNAL MEDICINE

## 2023-02-09 PROCEDURE — 99214 OFFICE O/P EST MOD 30 MIN: CPT | Mod: S$PBB,,, | Performed by: INTERNAL MEDICINE

## 2023-02-09 PROCEDURE — 1159F PR MEDICATION LIST DOCUMENTED IN MEDICAL RECORD: ICD-10-PCS | Mod: CPTII,,, | Performed by: INTERNAL MEDICINE

## 2023-02-09 PROCEDURE — 3074F SYST BP LT 130 MM HG: CPT | Mod: CPTII,,, | Performed by: INTERNAL MEDICINE

## 2023-02-09 PROCEDURE — 99214 OFFICE O/P EST MOD 30 MIN: CPT | Mod: PBBFAC | Performed by: INTERNAL MEDICINE

## 2023-02-09 PROCEDURE — 3078F PR MOST RECENT DIASTOLIC BLOOD PRESSURE < 80 MM HG: ICD-10-PCS | Mod: CPTII,,, | Performed by: INTERNAL MEDICINE

## 2023-02-09 NOTE — Clinical Note
Orders for today:   Refer to IR for diagnostic thoracentesis (has right-sided pleural effusion) Send pleural fluid for cytology, cell count, differential, Gram stain, culture, glucose, protein, albumin, amylase   Repeat noncontrast chest CT in May  Follow-up in 3 weeks, after paracentesis by IR.

## 2023-02-09 NOTE — PROGRESS NOTES
History:  Past Medical History:   Diagnosis Date    Anemia of chronic disease 5/15/2022    Attention deficit hyperactivity disorder (ADHD), predominantly inattentive type 7/15/2022    Cachexia 7/15/2022    Depression with anxiety 7/15/2022    Hypercalcemia 5/15/2022    Iron deficiency anemia 5/15/2022    Lung cancer     Pleural effusion on right 2022    Pure hypercholesterolemia 10/27/2022    SIADH (syndrome of inappropriate ADH production) 5/15/2022    Squamous cell carcinoma of lung 3/21/2022    Tobacco user 7/15/2022   Past medical history: Attention deficit disorder. Tobacco abuse. Tonsillectomy.  Social history: .  Lives in [place], [place].  Has 2 children.  Has worked as a  all his life (more than 30-35 years).  Smoked 1-1/2 packs of cigarettes daily for 15 years; then, 1 pack daily for 1 year; now, for last 2 months, 2 cigarettes daily.  Heavy alcohol use in the past; 8-10 beers daily for 10 years; quit 1-1/2 years ago.  No illicit drugs.  Family history: No family members with cancer.  Health maintenance: So far, does not have a primary care physician.  Apparently, he is going to see a primary care physician for the first time next week.  Past Surgical History:   Procedure Laterality Date    Bronchoscopy w endobronchial biopsy  2021    Endobronchial ablation of left main stem occlusion  2021    MEDIPORT INSERTION, SINGLE  10/11/2021    TONSILLECTOMY  1981    US guided right thoracentesis Right 2022      Social History     Socioeconomic History    Marital status:    Tobacco Use    Smoking status: Former     Packs/day: 1.50     Types: Cigarettes     Start date: 1990     Quit date: 2021     Years since quittin.1    Smokeless tobacco: Never   Substance and Sexual Activity    Alcohol use: Yes    Drug use: Never    Sexual activity: Yes      Family History   Problem Relation Age of Onset    ALS Mother 40    Nephrolithiasis Father      Cholecystitis Father     Psychosis Sister     Diabetes type II Son         Reason for Follow-up:   -Squamous cell carcinoma of lung, stage III   -Microcytic anemia (relative iron deficiency)   -Hypercalcemia of malignancy   -Thrombocytosis   -Hyponatremia (SIADH)         History of Present Illness:   Lung Cancer          Oncologic/Hematologic History:  Oncology History   Squamous cell carcinoma of lung   1/25/2022 -  Chemotherapy    Treatment Summary   Plan Name: OP DURVALUMAB 1500 MG Q4W  Treatment Goal: Control  Status: Active  Start Date: 1/25/2022  End Date: 12/6/2022  Provider: Brendan Amaral MD  Chemotherapy: [No matching medication found in this treatment plan]       3/21/2022 Initial Diagnosis    Squamous cell carcinoma of lung     5/15/2022 Cancer Staged    Staging form: Lung, AJCC 8th Edition  - Clinical stage from 5/15/2022: Stage IIIB (cT4, cN2, cM0)       Patient is being followed for history of squamous cell carcinoma of lung, stage IIIB or stage IIIC, treated with chemoradiation therapy, followed by consolidation durvalumab.      Please see assessment and plan section for details.     09/20/2021:   Presents for initial medical oncology consultation      Interval History:  [No matching plan found]   OP DURVALUMAB 1500 MG Q4W   02/09/2023:   -completed 12 cycles of every 4 week consolidation durvalumab 12/06/2022 (thus, completing the prescribed course of consolidation immunotherapy post chemoradiation therapy)  -02/06/2023: Surveillance chest CT without contrast (comparison:  11/07/2022):  1. Slightly increased irregular right perihilar opacities.  2. Right pleural effusion also slightly larger.  -01/03/2023: TSH, free T4 normal  Labs reviewed.    Presents for a follow-up visit.  Doing well.  No complaints.  Quit smoking 18 months ago.  No chest pain, cough, dyspnea, hemoptysis, unusual headaches, focal neurological symptoms, anorexia, etc..  He works in construction.  Able to carry on with  physically demanding job but does get tired more easily than before.  ECOG 0.     Medications:  Current Outpatient Medications on File Prior to Visit   Medication Sig Dispense Refill    busPIRone (BUSPAR) 7.5 MG tablet Take 1 tablet (7.5 mg total) by mouth 3 (three) times daily. 90 tablet 5    diclofenac sodium (VOLTAREN) 1 % Gel Apply 2 g topically 4 (four) times daily as needed (pain). Do not exceed 32 grams/day: do not to exceed 8 grams/day/single joint of upper extremities; do not to exceed 16 grams/day/single joint of lower extremities.  Please request refill of this medication from your PCP. (Patient not taking: Reported on 8/11/2022) 100 g 3    durvalumab (IMFINZI) 50 mg/mL Soln chemo injection Inject 10 mg/kg into the vein every 14 (fourteen) days.      EPINEPHrine (EPIPEN 2-SUSANA) 0.3 mg/0.3 mL AtIn Inject 0.3 mLs (0.3 mg total) into the muscle once. for 1 dose 0.3 mL 3    predniSONE (DELTASONE) 10 MG tablet Take 1 tablet (10 mg total) by mouth daily as needed (wasp sting). 10 tablet 4     No current facility-administered medications on file prior to visit.       Review of Systems:   All systems reviewed and found to be negative except for the symptoms detailed above    Physical Examination:   VITAL SIGNS:   Vitals:    02/09/23 1352   BP: 119/78   Pulse: 104   Resp: 20   Temp: 98.1 °F (36.7 °C)     GENERAL:  In no apparent distress.    HEAD:  No signs of head trauma.  EYES:  Pupils are equal.  Extraocular motions intact.    EARS:  Hearing grossly intact.  MOUTH:  Oropharynx is normal.   NECK:  No adenopathy, no JVD.     CHEST:  Chest with clear breath sounds bilaterally.  No wheezes, rales, rhonchi.    CARDIAC:  Regular rate and rhythm.  S1 and S2, without murmurs, gallops, rubs.  VASCULAR:  No Edema.  Peripheral pulses normal and equal in all extremities.  ABDOMEN:  Soft, without detectable tenderness.  No sign of distention.  No   rebound or guarding, and no masses palpated.   Bowel Sounds  normal.  MUSCULOSKELETAL:  Good range of motion of all major joints. Extremities without clubbing, cyanosis or edema.    NEUROLOGIC EXAM:  Alert and oriented x 3.  No focal sensory or strength deficits.   Speech normal.  Follows commands.  PSYCHIATRIC:  Mood normal.    No results for input(s): CBC in the last 72 hours.   No results for input(s): CMP in the last 72 hours.     Assessment:  Problem List Items Addressed This Visit          Pulmonary    Pleural effusion on right (Chronic)       Renal/    Hypercalcemia (Chronic)       Oncology    Squamous cell carcinoma of lung - Primary (Chronic)    Iron deficiency anemia (Chronic)       Endocrine    SIADH (syndrome of inappropriate ADH production) (Chronic)       Locally advanced, obviously inoperable squamous cell carcinoma of lung;  large, 10 cm mediastinal mass, possibly arising in the right lower lung lobe, causing compressive effects on right mainstem bronchus and right pulmonary artery;  no metastasis;  s/p bronchoscopic biopsy of carinal mass 08/18/2021;  large fungating mass at maximiliano, obliterating the right mainstem bronchus and partially occluding left mainstem;  causing hyponatremia, hypercalcemia;  worsening dyspnea, fever, postobstructive pneumonia     # squamous cell carcinoma of lung:  To summarize:  -bronchoscopy 08/18/2021  -10 cm subcarinal mass with compressive effects, right lower lung lobe large masslike consolidation with central cavitation, large fungating mass at maximiliano, obliterating the right mainstem bronchus and partially occluding left mainstem  -cT4 cN2 M0, stage IIIB  -postobstructive pneumonia and hospitalization  -S/P laser tumor debulking at [facility] 08/27/2021  -hyponatremia secondary to SIADH  -hypercalcemia  -S/P palliative radiotherapy 09/2021, pending definitive concurrent chemoradiation therapy, with subjective and objective improvement  -did not receive chemotherapy concurrent with radiotherapy  -Followed by sequential  chemotherapy with cisplatin/docetaxel q3 weeks x4 cycles (10/2021-12/2021)  -with considerable improvement-S/P consolidation durvalumab every 4 weeks X 12 cycles (01/25/2022-12/06/2022)   -no recurrence or metastases on surveillance CT chest without contrast 02/06/2023  -slightly larger right pleural effusion on surveillance chest CT without contrast 02/06/2023       #Molecular markers:   -EGFR mutation negative; PD-L1 negative (TPS 0%); KRAS mutation negative;   -ALK gene rearrangement negative; ROS1 gene rearrangement negative; NTRK NGS fusion panel negative   -BRAF mutation negative; RET gene rearrangement not detected          #Microcytic anemia (relative iron deficiency +/- anemia of chronic disease/malignancy):   -Hemoglobin 6.8-9.3   -MCV 79.7   -MCH, MCHC low   -Stool for occult blood negative x2 (09/2021)   -B12 normal, 755 (09/06/2021)   -Serum iron 9, TIBC 157, transferrin saturation 6%, ferritin 795.5 (09/06/2021)   -Folate low, 6.5 (09/06/2021   -09/30/2021: Serum iron 24, TIBC 220, transferrin saturation 11%, ferritin 714.65 (anemia of chronic disease/relative iron deficiency); B12 level 1046; RBC folate 1147; hemoglobin 10.5          #Hypercalcemia of malignancy:   Calcium level:   -08/06/2021: 10.3 (albumin 2.5)   -9.6-10.2 between 08/07/2021-09/08/2021   -09/09/2021: 10.5 (albumin 1.8)   -09/12/2021: 10.6 (albumin 2.2)   -09/21/2021: 8.2 (albumin 2.1)   -08/07/2021: Intact PTH level normal, 12.7; intact PTH level <2.0   -09/10/2021: PTH needed peptide <2.0   -Zometa 4 mg IV x1 (09/12/2021) >> hypercalcemia resolved    -09/30/2021: Ionized calcium level 5.1, normal (Serum calcium 9.3, albumin 2.6, corrected calcium 10.42)          #Thrombocytosis:   (Subsequently, spontaneously resolved)   -Platelet count 413-588K   -Almost certainly, secondary to underlying malignancy and relative iron deficiency   -09/30/2021: ELDON-2 mutation negative   -10/08/2021: BCR-ABL1 1 fusion transcripts negative           #Hyponatremia secondary to SIADH:   -Sodium 128-130   -Treated with fluid restriction, salt tablets (by nephrology)        Plan:  -S/P consolidation durvalumab every 4 weeks X 12 cycles (01/25/2022-12/06/2022)   -no recurrence or metastases on surveillance CT chest without contrast 02/06/2023  -slightly larger right pleural effusion on surveillance chest CT without contrast 02/06/2023   >>>  Continue surveillance (see below)   Will refer to IR for right thoracentesis for diagnosis (send pleural fluid for cytology, cell count, differential, Gram stain and culture, glucose, protein, albumin, amylase)    Continue surveillance for stage III non-small cell lung cancer, treated with chemoradiation therapy  -Completed treatment 12/2021  -History and physical and chest CT +/- contrast every 3 months for 3 years (12/2021-12/2024), then every 6 months for 2 years, then history and physical and low-dose noncontrast enhanced chest CT annually   >>>  Repeat noncontrast chest CT in 3 months (May)      -Microcytic anemia   -Anemia of chronic disease/relative iron deficiency      Hypercalcemia of malignancy   -S/p Zometa 4 mg IV x1 (09/12/2021)     Thrombocytosis; reactive; subsequently, spontaneously resolved     Hyponatremia secondary to SIADH secondary to malignancy  -Managed by renal  -Continue fluid restriction, will monitor sodium level     Follow-up in 3 weeks, after thoracentesis by IR     Above discussed with him.  All questions answered.  Discussed labs and scans and gave him copies of relevant report.  He understands and agrees with this plan.   ---------------        Surveillance:   Discussion: Surveillance for stage I-II non-small cell lung cancer (primary treatment included radiotherapy) or stage III or stage IV (oligo metastatic with all sites treated with definitive intent):      As per NCCN guidelines, history and physical and chest CT with or without contrast every 3-6 months for 3 years; then history and physical  and chest CT with or without contrast every 6 months for 2 years; then history and physical and a low-dose noncontrast enhanced chest CT annually.  Residual or new radiographic abnormalities may require more frequent imaging.  PET CT scan or brain MRI scan not routinely indicated.  However, many benign conditions like atelectasis/consolidation/radiation fibrosis are difficult to differentiate from neoplasm on standard CT imaging, and PET CT scan can be used to differentiate to malignancy in these settings.  However, if PET CT scan is to be used as a problem solving tool in patients of radiation therapy, then histopathologic confirmation of recurrent disease is needed because it area as previously treated with radiation therapy can remain FDG avid for up to 2 years.       Follow-up:  No follow-ups on file.

## 2023-02-10 ENCOUNTER — TELEPHONE (OUTPATIENT)
Dept: INTERVENTIONAL RADIOLOGY/VASCULAR | Facility: HOSPITAL | Age: 48
End: 2023-02-10
Payer: MEDICAID

## 2023-02-15 ENCOUNTER — HOSPITAL ENCOUNTER (OUTPATIENT)
Dept: RADIOLOGY | Facility: HOSPITAL | Age: 48
Discharge: HOME | End: 2023-02-15
Attending: INTERNAL MEDICINE
Payer: MEDICAID

## 2023-02-15 ENCOUNTER — HOSPITAL ENCOUNTER (OUTPATIENT)
Dept: INTERVENTIONAL RADIOLOGY/VASCULAR | Facility: HOSPITAL | Age: 48
Discharge: HOME | End: 2023-02-15
Attending: INTERNAL MEDICINE
Payer: MEDICAID

## 2023-02-15 DIAGNOSIS — C34.90 SQUAMOUS CELL CARCINOMA OF LUNG, UNSPECIFIED LATERALITY: ICD-10-CM

## 2023-02-15 DIAGNOSIS — J90 PLEURAL EFFUSION: ICD-10-CM

## 2023-02-15 DIAGNOSIS — J90 PLEURAL EFFUSION ON RIGHT: ICD-10-CM

## 2023-02-15 LAB
%MONO NUCL BF (OHS): 97.4 %
%POLYMORP BF(OHS): 2.6 %
ALBUMIN FLD-MCNC: 3 GM/DL
AMYLASE FLD-CCNC: 31 U/L
BASOPHIL MANUAL BF (OHS): 1 %
CLARITY BODY FLUID (OHS): CLEAR
COLOR BODY FLUID (OHS): NORMAL
EOSINOPHIL MANUAL BF (OHS): 2 %
GLUCOSE FLD-MCNC: 92 MG/DL
LYMPHOCYTE MANUAL BF (OHS): 82 %
MACROPHAGES, FLUID MAN COUNT (OHS): 28
MESOTHELIAL CELLS, FLUID MAN COUNT (OHS): 5
MONOCYTE MANUAL BF (OHS): 15 %
PROT FLD-MCNC: 4.8 GM/DL
RBC COUNT BODY FLUID (OHS): 1000 /UL
WBC # FLD AUTO: 1145 /UL

## 2023-02-15 PROCEDURE — 82947 ASSAY GLUCOSE BLOOD QUANT: CPT | Performed by: INTERNAL MEDICINE

## 2023-02-15 PROCEDURE — 82042 OTHER SOURCE ALBUMIN QUAN EA: CPT | Performed by: INTERNAL MEDICINE

## 2023-02-15 PROCEDURE — 71045 X-RAY EXAM CHEST 1 VIEW: CPT | Mod: TC,59

## 2023-02-15 PROCEDURE — 89051 BODY FLUID CELL COUNT: CPT | Performed by: INTERNAL MEDICINE

## 2023-02-15 PROCEDURE — 84157 ASSAY OF PROTEIN OTHER: CPT | Performed by: INTERNAL MEDICINE

## 2023-02-15 PROCEDURE — 82150 ASSAY OF AMYLASE: CPT | Performed by: INTERNAL MEDICINE

## 2023-02-15 PROCEDURE — 32555 ASPIRATE PLEURA W/ IMAGING: CPT

## 2023-03-02 ENCOUNTER — TELEPHONE (OUTPATIENT)
Dept: HEMATOLOGY/ONCOLOGY | Facility: CLINIC | Age: 48
End: 2023-03-02
Payer: MEDICAID

## 2023-03-02 PROBLEM — J90 EXUDATIVE PLEURAL EFFUSION: Status: ACTIVE | Noted: 2023-03-02

## 2023-03-02 PROBLEM — J90 PLEURAL EFFUSION, RIGHT: Status: ACTIVE | Noted: 2023-03-02

## 2023-03-03 ENCOUNTER — OFFICE VISIT (OUTPATIENT)
Dept: HEMATOLOGY/ONCOLOGY | Facility: CLINIC | Age: 48
End: 2023-03-03
Attending: INTERNAL MEDICINE
Payer: MEDICAID

## 2023-03-03 ENCOUNTER — INFUSION (OUTPATIENT)
Dept: INFUSION THERAPY | Facility: HOSPITAL | Age: 48
End: 2023-03-03
Attending: INTERNAL MEDICINE
Payer: MEDICAID

## 2023-03-03 VITALS
BODY MASS INDEX: 21.26 KG/M2 | HEART RATE: 90 BPM | SYSTOLIC BLOOD PRESSURE: 118 MMHG | RESPIRATION RATE: 20 BRPM | WEIGHT: 127.63 LBS | DIASTOLIC BLOOD PRESSURE: 85 MMHG | TEMPERATURE: 98 F | HEIGHT: 65 IN | OXYGEN SATURATION: 99 %

## 2023-03-03 DIAGNOSIS — J90 PLEURAL EFFUSION ON RIGHT: Chronic | ICD-10-CM

## 2023-03-03 DIAGNOSIS — E22.2 SIADH (SYNDROME OF INAPPROPRIATE ADH PRODUCTION): Chronic | ICD-10-CM

## 2023-03-03 DIAGNOSIS — C34.90 SQUAMOUS CELL CARCINOMA OF LUNG, UNSPECIFIED LATERALITY: Primary | ICD-10-CM

## 2023-03-03 DIAGNOSIS — F90.0 ATTENTION DEFICIT HYPERACTIVITY DISORDER (ADHD), PREDOMINANTLY INATTENTIVE TYPE: Chronic | ICD-10-CM

## 2023-03-03 DIAGNOSIS — C34.90 SQUAMOUS CELL CARCINOMA OF LUNG, UNSPECIFIED LATERALITY: Primary | Chronic | ICD-10-CM

## 2023-03-03 DIAGNOSIS — E83.52 HYPERCALCEMIA: Chronic | ICD-10-CM

## 2023-03-03 DIAGNOSIS — D50.8 OTHER IRON DEFICIENCY ANEMIA: Chronic | ICD-10-CM

## 2023-03-03 DIAGNOSIS — J90 EXUDATIVE PLEURAL EFFUSION: ICD-10-CM

## 2023-03-03 DIAGNOSIS — F41.9 ANXIETY: ICD-10-CM

## 2023-03-03 DIAGNOSIS — J90 PLEURAL EFFUSION, RIGHT: ICD-10-CM

## 2023-03-03 DIAGNOSIS — Z72.0 TOBACCO USER: Chronic | ICD-10-CM

## 2023-03-03 DIAGNOSIS — R45.86 MOOD SWINGS: ICD-10-CM

## 2023-03-03 PROCEDURE — 1160F RVW MEDS BY RX/DR IN RCRD: CPT | Mod: CPTII,,, | Performed by: INTERNAL MEDICINE

## 2023-03-03 PROCEDURE — 1160F PR REVIEW ALL MEDS BY PRESCRIBER/CLIN PHARMACIST DOCUMENTED: ICD-10-PCS | Mod: CPTII,,, | Performed by: INTERNAL MEDICINE

## 2023-03-03 PROCEDURE — 25000003 PHARM REV CODE 250: Performed by: INTERNAL MEDICINE

## 2023-03-03 PROCEDURE — 1159F PR MEDICATION LIST DOCUMENTED IN MEDICAL RECORD: ICD-10-PCS | Mod: CPTII,,, | Performed by: INTERNAL MEDICINE

## 2023-03-03 PROCEDURE — 99214 OFFICE O/P EST MOD 30 MIN: CPT | Mod: S$PBB,,, | Performed by: INTERNAL MEDICINE

## 2023-03-03 PROCEDURE — 96523 IRRIG DRUG DELIVERY DEVICE: CPT

## 2023-03-03 PROCEDURE — 63600175 PHARM REV CODE 636 W HCPCS: Performed by: INTERNAL MEDICINE

## 2023-03-03 PROCEDURE — 99214 PR OFFICE/OUTPT VISIT, EST, LEVL IV, 30-39 MIN: ICD-10-PCS | Mod: S$PBB,,, | Performed by: INTERNAL MEDICINE

## 2023-03-03 PROCEDURE — 99213 OFFICE O/P EST LOW 20 MIN: CPT | Mod: PBBFAC | Performed by: INTERNAL MEDICINE

## 2023-03-03 PROCEDURE — A4216 STERILE WATER/SALINE, 10 ML: HCPCS | Performed by: INTERNAL MEDICINE

## 2023-03-03 PROCEDURE — 1159F MED LIST DOCD IN RCRD: CPT | Mod: CPTII,,, | Performed by: INTERNAL MEDICINE

## 2023-03-03 RX ORDER — HEPARIN 100 UNIT/ML
500 SYRINGE INTRAVENOUS
Status: CANCELLED | OUTPATIENT
Start: 2023-03-03

## 2023-03-03 RX ORDER — SODIUM CHLORIDE 0.9 % (FLUSH) 0.9 %
10 SYRINGE (ML) INJECTION
Status: CANCELLED | OUTPATIENT
Start: 2023-03-03

## 2023-03-03 RX ORDER — SODIUM CHLORIDE 0.9 % (FLUSH) 0.9 %
10 SYRINGE (ML) INJECTION
Status: DISCONTINUED | OUTPATIENT
Start: 2023-03-03 | End: 2023-03-03 | Stop reason: HOSPADM

## 2023-03-03 RX ORDER — HEPARIN 100 UNIT/ML
500 SYRINGE INTRAVENOUS
Status: DISCONTINUED | OUTPATIENT
Start: 2023-03-03 | End: 2023-03-03 | Stop reason: HOSPADM

## 2023-03-03 RX ADMIN — SODIUM CHLORIDE, PRESERVATIVE FREE 10 ML: 5 INJECTION INTRAVENOUS at 11:03

## 2023-03-03 RX ADMIN — Medication 500 UNITS: at 11:03

## 2023-03-03 NOTE — Clinical Note
Orders for today:   Please find report of cytology of pleural fluid  Contrast-enhanced CT scan of chest in May Follow-up with Renal for hyponatremia   Follow-up in May with surveillance CT scan of chest with contrast, CBC, and CMP; earlier, if any concerns in the interim

## 2023-03-03 NOTE — PROGRESS NOTES
History:  Past Medical History:   Diagnosis Date    Anemia of chronic disease 5/15/2022    Attention deficit hyperactivity disorder (ADHD), predominantly inattentive type 7/15/2022    Cachexia 7/15/2022    Depression with anxiety 7/15/2022    Hypercalcemia 5/15/2022    Iron deficiency anemia 5/15/2022    Lung cancer     Pleural effusion on right 2022    Pure hypercholesterolemia 10/27/2022    SIADH (syndrome of inappropriate ADH production) 5/15/2022    Squamous cell carcinoma of lung 3/21/2022    Tobacco user 7/15/2022   Past medical history: Attention deficit disorder. Tobacco abuse. Tonsillectomy.  Social history: .  Lives in [place], [place].  Has 2 children.  Has worked as a  all his life (more than 30-35 years).  Smoked 1-1/2 packs of cigarettes daily for 15 years; then, 1 pack daily for 1 year; now, for last 2 months, 2 cigarettes daily.  Heavy alcohol use in the past; 8-10 beers daily for 10 years; quit 1-1/2 years ago.  No illicit drugs.  Family history: No family members with cancer.  Health maintenance: So far, does not have a primary care physician.  Apparently, he is going to see a primary care physician for the first time next week.  Past Surgical History:   Procedure Laterality Date    Bronchoscopy w endobronchial biopsy  2021    Endobronchial ablation of left main stem occlusion  2021    MEDIPORT INSERTION, SINGLE  10/11/2021    TONSILLECTOMY  1981    US guided right thoracentesis Right 2022      Social History     Socioeconomic History    Marital status:    Tobacco Use    Smoking status: Former     Packs/day: 1.50     Types: Cigarettes     Start date: 1990     Quit date: 2021     Years since quittin.1    Smokeless tobacco: Never   Substance and Sexual Activity    Alcohol use: Yes    Drug use: Never    Sexual activity: Yes      Family History   Problem Relation Age of Onset    ALS Mother 40    Nephrolithiasis Father      Cholecystitis Father     Psychosis Sister     Diabetes type II Son       Reason for Follow-up:   -Squamous cell carcinoma of lung, stage III  -right-sided exudative pleural effusion (S/P thoracentesis 02/15/2023)   -Microcytic anemia (relative iron deficiency)   -Hypercalcemia of malignancy   -Thrombocytosis   -Hyponatremia (SIADH)       History of Present Illness:   Lung Cancer        Oncologic/Hematologic History:  Oncology History   Squamous cell carcinoma of lung   1/25/2022 -  Chemotherapy    Treatment Summary   Plan Name: OP DURVALUMAB 1500 MG Q4W  Treatment Goal: Control  Status: Active  Start Date: 1/25/2022  End Date: 12/6/2022  Provider: Brendan Amaral MD  Chemotherapy: [No matching medication found in this treatment plan]       3/21/2022 Initial Diagnosis    Squamous cell carcinoma of lung     5/15/2022 Cancer Staged    Staging form: Lung, AJCC 8th Edition  - Clinical stage from 5/15/2022: Stage IIIB (cT4, cN2, cM0)       Patient is being followed for history of squamous cell carcinoma of lung, stage IIIB or stage IIIC, treated with chemoradiation therapy, followed by consolidation durvalumab.      Please see assessment and plan section for details.     09/20/2021:   Presents for initial medical oncology consultation      Interval History:  PORT FLUSH   OP DURVALUMAB 1500 MG Q4W   03/03/2023:   -02/15/2023:  Ultrasound-guided right thoracentesis:  700 cc fluid removed  Pleural fluid: Albumin 3 gm/dL; glucose 92; protein 4.8; lymphocytes 82%; macrophages 28%; mesothelial cells 5; eosinophils 2%; basophils 1% (on 02/09/2023: Serum albumin 4.1; serum protein 8.3) (exudative)  Unfortunately, fluid was not sent for cytologic analysis.    Presents for a follow-up visit, accompanied by a female .  In no acute discomfort.  Some night sweats and hot flashes.  Reports anxiety and wishes to be referred to behavioral health.  Denies suicidal or homicidal ideation.  Some mood swings also.  No chest pain,  cough, or dyspnea.  Mild residual right-sided chest discomfort post thoracentesis.  No hemoptysis, unusual headaches, focal neurological symptoms, etc..  Fair appetite.  ECOG 0.  Works in construction.      Medications:  Current Outpatient Medications on File Prior to Visit   Medication Sig Dispense Refill    busPIRone (BUSPAR) 7.5 MG tablet Take 1 tablet (7.5 mg total) by mouth 3 (three) times daily. 90 tablet 5    diclofenac sodium (VOLTAREN) 1 % Gel Apply 2 g topically 4 (four) times daily as needed (pain). Do not exceed 32 grams/day: do not to exceed 8 grams/day/single joint of upper extremities; do not to exceed 16 grams/day/single joint of lower extremities.  Please request refill of this medication from your PCP. (Patient not taking: Reported on 8/11/2022) 100 g 3    durvalumab (IMFINZI) 50 mg/mL Soln chemo injection Inject 10 mg/kg into the vein every 14 (fourteen) days.      EPINEPHrine (EPIPEN 2-SUSANA) 0.3 mg/0.3 mL AtIn Inject 0.3 mLs (0.3 mg total) into the muscle once. for 1 dose 0.3 mL 3    predniSONE (DELTASONE) 10 MG tablet Take 1 tablet (10 mg total) by mouth daily as needed (wasp sting). 10 tablet 4     No current facility-administered medications on file prior to visit.       Review of Systems:   All systems reviewed and found to be negative except for the symptoms detailed above    Physical Examination:   VITAL SIGNS:   There were no vitals filed for this visit.    GENERAL:  In no apparent distress.    HEAD:  No signs of head trauma.  EYES:  Pupils are equal.  Extraocular motions intact.    EARS:  Hearing grossly intact.  MOUTH:  Oropharynx is normal.   NECK:  No adenopathy, no JVD.     CHEST:  Chest with clear breath sounds bilaterally.  No wheezes, rales, rhonchi.    CARDIAC:  Regular rate and rhythm.  S1 and S2, without murmurs, gallops, rubs.  VASCULAR:  No Edema.  Peripheral pulses normal and equal in all extremities.  ABDOMEN:  Soft, without detectable tenderness.  No sign of distention.  No    rebound or guarding, and no masses palpated.   Bowel Sounds normal.  MUSCULOSKELETAL:  Good range of motion of all major joints. Extremities without clubbing, cyanosis or edema.    NEUROLOGIC EXAM:  Alert and oriented x 3.  No focal sensory or strength deficits.   Speech normal.  Follows commands.  PSYCHIATRIC:  Mood normal.    No results for input(s): CBC in the last 72 hours.   No results for input(s): CMP in the last 72 hours.     Assessment:  Problem List Items Addressed This Visit          Pulmonary    Pleural effusion on right (Chronic)    Exudative pleural effusion    Pleural effusion, right       Renal/    Hypercalcemia (Chronic)       Oncology    Squamous cell carcinoma of lung - Primary (Chronic)    Iron deficiency anemia (Chronic)       Endocrine    SIADH (syndrome of inappropriate ADH production) (Chronic)       Other    RESOLVED: Tobacco user (Chronic)   Locally advanced, obviously inoperable squamous cell carcinoma of lung;  large, 10 cm mediastinal mass, possibly arising in the right lower lung lobe, causing compressive effects on right mainstem bronchus and right pulmonary artery;  no metastasis;  s/p bronchoscopic biopsy of carinal mass 08/18/2021;  large fungating mass at maximiliano, obliterating the right mainstem bronchus and partially occluding left mainstem;  causing hyponatremia, hypercalcemia;  worsening dyspnea, fever, postobstructive pneumonia     Squamous cell carcinoma of lung:  -bronchoscopy 08/18/2021  -10 cm subcarinal mass with compressive effects, right lower lung lobe large masslike consolidation with central cavitation, large fungating mass at maximiliano, obliterating the right mainstem bronchus and partially occluding left mainstem  -cT4 cN2 M0, stage IIIB  -postobstructive pneumonia and hospitalization  -S/P [facility]ul[facility] 08/27/2021  -hyponatremia secondary to SIADH  -hypercalcemia  -S/P palliative radiotherapy 09/2021, pending definitive concurrent chemoradiation  therapy, with subjective and objective improvement  -Followed by sequential chemotherapy with cisplatin/docetaxel q3 weeks x4 cycles (10/2021-12/2021)  >>with considerable improvement  -followed by consolidation durvalumab every 4 weeks X 12 cycles (01/25/2022-12/06/2022)   -no recurrence or metastases on surveillance CT chest without contrast 02/06/2023  -slightly larger right pleural effusion on surveillance chest CT without contrast 02/06/2023  -02/15/2023:  Ultrasound-guided right thoracentesis:  700 cc fluid removed  (exudative pleural effusion)       Molecular markers:  -EGFR mutation negative; PD-L1 negative (TPS 0%); KRAS mutation negative;  -ALK gene rearrangement negative; ROS1 gene rearrangement negative; NTRK NGS fusion panel negative  -BRAF mutation negative; RET gene rearrangement not detected         Microcytic anemia (relative iron deficiency +/- anemia of chronic disease/malignancy):   -Hemoglobin 6.8-9.3   -MCV 79.7   -MCH, MCHC low   -Stool for occult blood negative x2 (09/2021)   -B12 normal, 755 (09/06/2021)   -Serum iron 9, TIBC 157, transferrin saturation 6%, ferritin 795.5 (09/06/2021)   -Folate low, 6.5 (09/06/2021   -09/30/2021: Serum iron 24, TIBC 220, transferrin saturation 11%, ferritin 714.65 (anemia of chronic disease/relative iron deficiency); B12 level 1046; RBC folate 1147; hemoglobin 10.5         Hypercalcemia of malignancy:   Calcium level:   -08/06/2021: 10.3 (albumin 2.5)   -9.6-10.2 between 08/07/2021-09/08/2021   -09/09/2021: 10.5 (albumin 1.8)   -09/12/2021: 10.6 (albumin 2.2)   -09/21/2021: 8.2 (albumin 2.1)   -08/07/2021: Intact PTH level normal, 12.7; intact PTH level <2.0   -09/10/2021: PTH needed peptide <2.0   -Zometa 4 mg IV x1 (09/12/2021) >> hypercalcemia resolved    -09/30/2021: Ionized calcium level 5.1, normal (Serum calcium 9.3, albumin 2.6, corrected calcium 10.42)         Thrombocytosis:   (Subsequently, spontaneously resolved)   -Platelet count 413-588K    -Almost certainly, secondary to underlying malignancy and relative iron deficiency   -09/30/2021: ELDON-2 mutation negative   -10/08/2021: BCR-ABL1 1 fusion transcripts negative          #Hyponatremia secondary to SIADH:   -Sodium 128-130   -Treated with fluid restriction, salt tablets (by nephrology)        Plan:  -S/P consolidation durvalumab every 4 weeks X 12 cycles (01/25/2022-12/06/2022)   -no recurrence or metastases on surveillance CT chest without contrast 02/06/2023  -slightly larger right pleural effusion on surveillance chest CT without contrast 02/06/2023  -02/15/2023:  Ultrasound-guided right thoracentesis:  700 cc fluid removed  (exudative pleural effusion)   >>>  Find report of cytologic analysis of pleural fluid  Continue surveillance (see below)    -03/03/2023: Anxiety and mood swings; no suicidal or homicidal ideation; no psychotic symptoms; wants to be referred to behavioral health; have referred    Continue surveillance for stage III non-small cell lung cancer, treated with chemoradiation therapy  -Completed radiotherapy, followed by sequential chemotherapy 12/2021  -History and physical and chest CT +/- contrast every 3 months for 3 years (12/2021-12/2024), then every 6 months for 2 years, then history and physical and low-dose noncontrast enhanced chest CT annually   >>>  Repeat contrast enhanced chest CT in 3 months (May)      -Microcytic anemia   -Anemia of chronic disease/relative iron deficiency      Hypercalcemia of malignancy   -S/p Zometa 4 mg IV x1 (09/12/2021)     Thrombocytosis; reactive; subsequently, spontaneously resolved     Hyponatremia secondary to SIADH secondary to malignancy  -Managed by renal  -Continue fluid restriction, will monitor sodium level     Follow-up in May with surveillance contrast-enhanced chest CT, CBC, CMP; earlier, if any concerns in the interim.  Instructed him to report to emergency room immediately if he starts having worsening chest pain, dyspnea, etc..      Above discussed with him.  All questions answered.  Discussed labs and scans and gave him copies of relevant report.  He understands and agrees with this plan.   ---------------        Surveillance:   Discussion: Surveillance for stage I-II non-small cell lung cancer (primary treatment included radiotherapy) or stage III or stage IV (oligo metastatic with all sites treated with definitive intent):      As per NCCN guidelines, history and physical and chest CT with or without contrast every 3-6 months for 3 years; then history and physical and chest CT with or without contrast every 6 months for 2 years; then history and physical and a low-dose noncontrast enhanced chest CT annually.  Residual or new radiographic abnormalities may require more frequent imaging.  PET CT scan or brain MRI scan not routinely indicated.  However, many benign conditions like atelectasis/consolidation/radiation fibrosis are difficult to differentiate from neoplasm on standard CT imaging, and PET CT scan can be used to differentiate to malignancy in these settings.  However, if PET CT scan is to be used as a problem solving tool in patients of radiation therapy, then histopathologic confirmation of recurrent disease is needed because it area as previously treated with radiation therapy can remain FDG avid for up to 2 years.       Follow-up:  No follow-ups on file.

## 2023-03-13 ENCOUNTER — OFFICE VISIT (OUTPATIENT)
Dept: FAMILY MEDICINE | Facility: CLINIC | Age: 48
End: 2023-03-13
Attending: INTERNAL MEDICINE
Payer: MEDICAID

## 2023-03-13 ENCOUNTER — DOCUMENTATION ONLY (OUTPATIENT)
Dept: HEMATOLOGY/ONCOLOGY | Facility: CLINIC | Age: 48
End: 2023-03-13
Payer: MEDICAID

## 2023-03-13 VITALS
HEIGHT: 65 IN | HEART RATE: 78 BPM | BODY MASS INDEX: 21.09 KG/M2 | OXYGEN SATURATION: 100 % | DIASTOLIC BLOOD PRESSURE: 81 MMHG | SYSTOLIC BLOOD PRESSURE: 142 MMHG | RESPIRATION RATE: 20 BRPM | TEMPERATURE: 98 F | WEIGHT: 126.56 LBS

## 2023-03-13 DIAGNOSIS — F41.1 GAD (GENERALIZED ANXIETY DISORDER): ICD-10-CM

## 2023-03-13 DIAGNOSIS — F43.29 STRESS AND ADJUSTMENT REACTION: ICD-10-CM

## 2023-03-13 DIAGNOSIS — F90.2 ATTENTION DEFICIT HYPERACTIVITY DISORDER (ADHD), COMBINED TYPE: ICD-10-CM

## 2023-03-13 DIAGNOSIS — F32.0 MILD MAJOR DEPRESSION: ICD-10-CM

## 2023-03-13 PROCEDURE — 3079F DIAST BP 80-89 MM HG: CPT | Mod: CPTII,SA,HB, | Performed by: NURSE PRACTITIONER

## 2023-03-13 PROCEDURE — 3008F BODY MASS INDEX DOCD: CPT | Mod: CPTII,SA,HB, | Performed by: NURSE PRACTITIONER

## 2023-03-13 PROCEDURE — 99205 PR OFFICE/OUTPT VISIT, NEW, LEVL V, 60-74 MIN: ICD-10-PCS | Mod: S$PBB,SA,HB, | Performed by: NURSE PRACTITIONER

## 2023-03-13 PROCEDURE — 3079F PR MOST RECENT DIASTOLIC BLOOD PRESSURE 80-89 MM HG: ICD-10-PCS | Mod: CPTII,SA,HB, | Performed by: NURSE PRACTITIONER

## 2023-03-13 PROCEDURE — 90838 PSYTX W PT W E/M 60 MIN: CPT | Mod: PBBFAC | Performed by: NURSE PRACTITIONER

## 2023-03-13 PROCEDURE — 99417 PR PROLONGED SVC, OUTPT, W/WO DIRECT PT CONTACT,  EA ADDTL 15 MIN: ICD-10-PCS | Mod: S$PBB,SA,HB, | Performed by: NURSE PRACTITIONER

## 2023-03-13 PROCEDURE — 99214 OFFICE O/P EST MOD 30 MIN: CPT | Mod: PBBFAC | Performed by: NURSE PRACTITIONER

## 2023-03-13 PROCEDURE — 90838 PR PSYCHOTHERAPY W/PATIENT W/E&M, 60 MIN (ADD ON): ICD-10-PCS | Mod: S$PBB,SA,HB, | Performed by: NURSE PRACTITIONER

## 2023-03-13 PROCEDURE — 1159F PR MEDICATION LIST DOCUMENTED IN MEDICAL RECORD: ICD-10-PCS | Mod: CPTII,SA,HB, | Performed by: NURSE PRACTITIONER

## 2023-03-13 PROCEDURE — 1159F MED LIST DOCD IN RCRD: CPT | Mod: CPTII,SA,HB, | Performed by: NURSE PRACTITIONER

## 2023-03-13 PROCEDURE — 3008F PR BODY MASS INDEX (BMI) DOCUMENTED: ICD-10-PCS | Mod: CPTII,SA,HB, | Performed by: NURSE PRACTITIONER

## 2023-03-13 PROCEDURE — 90838 PSYTX W PT W E/M 60 MIN: CPT | Mod: S$PBB,SA,HB, | Performed by: NURSE PRACTITIONER

## 2023-03-13 PROCEDURE — 99417 PROLNG OP E/M EACH 15 MIN: CPT | Mod: S$PBB,SA,HB, | Performed by: NURSE PRACTITIONER

## 2023-03-13 PROCEDURE — 3077F SYST BP >= 140 MM HG: CPT | Mod: CPTII,SA,HB, | Performed by: NURSE PRACTITIONER

## 2023-03-13 PROCEDURE — 3077F PR MOST RECENT SYSTOLIC BLOOD PRESSURE >= 140 MM HG: ICD-10-PCS | Mod: CPTII,SA,HB, | Performed by: NURSE PRACTITIONER

## 2023-03-13 PROCEDURE — 99205 OFFICE O/P NEW HI 60 MIN: CPT | Mod: S$PBB,SA,HB, | Performed by: NURSE PRACTITIONER

## 2023-03-13 NOTE — PATIENT INSTRUCTIONS
Meds as directed  Keep all healthcare appointments  Continue deep breathing and visualization  Plan one day this coming weekend to go to camp in [place] unless conflicts with grandchild's soccer game  Utilize self-interventions from patient education materials  Nearest ER if overwhelmed   Clinic 4  weeks

## 2023-03-13 NOTE — PROGRESS NOTES
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    Mental Status Exam    Mood: Sad  PHQ: 6 (5-9=mild depression)  Self Rating: Depression: 5/10 and Anxiety: 5/10  Thought Process: Goal directed  Thought Content: Hallucinations: Not present Delusions: Not present  Speech: No deficits  Attitude: Cooperative  Affect: Mood congruent, tearful at times  Appearance: Clean and Casual  Motor Activity: No deficits  Attention/Concentration: Intact  Judgement: Intact  Orientation: Date: yes, Place: yes, Person: yes, Situations: yes  Memory: Immediate: 3/3, Recent: 3/3, Remote: 3/3  Abstract Thinking: Age Appropriate  Gross Est. Of Intelligence: Average  Assets: Motivated for tx, intelligent, educated, verbal, independent, and friendly  Insight: Intact  Self Harm: Not present Protective factors include that he wants to live and it would hurt his family if he took his own life  Harm to Others: Not present    Assessments:   PHQ9:   PHQ9 3/13/2023   Total Score 6       Depression Patient Health Questionnaire 3/13/2023   Over the last two weeks how often have you been bothered by little interest or pleasure in doing things Several days   Over the last two weeks how often have you been bothered by feeling down, depressed or hopeless Several days   PHQ-2 Total Score 2   Over the last two weeks how often have you been bothered by trouble falling or staying asleep, or sleeping too much Not at all   Over the last two weeks how often have you been bothered by feeling tired or having little energy Nearly every day   Over the last two weeks how often have you been bothered by a poor appetite or overeating Not at all   Over the last two weeks how often have you been bothered by feeling bad about yourself - or that you are a failure or have let yourself or your family down Several days   Over the last two weeks how often have you been bothered by trouble " concentrating on things, such as reading the newspaper or watching television Not at all   Over the last two weeks how often have you been bothered by moving or speaking so slowly that other people could have noticed. Or the opposite - being so fidgety or restless that you have been moving around a lot more than usual. Not at all   Over the last two weeks how often have you been bothered by thoughts that you would be better off dead, or of hurting yourself Not at all   If you checked off any problems, how difficult have these problems made it for you to do your work, take care of things at home or get along with other people? Somewhat difficult   Total Score 6   Interpretation Mild          GAD7:   GAD7 3/13/2023   1. Feeling nervous, anxious, or on edge? 2   2. Not being able to stop or control worrying? 3   3. Worrying too much about different things? 3   4. Trouble relaxing? 0   5. Being so restless that it is hard to sit still? 0   6. Becoming easily annoyed or irritable? 1   7. Feeling afraid as if something awful might happen? 1   8. If you checked off any problems, how difficult have these problems made it for you to do your work, take care of things at home, or get along with other people? 1   DELIO-7 Score 10   Trauma History:  History of Emotional/Mental abuse: Denies  History of Physical abuse: Denies  History of Sexual abuse: Denies  History of other trauma: Denies    Legal:  Denies prior arrests, charges, conviction  Denies any upcoming court dates  Denies any pending charges.    Substance Use:  Hx 6-8 beers daily for 10 years.  Began drinking at 15 or 15 y/o.  Stopped smoking cigarettes August 2021--had been using 1.5 packs/daily.  Began smoking at 14 y/o    Family History:     Maternal: Parent: Depression, Bipolar I, and Substance Use: possible cocaine use; inpatient hospitalizations at least 3 times, and one suicide attempt  Aunts: Depression and multiple inpatient admissions, and completed a  "suicide  Paternal:  Denies  Siblings: Sister: Depression    Support System: 1)  Wife of 29 years; 2)  Youngest son (28 y/o)    Coping Strategies: 1)  "Think a lot until it's over" 2)  Listen to pod casts or music    Stressors: 1)  Anxiety about work--cannot identify triggers except that anxiety begins the day before he is to go to a job. Onset June 2022 when he broke his ankle and could not go to work that day.      Goals:  "See what can be done to help me with anxiety"    Previous Treatment:     Was treated with Adderall for 15 years by local psychiatrist for adult onset ADHD.  Two years ago his psychiatrist retired, patient did not establish with the new practice, and he stopped taking the medication without medical supervision.  He felt "bad" for about 10 weeks.  States he's functioning without it.    Began onset anxiety treatment by PCP ~4 months ago--buspirone 7.5 mg daily then bid.  He experience 30-minutes of a "buzz" and about a month ago he stopped taking the medication.  Didn't feel symptom relief.    Social History:   Grew up in:[place], [place]  Raised by:Biological parents  Number of siblings: Younger sister  Patient birth order: Eldest  Describes household as: "Mother was never happy", and she would throw father out every six months.  She was always locked in her room.  Passed away from ALS 10 years ago.   Education: High School 1992  Marital status:  29 years  Number of children: Two sons  Employment: Parttime construction work  Living situation: He and wife live in their house.  Son and his wife live on the property.   70 y/o father lives on the property    Current Presentation:  Referred by:  LYNDON Amaral MD  Referred For: Depression/Anxiety  Initial Visit: Yes    Patient referred by Oncology Clinic. He was diagnosed in August 2021 with squamous cell carcinoma of the lung, and was given "two months to two years to live".  He began radiation for 14 rounds, 4 chemo treatments, and 12 immunotherapy " "treatments.  Sometime while he was having chemo treatments, his diagnosis was changed  to "may die of old age before he dies of cancer" in November or December, 2021.  Currently he has 100% function on his left lung, and the right lung is "pretty damaged". He still has cancer in his lung but it's considered inactive at this time.  States he will never be in remission.  Today he wonders "why he has anxiety since his cancer is controlled--I should be on top of the world".    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  After the patient was diagnosed with cancer, his father kept the company going, and now the patient will work with his father or one of his sons who has started his own construction business.  Patient has a flexible work schedule which could mean a few hours/day and off for several days.      He and his wife  in 1994, had two children, and she works fulltime as a [employer] online .  They have lived in [place] 22 year, and have five grandchildren.      Processes his life since cancer diagnosis.  He knew it was serious, but didn't think it was as serious as it was.  His tumor was inoperable, his airway was 3/4 blocked with cancer, and he was sent to [facility] for further treatment.  He is doing well at this time, and had no anxiety history prior to having cancer. One day after he had returned to work, he broke his ankle in the morning, treatment consisted of wearing a boot for several weeks, and he was able to return to work in a few days.   Processes his anxiety begins a day or so before he has to go to work, and it increases until he arrives at his job.  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  States he cannot identify any triggers except anticipating going to work, and he is fine the days between " work.      Time was spent introducing patent to deep breathing and visualization. Tolerated process well.  Asked patient to be mindful of possible issues that might trigger increased anxiety, and keep a log to bring to next appointment.  Agrees to do so.  Processed lack of desire to re-engage in social activities with his wife and their friends.  Speaks of the camp they have in [place], [place], and his affect brightened considerably.  Suggested he plan one day at their camp this coming weekend, and he is willing to do so unless there's a grandchild's soccer game.      Discussion ensued regarding medication to address symptoms, and he again states that he had side effects from buspirone.  He is willing to consider another medication, and he has an appointment this Thursday, March 16 with his oncologist.  Assured him this provider will inform oncologist regarding possible medication regimen.  Verbalizes understanding.    Wishes to return to  Clinic      Endorses:   Depression symptoms: anxiety isolation tearfulness decreased motivation fatigue in the afternoon due to impaired right lung function, isolates  Anxiety symptoms: excessive anxiety/worry, irritability, muscle tension, and clammy body, stomach distress, heat feeling     Psychotherapy:  Target symptoms: depression, anxiety , adjustment  Why chosen therapy is appropriate versus another modality: relevant to diagnosis, evidence based practice  Outcome monitoring methods: self-report, observation, checklist/rating scale  Therapeutic intervention type: behavior modifying psychotherapy, supportive psychotherapy  Topics discussed/themes: relationships difficulties, stress related to medical comorbidities, difficulty managing affect in interpersonal relationships, building skills sets for symptom management, symptom recognition, financial stressors  The patient's response to the intervention is accepting.   The patient's progress toward treatment goals is good.        Review of systems:   See most recent ROS per PCP    Assessment:      1. Mild major depression    2. DELIO (generalized anxiety disorder)    3. Stress and adjustment reaction    4. Attention deficit hyperactivity disorder (ADHD), combined type           Plan:   Patient Instructions   Meds as directed  Keep all healthcare appointments  Continue deep breathing and visualization  Plan one day this coming weekend to go to camp in [place] unless conflicts with grandchild's soccer game  Utilize self-interventions from patient education materials  Nearest ER if overwhelmed   Clinic 4  weeks         I spent 60  minutes in face-to-face psychotherapy with an additional 45 minutes for E/M services on this date of service.

## 2023-03-14 NOTE — PROGRESS NOTES
Message from DARYL Avila (mental health):    Good evening:  I saw this patient today, and he isn't taking his buspirone due to side effects.  He is willing to consider another medication to address his anxiety and depression.  He is scheduled to see you this Thursday.  Thank you for the referral.  Yvrose Currie    No. It needs to come from you.  His PCP had referred him to [facility] when the buspirone had side effects and he felt it wasn't  working.  When he called [facility], he was discouraged about the wait time until he could get in.  He was encouraged when I told him he was seeing you soon.

## 2023-03-17 ENCOUNTER — DOCUMENTATION ONLY (OUTPATIENT)
Dept: HEMATOLOGY/ONCOLOGY | Facility: CLINIC | Age: 48
End: 2023-03-17
Payer: MEDICAID

## 2023-03-17 DIAGNOSIS — F41.9 ANXIETY: ICD-10-CM

## 2023-03-17 DIAGNOSIS — F41.8 DEPRESSION WITH ANXIETY: Primary | Chronic | ICD-10-CM

## 2023-03-17 DIAGNOSIS — C34.90 SQUAMOUS CELL CARCINOMA OF LUNG: Chronic | ICD-10-CM

## 2023-03-17 NOTE — NURSING
VIRAJ askew spoke with Yvrose Triana regarding patient needing medication to treat anxiety. Reports patient was referred to [facility] but the wait was too long. VIRAJ Askew spoke to patient who agreed to a referral to [facility] for medication management. Referral sent. Attempted to contact patient's PCP Dr. Pete Duran's office at [phone]. Office closed. VIRAJ Askew will follow up when doctor's office is open.

## 2023-03-24 ENCOUNTER — OFFICE VISIT (OUTPATIENT)
Dept: BEHAVIORAL HEALTH | Facility: CLINIC | Age: 48
End: 2023-03-24
Attending: INTERNAL MEDICINE
Payer: MEDICAID

## 2023-03-24 VITALS
TEMPERATURE: 98 F | HEART RATE: 85 BPM | BODY MASS INDEX: 21.66 KG/M2 | HEIGHT: 64 IN | SYSTOLIC BLOOD PRESSURE: 135 MMHG | WEIGHT: 126.88 LBS | DIASTOLIC BLOOD PRESSURE: 92 MMHG

## 2023-03-24 DIAGNOSIS — F41.8 DEPRESSION WITH ANXIETY: Chronic | ICD-10-CM

## 2023-03-24 DIAGNOSIS — F90.9 ADULT ADHD: Primary | ICD-10-CM

## 2023-03-24 DIAGNOSIS — F41.9 ANXIETY: ICD-10-CM

## 2023-03-24 PROCEDURE — 99417 PR PROLONGED SVC, OUTPT, W/WO DIRECT PT CONTACT,  EA ADDTL 15 MIN: ICD-10-PCS | Mod: S$PBB,SA,HB, | Performed by: NURSE PRACTITIONER

## 2023-03-24 PROCEDURE — 3008F PR BODY MASS INDEX (BMI) DOCUMENTED: ICD-10-PCS | Mod: CPTII,SA,HB, | Performed by: NURSE PRACTITIONER

## 2023-03-24 PROCEDURE — 1159F PR MEDICATION LIST DOCUMENTED IN MEDICAL RECORD: ICD-10-PCS | Mod: CPTII,SA,HB, | Performed by: NURSE PRACTITIONER

## 2023-03-24 PROCEDURE — 1159F MED LIST DOCD IN RCRD: CPT | Mod: CPTII,SA,HB, | Performed by: NURSE PRACTITIONER

## 2023-03-24 PROCEDURE — 3008F BODY MASS INDEX DOCD: CPT | Mod: CPTII,SA,HB, | Performed by: NURSE PRACTITIONER

## 2023-03-24 PROCEDURE — 99417 PROLNG OP E/M EACH 15 MIN: CPT | Mod: S$PBB,SA,HB, | Performed by: NURSE PRACTITIONER

## 2023-03-24 PROCEDURE — 3080F PR MOST RECENT DIASTOLIC BLOOD PRESSURE >= 90 MM HG: ICD-10-PCS | Mod: CPTII,SA,HB, | Performed by: NURSE PRACTITIONER

## 2023-03-24 PROCEDURE — 1160F RVW MEDS BY RX/DR IN RCRD: CPT | Mod: CPTII,SA,HB, | Performed by: NURSE PRACTITIONER

## 2023-03-24 PROCEDURE — 3080F DIAST BP >= 90 MM HG: CPT | Mod: CPTII,SA,HB, | Performed by: NURSE PRACTITIONER

## 2023-03-24 PROCEDURE — 99214 OFFICE O/P EST MOD 30 MIN: CPT | Mod: PBBFAC,PN | Performed by: NURSE PRACTITIONER

## 2023-03-24 PROCEDURE — 99215 OFFICE O/P EST HI 40 MIN: CPT | Mod: S$PBB,SA,HB, | Performed by: NURSE PRACTITIONER

## 2023-03-24 PROCEDURE — 3075F PR MOST RECENT SYSTOLIC BLOOD PRESS GE 130-139MM HG: ICD-10-PCS | Mod: CPTII,SA,HB, | Performed by: NURSE PRACTITIONER

## 2023-03-24 PROCEDURE — 1160F PR REVIEW ALL MEDS BY PRESCRIBER/CLIN PHARMACIST DOCUMENTED: ICD-10-PCS | Mod: CPTII,SA,HB, | Performed by: NURSE PRACTITIONER

## 2023-03-24 PROCEDURE — 99215 PR OFFICE/OUTPT VISIT, EST, LEVL V, 40-54 MIN: ICD-10-PCS | Mod: S$PBB,SA,HB, | Performed by: NURSE PRACTITIONER

## 2023-03-24 PROCEDURE — 3075F SYST BP GE 130 - 139MM HG: CPT | Mod: CPTII,SA,HB, | Performed by: NURSE PRACTITIONER

## 2023-03-24 RX ORDER — CLONIDINE HYDROCHLORIDE 0.1 MG/1
0.1 TABLET ORAL NIGHTLY
Qty: 30 TABLET | Refills: 3 | Status: SHIPPED | OUTPATIENT
Start: 2023-03-24 | End: 2023-05-15 | Stop reason: SDUPTHER

## 2023-03-24 RX ORDER — SERTRALINE HYDROCHLORIDE 25 MG/1
25 TABLET, FILM COATED ORAL DAILY
Qty: 14 TABLET | Refills: 0 | Status: SHIPPED | OUTPATIENT
Start: 2023-03-25 | End: 2023-04-08

## 2023-03-24 RX ORDER — SERTRALINE HYDROCHLORIDE 50 MG/1
50 TABLET, FILM COATED ORAL DAILY
Qty: 30 TABLET | Refills: 3 | Status: SHIPPED | OUTPATIENT
Start: 2023-04-09 | End: 2023-05-15 | Stop reason: DRUGHIGH

## 2023-03-24 NOTE — PATIENT INSTRUCTIONS
Clonidine 0.1mg at bedtime for both anxiety and inattention  2. Zoloft 25mg at bedtime x 2 weeks and then increase to 50mg at bedtime for depression and anxiety  3. Continue 1:1 therapy   4. Follow-up in 6 weeks

## 2023-03-24 NOTE — PROGRESS NOTES
Initial Interview  2023  HPI: Deepak Pratt is a 48 y.o. 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 He Once he gets to work, he is fine but finds it difficult to leave to go to work. He feels that he is lacking confidence.  He cannot identify triggers except that anxiety begins the day before (around 1200) he is to go to a job. He starts worrying about going to work the next day and becomes sweaty.   He states that he is the  and is worried about the job getting done correctly and on time.   Onset 2022 when he broke his ankle and could not go to work that day.      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   He was on Adderall at the time because he was inattentive and unorganized.    He does not want to get back on Adderall. He felt that it was working great but his wife states that his attitude was bad. He feels that he may have been addicted. The doses were getting higher and higher.     He explains that when he was originally diagnosed with cancer, he was given 2 months to two years to live. He lost 40lbs and was planning his . States that he did very well with chemo and radiation and now should live into old age. States that he should be happy but that he still gets very emotional.   When he was told about his diagnoses, his was never given any therapy OR he thinks that he may have refused it. His wife told him that he needed  therapy but he refused.    He states that he had started to accept the fact that he might die.   Mainly he was worried about his family having to live through his death.   He believes in a higher power.   He believes that there might be something after death but not sure what.     He wanted to be cremated.   He did not know whether or not death was going to be painful. He did not think about it too much.   He is not concerned about getting hurt at work.     May have some separation anxiety??    He does not have the energy that he used to prior to his cancer diagnosis. He says that he has a lot of people around to help him but he feels guilty for not having the energy or the will/desire.  He feels a little less than the man that he was.     He had a good appetite and sleeps well.     Will try Clonidine 0.1mg at HS to address both anxiety and inattention.   Will also start Zoloft 25mg every night to address anxiety and depression.     Medications:   Current Outpatient Medications   Medication Instructions    diclofenac sodium (VOLTAREN) 2 g, Topical (Top), 4 times daily PRN, Do not exceed 32 grams/day: do not to exceed 8 grams/day/single joint of upper extremities; do not to exceed 16 grams/day/single joint of lower extremities.  Please request refill of this medication from your PCP.    EPINEPHrine (EPIPEN 2-SUSANA) 0.3 mg, Intramuscular, Once    predniSONE (DELTASONE) 10 mg, Oral, Daily PRN        Psychiatric History:   Reports a prior psychiatric history: depression and anxiety  History of mental health out-patient treatment:   History of in-patient psychiatric hospitalization: denies  History of suicidal ideations: denies  History of suicidal threats:   History of suicide attempts: denies  History of self mutilation:     History of psychotropic medications:   Buspar 7.5mg - gave him a sharp/strong buzz for about 30minutes and then nothing  Adderall - for ADD    Family Psychiatric History:  Mental Illness: mother  Bipolar Disorder  Alcohol abuse/addiction:   Drug addiction:     Substance Use History:  Alcohol: Began drinking at 15 or 15 y/o. Hx 6-8 beers daily for 10 years.  Stopped drinking about 3 months prior to cancer diagnosis  Marijuana: denies  Benzodiazepines: denies  Opiates: denies  Stimulants: Adderall x15  years in the past - 90mg a day in the end. Stopped takikng the Adderall a month prior to being diagnosed with cancer  Cocaine: denies  Methamphetamine: denies  Nicotine: Stopped smoking cigarettes August 2021--had been using 1.5 packs/daily.  Began smoking at 16 y/o  Caffeine:    Social History:   Grew up in: [place]  Raised by: parents  Number of siblings: one sister  Education: HS grad  Employment: PT construction  Marital Status:  x 29 years  Children: 32yo son and a 26yo son  Living situation: lives in a house with his wife  Sabianist affiliation:     Trauma History:  History of Emotional/Mental abuse: denies  History of Physical abuse: denies  History of Sexual abuse: denies  History of other trauma: being given a CA diagnosis and  told that he had 2months to 2 years to live    Legal History:  Legal history: denies  Denies being on probation or parole  Denies any upcoming court dates  Denies any pending charges.    PHQ Score:   03/24/2023: 5    DELIO-7 Score:   03/24/2023: 10    Mental Status Evaluation:  Appearance:  age appropriate, casually dressed, neatly groomed   Behavior:  normal, cooperative   Speech:  no latency; no press   Mood:  anxious, dysthymic   Affect:  mood-congruent   Thought Process:  normal and logical   Thought Content:  normal, no suicidality, no homicidality, delusions, or paranoia   Sensorium:  grossly intact   Cognition:  grossly intact   Insight:  intact   Judgment:  behavior is adequate to circumstances     Impression:  Anxiety/Separation Anxiety  2. MDD  3. ADHD - inattentive    Plan:  Clonidine 0.1mg at bedtime for both anxiety and inattention  2. Zoloft 25mg at bedtime x 2  weeks and then increase to 50mg at bedtime for depression and anxiety  3. Continue 1:1 therapy   4. Follow-up in 6 weeks

## 2023-04-11 PROBLEM — J90 EXUDATIVE PLEURAL EFFUSION: Chronic | Status: ACTIVE | Noted: 2023-03-02

## 2023-04-11 PROBLEM — F41.9 ANXIETY: Chronic | Status: RESOLVED | Noted: 2023-03-03 | Resolved: 2023-04-11

## 2023-04-11 PROBLEM — R45.86 MOOD SWINGS: Status: RESOLVED | Noted: 2023-03-03 | Resolved: 2023-04-11

## 2023-04-11 PROBLEM — F41.9 ANXIETY: Chronic | Status: ACTIVE | Noted: 2023-03-03

## 2023-04-11 PROBLEM — J90 PLEURAL EFFUSION, RIGHT: Status: RESOLVED | Noted: 2023-03-02 | Resolved: 2023-04-11

## 2023-05-09 ENCOUNTER — HOSPITAL ENCOUNTER (OUTPATIENT)
Dept: RADIOLOGY | Facility: HOSPITAL | Age: 48
Discharge: HOME | End: 2023-05-09
Attending: INTERNAL MEDICINE
Payer: MEDICAID

## 2023-05-09 DIAGNOSIS — E22.2 SIADH (SYNDROME OF INAPPROPRIATE ADH PRODUCTION): ICD-10-CM

## 2023-05-09 DIAGNOSIS — D50.8 OTHER IRON DEFICIENCY ANEMIA: ICD-10-CM

## 2023-05-09 DIAGNOSIS — C34.90 SQUAMOUS CELL CARCINOMA OF LUNG, UNSPECIFIED LATERALITY: ICD-10-CM

## 2023-05-09 PROCEDURE — 71260 CT THORAX DX C+: CPT | Mod: TC

## 2023-05-09 PROCEDURE — 25500020 PHARM REV CODE 255: Performed by: INTERNAL MEDICINE

## 2023-05-09 RX ADMIN — IOHEXOL 100 ML: 350 INJECTION, SOLUTION INTRAVENOUS at 07:05

## 2023-05-13 PROBLEM — Z08 ENCOUNTER FOR FOLLOW-UP SURVEILLANCE OF LUNG CANCER: Status: ACTIVE | Noted: 2023-05-13

## 2023-05-13 PROBLEM — Z85.118 ENCOUNTER FOR FOLLOW-UP SURVEILLANCE OF LUNG CANCER: Status: ACTIVE | Noted: 2023-05-13

## 2023-05-14 NOTE — PROGRESS NOTES
History:  Past Medical History:   Diagnosis Date    Anemia of chronic disease 5/15/2022    Attention deficit hyperactivity disorder (ADHD), predominantly inattentive type 7/15/2022    Cachexia 7/15/2022    Depression with anxiety 7/15/2022    Hypercalcemia 5/15/2022    Iron deficiency anemia 5/15/2022    Lung cancer     Pleural effusion on right 2022    Pure hypercholesterolemia 10/27/2022    SIADH (syndrome of inappropriate ADH production) 5/15/2022    Squamous cell carcinoma of lung 3/21/2022    Tobacco user 7/15/2022   Past medical history: Attention deficit disorder. Tobacco abuse. Tonsillectomy.  Social history: .  Lives in [place], [place].  Has 2 children.  Has worked as a  all his life (more than 30-35 years).  Smoked 1-1/2 packs of cigarettes daily for 15 years; then, 1 pack daily for 1 year; now, for last 2 months, 2 cigarettes daily.  Heavy alcohol use in the past; 8-10 beers daily for 10 years; quit 1-1/2 years ago.  No illicit drugs.  Family history: No family members with cancer.  Health maintenance: So far, does not have a primary care physician.  Apparently, he is going to see a primary care physician for the first time next week.  Past Surgical History:   Procedure Laterality Date    Bronchoscopy w endobronchial biopsy  2021    Endobronchial ablation of left main stem occlusion  2021    MEDIPORT INSERTION, SINGLE  10/11/2021    TONSILLECTOMY  1981    US guided right thoracentesis Right 2022      Social History     Socioeconomic History    Marital status:      Spouse name: Veronica Pratt    Number of children: 2   Occupational History    Occupation:    Tobacco Use    Smoking status: Former     Packs/day: 1.50     Types: Cigarettes     Start date: 1990     Quit date: 2021     Years since quittin.3    Smokeless tobacco: Never   Substance and Sexual Activity    Alcohol use: Yes    Drug use: Never    Sexual activity:  Yes      Family History   Problem Relation Age of Onset    ALS Mother 40    Nephrolithiasis Father     Cholecystitis Father     Psychosis Sister     Diabetes type II Son       Reason for Follow-up:   -Squamous cell carcinoma of lung, stage III  -right-sided exudative pleural effusion (S/P thoracentesis 02/15/2023)   -Microcytic anemia (relative iron deficiency)   -Hypercalcemia of malignancy   -Thrombocytosis   -Hyponatremia (SIADH)       History of Present Illness:   No chief complaint on file.        Oncologic/Hematologic History:  Oncology History   Squamous cell carcinoma of lung   1/25/2022 -  Chemotherapy    Treatment Summary   Plan Name: OP DURVALUMAB 1500 MG Q4W  Treatment Goal: Control  Status: Active  Start Date: 1/25/2022  End Date: 12/6/2022  Provider: Brendan Amaral MD  Chemotherapy: [No matching medication found in this treatment plan]       3/21/2022 Initial Diagnosis    Squamous cell carcinoma of lung       5/15/2022 Cancer Staged    Staging form: Lung, AJCC 8th Edition  - Clinical stage from 5/15/2022: Stage IIIB (cT4, cN2, cM0)       Patient is being followed for history of squamous cell carcinoma of lung, stage IIIB or stage IIIC, treated with chemoradiation therapy, followed by consolidation durvalumab.      Please see assessment and plan section for details.     09/20/2021:   Presents for initial medical oncology consultation      Interval History:  PORT FLUSH   OP DURVALUMAB 1500 MG Q4W   05/16/2023:   -05/09/2023: Surveillance CT chest with contrast (comparison: CT chest 02/06/2023):    1. Overall very similar appearance to the previous exam with minor interval change as detailed below.  2. Persistent bronchial wall thickening with luminal narrowing at the right mainstem bronchus.  Post treatment change with loss of normal fat planes at the right hilum, unchanged compared to previous.  3. Persistent volume loss with peribronchovascular opacities in the perihilar right middle and upper  lobes with mild increased atelectatic change at the right lung apex with collapse of previously seen area of apical bullous change.  4. Persistent mild to moderate right pleural effusion, decreased in volume from previous exam    Labs reviewed.    Presents for follow-up visit.  Doing well.  No complaints.  .  No weakness or fatigue.  No chest pain, cough, dyspnea, hemoptysis, unusual headaches, focal neurological symptoms, anorexia, unintentional weight loss, or any lumps or lymphadenopathy.  Quit smoking in August 2021.  ECOG 0.      Medications:  Current Outpatient Medications on File Prior to Visit   Medication Sig Dispense Refill    cloNIDine (CATAPRES) 0.1 MG tablet Take 1 tablet (0.1 mg total) by mouth nightly. 30 tablet 3    EPINEPHrine (EPIPEN 2-SUSANA) 0.3 mg/0.3 mL AtIn Inject 0.3 mLs (0.3 mg total) into the muscle once. for 1 dose 0.3 mL 3    gabapentin (NEURONTIN) 300 MG capsule Take 1 capsule (300 mg total) by mouth 3 (three) times daily. 90 capsule 11    predniSONE (DELTASONE) 10 MG tablet Take 1 tablet (10 mg total) by mouth daily as needed (wasp sting). 10 tablet 4    sertraline (ZOLOFT) 50 MG tablet Take 1 tablet (50 mg total) by mouth once daily. 30 tablet 3     No current facility-administered medications on file prior to visit.       Review of Systems:   All systems reviewed and found to be negative except for the symptoms detailed above    Physical Examination:   VITAL SIGNS:   There were no vitals filed for this visit.    GENERAL:  In no apparent distress.    HEAD:  No signs of head trauma.  EYES:  Pupils are equal.  Extraocular motions intact.    EARS:  Hearing grossly intact.  MOUTH:  Oropharynx is normal.   NECK:  No adenopathy, no JVD.     CHEST:  Chest with clear breath sounds bilaterally.  No wheezes, rales, rhonchi.    CARDIAC:  Regular rate and rhythm.  S1 and S2, without murmurs, gallops, rubs.  VASCULAR:  No Edema.  Peripheral pulses normal and equal in all  extremities.  ABDOMEN:  Soft, without detectable tenderness.  No sign of distention.  No   rebound or guarding, and no masses palpated.   Bowel Sounds normal.  MUSCULOSKELETAL:  Good range of motion of all major joints. Extremities without clubbing, cyanosis or edema.    NEUROLOGIC EXAM:  Alert and oriented x 3.  No focal sensory or strength deficits.   Speech normal.  Follows commands.  PSYCHIATRIC:  Mood normal.    No results for input(s): CBC in the last 72 hours.   No results for input(s): CMP in the last 72 hours.     Assessment:  Problem List Items Addressed This Visit    None    Presented with locally advanced, obviously inoperable squamous cell carcinoma of lung;  large, 10 cm mediastinal mass, possibly arising in the right lower lung lobe, causing compressive effects on right mainstem bronchus and right pulmonary artery;  no metastases;  s/p bronchoscopic biopsy of carinal mass 08/18/2021;  large fungating mass at maximiliano, obliterating the right mainstem bronchus and partially occluding left mainstem;  causing hyponatremia, hypercalcemia;  worsening dyspnea, fever, postobstructive pneumonia     Squamous cell carcinoma of lung:  -bronchoscopy 08/18/2021  -10 cm subcarinal mass with compressive effects, right lower lung lobe large masslike consolidation with central cavitation, large fungating mass at maximiliano, obliterating the right mainstem bronchus and partially occluding left mainstem  -cT4 cN2 M0, stage IIIB  -postobstructive pneumonia and hospitalization  -S/P laser [facility]ulking [facility] 08/27/2021  -hyponatremia secondary to SIADH  -hypercalcemia  -S/P palliative radiotherapy 09/2021, pending definitive concurrent chemoradiation therapy, with subjective and objective improvement  -Followed by sequential chemotherapy with cisplatin/docetaxel q3 weeks x4 cycles (10/2021-12/2021)  >>with considerable improvement  -followed by consolidation durvalumab every 4 weeks X 12 cycles (01/25/2022-12/06/2022)    -no recurrence or metastases on surveillance CT chest without contrast 02/06/2023  -slightly larger right pleural effusion on surveillance chest CT without contrast 02/06/2023  -02/15/2023:  Ultrasound-guided right thoracentesis:  700 cc fluid removed  (exudative pleural effusion) (it appears that cytology was never sent for despite orders)  -no recurrence or metastases on surveillance chest CT with contrast 05/09/2023       Molecular markers:  -EGFR mutation negative; PD-L1 negative (TPS 0%); KRAS mutation negative;  -ALK gene rearrangement negative; ROS1 gene rearrangement negative; NTRK NGS fusion panel negative  -BRAF mutation negative; RET gene rearrangement not detected         Microcytic anemia (relative iron deficiency +/- anemia of chronic disease/malignancy):   -Hemoglobin 6.8-9.3   -MCV 79.7   -MCH, MCHC low   -Stool for occult blood negative x2 (09/2021)   -B12 normal, 755 (09/06/2021)   -Serum iron 9, TIBC 157, transferrin saturation 6%, ferritin 795.5 (09/06/2021)   -Folate low, 6.5 (09/06/2021   -09/30/2021: Serum iron 24, TIBC 220, transferrin saturation 11%, ferritin 714.65 (anemia of chronic disease/relative iron deficiency); B12 level 1046; RBC folate 1147; hemoglobin 10.5         Hypercalcemia of malignancy:   Calcium level:   -08/06/2021: 10.3 (albumin 2.5)   -9.6-10.2 between 08/07/2021-09/08/2021   -09/09/2021: 10.5 (albumin 1.8)   -09/12/2021: 10.6 (albumin 2.2)   -09/21/2021: 8.2 (albumin 2.1)   -08/07/2021: Intact PTH level normal, 12.7; intact PTH level <2.0   -09/10/2021: PTH needed peptide <2.0   -Zometa 4 mg IV x1 (09/12/2021) >> hypercalcemia resolved    -09/30/2021: Ionized calcium level 5.1, normal (Serum calcium 9.3, albumin 2.6, corrected calcium 10.42)         Thrombocytosis:   (Subsequently, spontaneously resolved)   -Platelet count 413-588K   -Almost certainly, secondary to underlying malignancy and relative iron deficiency   -09/30/2021: ELDON-2 mutation negative   -10/08/2021:  BCR-ABL1 1 fusion transcripts negative         Hyponatremia secondary to SIADH:  -Sodium 128-130  -Treated with fluid restriction, salt tablets (by nephrology)        Plan:  -S/P consolidation durvalumab every 4 weeks X 12 cycles (01/25/2022-12/06/2022)   -no recurrence or metastases on surveillance CT chest without contrast 02/06/2023  -slightly larger right pleural effusion on surveillance chest CT without contrast 02/06/2023  -02/15/2023:  Ultrasound-guided right thoracentesis:  700 cc fluid removed  (exudative pleural effusion) (it appears that cytology was never sent for despite orders)  -no recurrence or metastases on surveillance chest CT with contrast 05/09/2023  >>>  Continue surveillance (see below)    -03/03/2023: Anxiety and mood swings; no suicidal or homicidal ideation; no psychotic symptoms; follow-up with behavioral health    Continue surveillance for stage III non-small cell lung cancer, treated with chemoradiation therapy  -Completed radiotherapy, followed by sequential chemotherapy 12/2021  -History and physical and chest CT +/- contrast every 3 months for 3 years (12/2021-12/2024), then every 6 months for 2 years, then history and physical and low-dose noncontrast enhanced chest CT annually  -no recurrence or metastases on surveillance chest CT with contrast 05/09/2023  -no recurrence or metastases on surveillance chest CT with contrast 05/09/2023   >>>  Repeat noncontrast chest CT for surveillance in 3 months (August)     -Microcytic anemia  -Anemia of chronic disease/relative iron deficiency  -not an issue at this time      Hypercalcemia of malignancy   -S/p Zometa 4 mg IV x1 (09/12/2021)     Thrombocytosis; reactive; subsequently, spontaneously resolved     Hyponatremia secondary to SIADH secondary to malignancy  -Managed by renal; managed with fluid restriction  -resolved     Follow-up in 3 months (August) with surveillance noncontrast chest CT, CBC, CMP; earlier, if any concerns in the  interim.       Above discussed with him.  All questions answered.  Discussed labs and scans and gave him copies of relevant report.  He understands and agrees with this plan.      Follow-up:  No follow-ups on file.

## 2023-05-15 ENCOUNTER — OFFICE VISIT (OUTPATIENT)
Dept: BEHAVIORAL HEALTH | Facility: CLINIC | Age: 48
End: 2023-05-15
Payer: MEDICAID

## 2023-05-15 VITALS
HEART RATE: 82 BPM | TEMPERATURE: 97 F | BODY MASS INDEX: 21.2 KG/M2 | SYSTOLIC BLOOD PRESSURE: 126 MMHG | DIASTOLIC BLOOD PRESSURE: 76 MMHG | WEIGHT: 124.19 LBS | HEIGHT: 64 IN

## 2023-05-15 DIAGNOSIS — F41.8 DEPRESSION WITH ANXIETY: Primary | Chronic | ICD-10-CM

## 2023-05-15 DIAGNOSIS — F41.9 ANXIETY: ICD-10-CM

## 2023-05-15 DIAGNOSIS — F90.0 ATTENTION DEFICIT HYPERACTIVITY DISORDER (ADHD), PREDOMINANTLY INATTENTIVE TYPE: Chronic | ICD-10-CM

## 2023-05-15 DIAGNOSIS — F90.9 ADULT ADHD: ICD-10-CM

## 2023-05-15 PROCEDURE — 99213 PR OFFICE/OUTPT VISIT, EST, LEVL III, 20-29 MIN: ICD-10-PCS | Mod: SA,HB,S$PBB, | Performed by: NURSE PRACTITIONER

## 2023-05-15 PROCEDURE — 1159F PR MEDICATION LIST DOCUMENTED IN MEDICAL RECORD: ICD-10-PCS | Mod: CPTII,,, | Performed by: NURSE PRACTITIONER

## 2023-05-15 PROCEDURE — 1160F PR REVIEW ALL MEDS BY PRESCRIBER/CLIN PHARMACIST DOCUMENTED: ICD-10-PCS | Mod: CPTII,,, | Performed by: NURSE PRACTITIONER

## 2023-05-15 PROCEDURE — 99213 OFFICE O/P EST LOW 20 MIN: CPT | Mod: PBBFAC,PN | Performed by: NURSE PRACTITIONER

## 2023-05-15 PROCEDURE — 3074F PR MOST RECENT SYSTOLIC BLOOD PRESSURE < 130 MM HG: ICD-10-PCS | Mod: CPTII,,, | Performed by: NURSE PRACTITIONER

## 2023-05-15 PROCEDURE — 1160F RVW MEDS BY RX/DR IN RCRD: CPT | Mod: CPTII,,, | Performed by: NURSE PRACTITIONER

## 2023-05-15 PROCEDURE — 3008F BODY MASS INDEX DOCD: CPT | Mod: CPTII,,, | Performed by: NURSE PRACTITIONER

## 2023-05-15 PROCEDURE — 3074F SYST BP LT 130 MM HG: CPT | Mod: CPTII,,, | Performed by: NURSE PRACTITIONER

## 2023-05-15 PROCEDURE — 3078F DIAST BP <80 MM HG: CPT | Mod: CPTII,,, | Performed by: NURSE PRACTITIONER

## 2023-05-15 PROCEDURE — 1159F MED LIST DOCD IN RCRD: CPT | Mod: CPTII,,, | Performed by: NURSE PRACTITIONER

## 2023-05-15 PROCEDURE — 3008F PR BODY MASS INDEX (BMI) DOCUMENTED: ICD-10-PCS | Mod: CPTII,,, | Performed by: NURSE PRACTITIONER

## 2023-05-15 PROCEDURE — 99213 OFFICE O/P EST LOW 20 MIN: CPT | Mod: SA,HB,S$PBB, | Performed by: NURSE PRACTITIONER

## 2023-05-15 PROCEDURE — 3078F PR MOST RECENT DIASTOLIC BLOOD PRESSURE < 80 MM HG: ICD-10-PCS | Mod: CPTII,,, | Performed by: NURSE PRACTITIONER

## 2023-05-15 RX ORDER — CLONIDINE HYDROCHLORIDE 0.1 MG/1
0.1 TABLET ORAL NIGHTLY
Qty: 30 TABLET | Refills: 3 | Status: SHIPPED | OUTPATIENT
Start: 2023-05-15 | End: 2023-07-13 | Stop reason: SDUPTHER

## 2023-05-15 RX ORDER — SERTRALINE HYDROCHLORIDE 100 MG/1
100 TABLET, FILM COATED ORAL DAILY
Qty: 30 TABLET | Refills: 3 | Status: SHIPPED | OUTPATIENT
Start: 2023-05-15 | End: 2023-07-13 | Stop reason: SDUPTHER

## 2023-05-15 NOTE — PROGRESS NOTES
Follow-up #1  05/15/2023  HPI: Deepak Pratt is a 48 y.o. male here today for a psychiatric evaluation referred by PCP to the [facility] for depression and anxiety  Past Medical History: Squamous cell carcinoma of lung, Tobacco user Anemia of chronic disease, Cachexia, Hypercalcemia, Iron deficiency anemia, Pleural effusion on right, Pure hypercholesterolemia, SIADH (syndrome of inappropriate ADH production), ADHD, predominantly inattentive type, Depression with anxiety    On his initial visit, patient was started on Clonidine 0.1mg at bedtime for both anxiety and inattention and Zoloft 25mg at bedtime x 2 weeks and then increase to 50mg at bedtime.    Today, patient states that he is doing much better. He still has some anxiety but not as much.     States that he is having an easier time going back to work. And while at work, he is not concentrating on how quickly he can finish and get back home.   He is getting out more. He and his family went to [place] for his [employer] graduation. States that prior to starting the Zoloft and Clonidine, he wanted to go to [place] and come right back home. But, he found that he was able to enjoy the trip and they made a vacation out of the trip.   He is able to talk about his health without becoming tearful. He is feeling more positive.     Will increase the Zoloft to 100mg at HS and continue the Clonidine 0.1mg q HS.     FU in 6 weeks    PHQ Score:   05/15/2023: 1  03/24/2023: 5    DELIO-7 Score:   05/15/2023: 3   03/24/2023: 10    Mental Status Evaluation:  Appearance:  age appropriate, casually dressed, neatly groomed   Behavior:  normal, cooperative   Speech:  no latency; no press   Mood:  euthymic   Affect:  mood-congruent   Thought Process:  normal and logical   Thought Content:  normal, no suicidality, no homicidality, delusions, or paranoia   Sensorium:  grossly intact   Cognition:  grossly intact   Insight:  intact   Judgment:  behavior is adequate to circumstances      Impression:  Anxiety/Separation Anxiety  2. MDD  3. ADHD - inattentive    Plan:  Clonidine 0.1mg at bedtime for both anxiety and inattention  2. Increase Zoloft to 100mg at HS for depression and anxiety  3. Continue 1:1 therapy   4. Follow-up in 6 weeks    Initial Interview  2023  HPI: Deepak Pratt is a 48 y.o. 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 He Once he gets to work, he is fine but finds it difficult to leave to go to work. He feels that he is lacking confidence.  He cannot identify triggers except that anxiety begins the day before (around 1200) he is to go to a job. He starts worrying about going to work the next day and becomes sweaty.   He states that he is the  and is worried about the job getting done correctly and on time.   Onset 2022 when he broke his ankle and could not go to work that day.      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   He was on Adderall at the time because he was inattentive and unorganized.    He does not want to get back on Adderall. He felt that it was working great but his wife states that his attitude was bad. He feels that he may have been addicted. The doses were getting higher and higher.     He explains that when he was originally diagnosed with cancer, he was given 2 months to two years to live. He lost 40lbs and was planning his . States that he did very well with chemo and  radiation and now should live into old age. States that he should be happy but that he still gets very emotional.   When he was told about his diagnoses, his was never given any therapy OR he thinks that he may have refused it. His wife told him that he needed therapy but he refused.    He states that he had started to accept the fact that he might die.   Mainly he was worried about his family having to live through his death.   He believes in a higher power.   He believes that there might be something after death but not sure what.     He wanted to be cremated.   He did not know whether or not death was going to be painful. He did not think about it too much.   He is not concerned about getting hurt at work.     May have some separation anxiety??    He does not have the energy that he used to prior to his cancer diagnosis. He says that he has a lot of people around to help him but he feels guilty for not having the energy or the will/desire.  He feels a little less than the man that he was.     He had a good appetite and sleeps well.     Will try Clonidine 0.1mg at HS to address both anxiety and inattention.   Will also start Zoloft 25mg every night to address anxiety and depression.     Medications:   Current Outpatient Medications   Medication Instructions    diclofenac sodium (VOLTAREN) 2 g, Topical (Top), 4 times daily PRN, Do not exceed 32 grams/day: do not to exceed 8 grams/day/single joint of upper extremities; do not to exceed 16 grams/day/single joint of lower extremities.  Please request refill of this medication from your PCP.    EPINEPHrine (EPIPEN 2-SUSANA) 0.3 mg, Intramuscular, Once    predniSONE (DELTASONE) 10 mg, Oral, Daily PRN        Psychiatric History:   Reports a prior psychiatric history: depression and anxiety  History of mental health out-patient treatment:   History of in-patient psychiatric hospitalization: denies  History of suicidal ideations: denies  History of suicidal threats:   History  of suicide attempts: denies  History of self mutilation:     History of psychotropic medications:   Buspar 7.5mg - gave him a sharp/strong buzz for about 30minutes and then nothing  Adderall - for ADD    Family Psychiatric History:  Mental Illness: mother Bipolar Disorder  Alcohol abuse/addiction:   Drug addiction:     Substance Use History:  Alcohol: Began drinking at 15 or 17 y/o. Hx 6-8 beers daily for 10 years.  Stopped drinking about 3 months prior to cancer diagnosis  Marijuana: denies  Benzodiazepines: denies  Opiates: denies  Stimulants: Adderall x15  years in the past - 90mg a day in the end. Stopped takikng the Adderall a month prior to being diagnosed with cancer  Cocaine: denies  Methamphetamine: denies  Nicotine: Stopped smoking cigarettes August 2021--had been using 1.5 packs/daily.  Began smoking at 14 y/o  Caffeine:    Social History:   Grew up in: [place]  Raised by: parents  Number of siblings: one sister  Education: HS grad  Employment: PT construction  Marital Status:  x 29 years  Children: 30yo son and a 28yo son  Living situation: lives in a house with his wife  [employer] affiliation:     Trauma History:  History of Emotional/Mental abuse: denies  History of Physical abuse: denies  History of Sexual abuse: denies  History of other trauma: being given a CA diagnosis and  told that he had 2months to 2 years to live    Legal History:  Legal history: denies  Denies being on probation or parole  Denies any upcoming court dates  Denies any pending charges.    PHQ Score:   03/24/2023: 5    DELIO-7 Score:   03/24/2023: 10    Mental Status Evaluation:  Appearance:  age appropriate, casually dressed, neatly groomed   Behavior:  normal, cooperative   Speech:  no latency; no press   Mood:  anxious, dysthymic   Affect:  mood-congruent   Thought Process:  normal and logical   Thought Content:  normal, no suicidality, no homicidality, delusions, or paranoia   Sensorium:  grossly intact   Cognition:   grossly intact   Insight:  intact   Judgment:  behavior is adequate to circumstances     Impression:  Anxiety/Separation Anxiety  2. MDD  3. ADHD - inattentive    Plan:  Clonidine 0.1mg at bedtime for both anxiety and inattention  2. Zoloft 25mg at bedtime x 2 weeks and then increase to 50mg at bedtime for depression and anxiety  3. Continue 1:1 therapy   4. Follow-up in 6 weeks

## 2023-05-15 NOTE — PATIENT INSTRUCTIONS
Continue Clonidine 0.1mg at bedtime for both anxiety and inattention  2. Increase Zoloft to 100mg at HS for depression and anxiety  3. Continue 1:1 therapy   4. Follow-up in 6 weeks

## 2023-05-16 ENCOUNTER — INFUSION (OUTPATIENT)
Dept: INFUSION THERAPY | Facility: HOSPITAL | Age: 48
End: 2023-05-16
Attending: INTERNAL MEDICINE
Payer: MEDICAID

## 2023-05-16 ENCOUNTER — OFFICE VISIT (OUTPATIENT)
Dept: HEMATOLOGY/ONCOLOGY | Facility: CLINIC | Age: 48
End: 2023-05-16
Attending: INTERNAL MEDICINE
Payer: MEDICAID

## 2023-05-16 VITALS
BODY MASS INDEX: 21.17 KG/M2 | TEMPERATURE: 98 F | HEIGHT: 64 IN | SYSTOLIC BLOOD PRESSURE: 123 MMHG | OXYGEN SATURATION: 98 % | DIASTOLIC BLOOD PRESSURE: 85 MMHG | RESPIRATION RATE: 20 BRPM | HEART RATE: 106 BPM | WEIGHT: 124 LBS

## 2023-05-16 VITALS
DIASTOLIC BLOOD PRESSURE: 83 MMHG | BODY MASS INDEX: 21.27 KG/M2 | WEIGHT: 124.56 LBS | SYSTOLIC BLOOD PRESSURE: 127 MMHG | OXYGEN SATURATION: 96 % | HEIGHT: 64 IN | TEMPERATURE: 99 F | RESPIRATION RATE: 24 BRPM | HEART RATE: 116 BPM

## 2023-05-16 DIAGNOSIS — Z85.118 ENCOUNTER FOR FOLLOW-UP SURVEILLANCE OF LUNG CANCER: ICD-10-CM

## 2023-05-16 DIAGNOSIS — Z72.0 TOBACCO USER: Chronic | ICD-10-CM

## 2023-05-16 DIAGNOSIS — F41.8 DEPRESSION WITH ANXIETY: Chronic | ICD-10-CM

## 2023-05-16 DIAGNOSIS — C34.90 MALIGNANT NEOPLASM OF UNSPECIFIED PART OF UNSPECIFIED BRONCHUS OR LUNG: ICD-10-CM

## 2023-05-16 DIAGNOSIS — R64 CACHEXIA: Chronic | ICD-10-CM

## 2023-05-16 DIAGNOSIS — C34.90 SQUAMOUS CELL CARCINOMA OF LUNG, UNSPECIFIED LATERALITY: Primary | Chronic | ICD-10-CM

## 2023-05-16 DIAGNOSIS — E22.2 SIADH (SYNDROME OF INAPPROPRIATE ADH PRODUCTION): Chronic | ICD-10-CM

## 2023-05-16 DIAGNOSIS — J90 PLEURAL EFFUSION ON RIGHT: Chronic | ICD-10-CM

## 2023-05-16 DIAGNOSIS — Z08 ENCOUNTER FOR FOLLOW-UP SURVEILLANCE OF LUNG CANCER: ICD-10-CM

## 2023-05-16 DIAGNOSIS — J90 EXUDATIVE PLEURAL EFFUSION: Chronic | ICD-10-CM

## 2023-05-16 DIAGNOSIS — E83.52 HYPERCALCEMIA: Chronic | ICD-10-CM

## 2023-05-16 DIAGNOSIS — C34.90 SQUAMOUS CELL CARCINOMA OF LUNG, UNSPECIFIED LATERALITY: Primary | ICD-10-CM

## 2023-05-16 DIAGNOSIS — D50.8 OTHER IRON DEFICIENCY ANEMIA: Chronic | ICD-10-CM

## 2023-05-16 PROCEDURE — 99213 PR OFFICE/OUTPT VISIT, EST, LEVL III, 20-29 MIN: ICD-10-PCS | Mod: S$PBB,,, | Performed by: INTERNAL MEDICINE

## 2023-05-16 PROCEDURE — 63600175 PHARM REV CODE 636 W HCPCS: Performed by: INTERNAL MEDICINE

## 2023-05-16 PROCEDURE — 99213 OFFICE O/P EST LOW 20 MIN: CPT | Mod: S$PBB,,, | Performed by: INTERNAL MEDICINE

## 2023-05-16 PROCEDURE — 96523 IRRIG DRUG DELIVERY DEVICE: CPT

## 2023-05-16 PROCEDURE — 25000003 PHARM REV CODE 250: Performed by: INTERNAL MEDICINE

## 2023-05-16 PROCEDURE — 3008F PR BODY MASS INDEX (BMI) DOCUMENTED: ICD-10-PCS | Mod: CPTII,,, | Performed by: INTERNAL MEDICINE

## 2023-05-16 PROCEDURE — 3079F DIAST BP 80-89 MM HG: CPT | Mod: CPTII,,, | Performed by: INTERNAL MEDICINE

## 2023-05-16 PROCEDURE — 1159F MED LIST DOCD IN RCRD: CPT | Mod: CPTII,,, | Performed by: INTERNAL MEDICINE

## 2023-05-16 PROCEDURE — 1159F PR MEDICATION LIST DOCUMENTED IN MEDICAL RECORD: ICD-10-PCS | Mod: CPTII,,, | Performed by: INTERNAL MEDICINE

## 2023-05-16 PROCEDURE — 99213 OFFICE O/P EST LOW 20 MIN: CPT | Mod: PBBFAC | Performed by: INTERNAL MEDICINE

## 2023-05-16 PROCEDURE — 1160F PR REVIEW ALL MEDS BY PRESCRIBER/CLIN PHARMACIST DOCUMENTED: ICD-10-PCS | Mod: CPTII,,, | Performed by: INTERNAL MEDICINE

## 2023-05-16 PROCEDURE — 3008F BODY MASS INDEX DOCD: CPT | Mod: CPTII,,, | Performed by: INTERNAL MEDICINE

## 2023-05-16 PROCEDURE — 3079F PR MOST RECENT DIASTOLIC BLOOD PRESSURE 80-89 MM HG: ICD-10-PCS | Mod: CPTII,,, | Performed by: INTERNAL MEDICINE

## 2023-05-16 PROCEDURE — 3074F PR MOST RECENT SYSTOLIC BLOOD PRESSURE < 130 MM HG: ICD-10-PCS | Mod: CPTII,,, | Performed by: INTERNAL MEDICINE

## 2023-05-16 PROCEDURE — 1160F RVW MEDS BY RX/DR IN RCRD: CPT | Mod: CPTII,,, | Performed by: INTERNAL MEDICINE

## 2023-05-16 PROCEDURE — 3074F SYST BP LT 130 MM HG: CPT | Mod: CPTII,,, | Performed by: INTERNAL MEDICINE

## 2023-05-16 PROCEDURE — A4216 STERILE WATER/SALINE, 10 ML: HCPCS | Performed by: INTERNAL MEDICINE

## 2023-05-16 RX ORDER — HEPARIN 100 UNIT/ML
500 SYRINGE INTRAVENOUS
Status: CANCELLED | OUTPATIENT
Start: 2023-05-16

## 2023-05-16 RX ORDER — SODIUM CHLORIDE 0.9 % (FLUSH) 0.9 %
10 SYRINGE (ML) INJECTION
Status: CANCELLED | OUTPATIENT
Start: 2023-05-16

## 2023-05-16 RX ORDER — SODIUM CHLORIDE 0.9 % (FLUSH) 0.9 %
10 SYRINGE (ML) INJECTION
Status: DISCONTINUED | OUTPATIENT
Start: 2023-05-16 | End: 2023-05-16 | Stop reason: HOSPADM

## 2023-05-16 RX ORDER — HEPARIN 100 UNIT/ML
500 SYRINGE INTRAVENOUS
Status: DISCONTINUED | OUTPATIENT
Start: 2023-05-16 | End: 2023-05-16 | Stop reason: HOSPADM

## 2023-05-16 RX ADMIN — HEPARIN 500 UNITS: 100 SYRINGE at 12:05

## 2023-05-16 RX ADMIN — Medication 10 ML: at 12:05

## 2023-05-16 NOTE — Clinical Note
Orders for today:   Repeat noncontrast chest CT for surveillance in August, then follow-up visit with CBC and CMP; earlier, if any concerns.

## 2023-05-16 NOTE — NURSING
1244  Pt did labs and is here for MP flush before provider visit.  Denies any pain or complaint.  HR elevated 116 and pt states he is nervous about MP stick.

## 2023-06-15 ENCOUNTER — OFFICE VISIT (OUTPATIENT)
Dept: URGENT CARE | Facility: CLINIC | Age: 48
End: 2023-06-15
Payer: MEDICAID

## 2023-06-15 VITALS
OXYGEN SATURATION: 97 % | HEART RATE: 96 BPM | HEIGHT: 64 IN | DIASTOLIC BLOOD PRESSURE: 89 MMHG | TEMPERATURE: 99 F | WEIGHT: 123 LBS | SYSTOLIC BLOOD PRESSURE: 142 MMHG | BODY MASS INDEX: 21 KG/M2 | RESPIRATION RATE: 18 BRPM

## 2023-06-15 DIAGNOSIS — J30.1 ALLERGIC RHINITIS DUE TO POLLEN, UNSPECIFIED SEASONALITY: ICD-10-CM

## 2023-06-15 DIAGNOSIS — R05.9 COUGH, UNSPECIFIED TYPE: Primary | ICD-10-CM

## 2023-06-15 LAB
CTP QC/QA: YES
SARS-COV-2 RDRP RESP QL NAA+PROBE: NEGATIVE

## 2023-06-15 PROCEDURE — 99213 PR OFFICE/OUTPT VISIT, EST, LEVL III, 20-29 MIN: ICD-10-PCS | Mod: S$PBB,,, | Performed by: NURSE PRACTITIONER

## 2023-06-15 PROCEDURE — 87635 SARS-COV-2 COVID-19 AMP PRB: CPT | Mod: PBBFAC | Performed by: NURSE PRACTITIONER

## 2023-06-15 PROCEDURE — 99213 OFFICE O/P EST LOW 20 MIN: CPT | Mod: S$PBB,,, | Performed by: NURSE PRACTITIONER

## 2023-06-15 PROCEDURE — 99214 OFFICE O/P EST MOD 30 MIN: CPT | Mod: PBBFAC | Performed by: NURSE PRACTITIONER

## 2023-06-15 RX ORDER — FLUTICASONE PROPIONATE 50 MCG
2 SPRAY, SUSPENSION (ML) NASAL DAILY
Qty: 16 G | Refills: 2 | Status: SHIPPED | OUTPATIENT
Start: 2023-06-15 | End: 2023-10-30

## 2023-06-15 RX ORDER — LORATADINE 10 MG/1
10 TABLET ORAL DAILY
Qty: 30 TABLET | Refills: 0 | Status: SHIPPED | OUTPATIENT
Start: 2023-06-15 | End: 2023-10-30

## 2023-06-15 NOTE — PROGRESS NOTES
"Subjective:      Patient ID: Deepak Pratt is a 48 y.o. male.    Vitals:  height is 5' 4" (1.626 m) and weight is 55.8 kg (123 lb). His oral temperature is 98.5 °F (36.9 °C). His blood pressure is 142/89 (abnormal) and his pulse is 96. His respiration is 18 and oxygen saturation is 97%.     Chief Complaint: Cough and Nasal Congestion    Cough      Respiratory:  Positive for cough.     Objective:     Physical Exam   Constitutional: He does not appear ill.   HENT:   Head: Normocephalic.   Ears:   Right Ear: Tympanic membrane normal.   Left Ear: Tympanic membrane normal.   Nose: Congestion present.   Mouth/Throat: Posterior oropharyngeal erythema present. No oropharyngeal exudate.   Eyes: Conjunctivae are normal.   Cardiovascular: Normal rate and normal pulses.   Pulmonary/Chest: Effort normal and breath sounds normal.   Abdominal: Normal appearance.   Lymphadenopathy:     He has no cervical adenopathy.   Neurological: no focal deficit. He is alert. No cranial nerve deficit.   Skin: Skin is warm, dry and not diaphoretic.   Psychiatric: His behavior is normal. Mood, judgment and thought content normal.   Nursing note and vitals reviewed.    Assessment:     1. Cough, unspecified type    2. Allergic rhinitis due to pollen, unspecified seasonality      Results for orders placed or performed in visit on 06/15/23   POCT COVID-19 Rapid Screening   Result Value Ref Range    POC Rapid COVID Negative Negative     Acceptable Yes        Plan:   Use warm salt water gargles to throat to help with pain.   - OTC cold/flu products as desired for symptoms  - Plenty of fluids  - Home from work/school  - Tylenol or Motrin for pain/fever  - COVID test NEGATIVE  Cough, unspecified type  -     POCT COVID-19 Rapid Screening    Allergic rhinitis due to pollen, unspecified seasonality  -     POCT COVID-19 Rapid Screening    Other orders  -     fluticasone propionate (FLONASE) 50 mcg/actuation nasal spray; 2 sprays (100 mcg total) " by Each Nostril route once daily.  Dispense: 16 g; Refill: 2  -     loratadine (CLARITIN) 10 mg tablet; Take 1 tablet (10 mg total) by mouth once daily.  Dispense: 30 tablet; Refill: 0

## 2023-07-13 ENCOUNTER — OFFICE VISIT (OUTPATIENT)
Dept: BEHAVIORAL HEALTH | Facility: CLINIC | Age: 48
End: 2023-07-13
Payer: MEDICAID

## 2023-07-13 VITALS
BODY MASS INDEX: 21.56 KG/M2 | WEIGHT: 126.31 LBS | TEMPERATURE: 98 F | HEIGHT: 64 IN | DIASTOLIC BLOOD PRESSURE: 98 MMHG | SYSTOLIC BLOOD PRESSURE: 130 MMHG | HEART RATE: 92 BPM

## 2023-07-13 DIAGNOSIS — F41.9 ANXIETY: ICD-10-CM

## 2023-07-13 DIAGNOSIS — F41.8 DEPRESSION WITH ANXIETY: Primary | Chronic | ICD-10-CM

## 2023-07-13 DIAGNOSIS — F90.0 ATTENTION DEFICIT HYPERACTIVITY DISORDER (ADHD), PREDOMINANTLY INATTENTIVE TYPE: Chronic | ICD-10-CM

## 2023-07-13 DIAGNOSIS — F90.9 ADULT ADHD: ICD-10-CM

## 2023-07-13 PROCEDURE — 1160F PR REVIEW ALL MEDS BY PRESCRIBER/CLIN PHARMACIST DOCUMENTED: ICD-10-PCS | Mod: CPTII,,, | Performed by: NURSE PRACTITIONER

## 2023-07-13 PROCEDURE — 99212 OFFICE O/P EST SF 10 MIN: CPT | Mod: SA,HB,S$PBB, | Performed by: NURSE PRACTITIONER

## 2023-07-13 PROCEDURE — 1159F PR MEDICATION LIST DOCUMENTED IN MEDICAL RECORD: ICD-10-PCS | Mod: CPTII,,, | Performed by: NURSE PRACTITIONER

## 2023-07-13 PROCEDURE — 1159F MED LIST DOCD IN RCRD: CPT | Mod: CPTII,,, | Performed by: NURSE PRACTITIONER

## 2023-07-13 PROCEDURE — 3080F PR MOST RECENT DIASTOLIC BLOOD PRESSURE >= 90 MM HG: ICD-10-PCS | Mod: CPTII,,, | Performed by: NURSE PRACTITIONER

## 2023-07-13 PROCEDURE — 3008F BODY MASS INDEX DOCD: CPT | Mod: CPTII,,, | Performed by: NURSE PRACTITIONER

## 2023-07-13 PROCEDURE — 3075F PR MOST RECENT SYSTOLIC BLOOD PRESS GE 130-139MM HG: ICD-10-PCS | Mod: CPTII,,, | Performed by: NURSE PRACTITIONER

## 2023-07-13 PROCEDURE — 99212 PR OFFICE/OUTPT VISIT, EST, LEVL II, 10-19 MIN: ICD-10-PCS | Mod: SA,HB,S$PBB, | Performed by: NURSE PRACTITIONER

## 2023-07-13 PROCEDURE — 3075F SYST BP GE 130 - 139MM HG: CPT | Mod: CPTII,,, | Performed by: NURSE PRACTITIONER

## 2023-07-13 PROCEDURE — 99213 OFFICE O/P EST LOW 20 MIN: CPT | Mod: PBBFAC,PN | Performed by: NURSE PRACTITIONER

## 2023-07-13 PROCEDURE — 3008F PR BODY MASS INDEX (BMI) DOCUMENTED: ICD-10-PCS | Mod: CPTII,,, | Performed by: NURSE PRACTITIONER

## 2023-07-13 PROCEDURE — 1160F RVW MEDS BY RX/DR IN RCRD: CPT | Mod: CPTII,,, | Performed by: NURSE PRACTITIONER

## 2023-07-13 PROCEDURE — 3080F DIAST BP >= 90 MM HG: CPT | Mod: CPTII,,, | Performed by: NURSE PRACTITIONER

## 2023-07-13 RX ORDER — SERTRALINE HYDROCHLORIDE 100 MG/1
100 TABLET, FILM COATED ORAL DAILY
Qty: 90 TABLET | Refills: 1 | Status: SHIPPED | OUTPATIENT
Start: 2023-07-13 | End: 2023-10-02 | Stop reason: SDUPTHER

## 2023-07-13 RX ORDER — CLONIDINE HYDROCHLORIDE 0.1 MG/1
0.1 TABLET ORAL NIGHTLY
Qty: 90 TABLET | Refills: 1 | Status: SHIPPED | OUTPATIENT
Start: 2023-07-13 | End: 2023-10-02 | Stop reason: SDUPTHER

## 2023-07-13 NOTE — PROGRESS NOTES
Follow-up #2  07/13/2023  HPI: Deepak Pratt is a 48 y.o. male here today for a psychiatric evaluation referred by PCP to the [facility] for depression and anxiety  Past Medical History: Squamous cell carcinoma of lung, Tobacco user Anemia of chronic disease, Cachexia, Hypercalcemia, Iron deficiency anemia, Pleural effusion on right, Pure hypercholesterolemia, SIADH (syndrome of inappropriate ADH production), ADHD, predominantly inattentive type, Depression with anxiety    On his last visit, the Zoloft was increased to 100mg at HS.     Today, patient states that he is doing well.   He states that the increase in the Zoloft was helpful. Sates that he still has a little anxiety that is work related but that he feels more normal now than he has in a while.   He is also still taking the Clonidine 0.1mg at HS for anxiety and inattention.     He is sleeping well at night.   He is not having any issues with his blood pressure.     He states that the anxiety that he experiences is work related; that is expected and it passes once he gets started on the job.    No medication changes needed.  FU in 4 months    PHQ Score:   07/13/2023: 1 minimal  05/15/2023: 1  03/24/2023: 5    DELIO-7 Score:   07/13/2023: 0 normal  05/15/2023: 3   03/24/2023: 10    Mental Status Evaluation:  Appearance:  age appropriate, casually dressed, neatly groomed   Behavior:  normal, cooperative   Speech:  no latency; no press   Mood:  euthymic   Affect:  mood-congruent   Thought Process:  normal and logical   Thought Content:  normal, no suicidality, no homicidality, delusions, or paranoia   Sensorium:  grossly intact   Cognition:  grossly intact   Insight:  intact   Judgment:  behavior is adequate to circumstances     Impression:  Anxiety/Separation Anxiety  2. MDD  3. ADHD - inattentive    Plan:  Continue Clonidine 0.1mg at bedtime for both anxiety and inattention  2. Continue Zoloft to 100mg at HS for depression and anxiety  3. Continue 1:1  therapy   4. Follow-up in 4 months    Follow-up #1  05/15/2023  HPI: Deepak Pratt is a 48 y.o. male here today for a psychiatric evaluation referred by PCP to the [facility] for depression and anxiety  Past Medical History: Squamous cell carcinoma of lung, Tobacco user Anemia of chronic disease, Cachexia, Hypercalcemia, Iron deficiency anemia, Pleural effusion on right, Pure hypercholesterolemia, SIADH (syndrome of inappropriate ADH production), ADHD, predominantly inattentive type, Depression with anxiety    On his initial visit, patient was started on Clonidine 0.1mg at bedtime for both anxiety and inattention and Zoloft 25mg at bedtime x 2 weeks and then increase to 50mg at bedtime.    Today, patient states that he is doing much better. He still has some anxiety but not as much.     States that he is having an easier time going back to work. And while at work, he is not concentrating on how quickly he can finish and get back home.   He is getting out more. He and his family went to [place] for his [employer] graduation. States that prior to starting the Zoloft and Clonidine, he wanted to go to [place] and come right back home. But, he found that he was able to enjoy the trip and they made a vacation out of the trip.   He is able to talk about his health without becoming tearful. He is feeling more positive.     Will increase the Zoloft to 100mg at HS and continue the Clonidine 0.1mg q HS.     FU in 6 weeks    PHQ Score:   05/15/2023: 1  03/24/2023: 5    DELIO-7 Score:   05/15/2023: 3   03/24/2023: 10    Mental Status Evaluation:  Appearance:  age appropriate, casually dressed, neatly groomed   Behavior:  normal, cooperative   Speech:  no latency; no press   Mood:  euthymic   Affect:  mood-congruent   Thought Process:  normal and logical   Thought Content:  normal, no suicidality, no homicidality, delusions, or paranoia   Sensorium:  grossly intact   Cognition:  grossly intact   Insight:  intact   Judgment:  behavior is  adequate to circumstances     Impression:  Anxiety/Separation Anxiety  2. MDD  3. ADHD - inattentive    Plan:  Clonidine 0.1mg at bedtime for both anxiety and inattention  2. Increase Zoloft to 100mg at HS for depression and anxiety  3. Continue 1:1 therapy   4. Follow-up in 6 weeks    Initial Interview  2023  HPI: Deepak Pratt is a 48 y.o. 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 He Once he gets to work, he is fine but finds it difficult to leave to go to work. He feels that he is lacking confidence.  He cannot identify triggers except that anxiety begins the day before (around 1200) he is to go to a job. He starts worrying about going to work the next day and becomes sweaty.   He states that he is the  and is worried about the job getting done correctly and on time.   Onset 2022 when he broke his ankle and could not go to work that day.      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   He was on Adderall at the time because he was inattentive and unorganized.    He does not want to get back on Adderall. He felt that it was working great but his wife states that his attitude was bad. He feels that he may have been addicted. The doses were getting higher and higher.     He explains that when he was originally diagnosed with cancer, he was given 2 months to two years to live. He lost 40lbs and was planning his . States that he did  very well with chemo and radiation and now should live into old age. States that he should be happy but that he still gets very emotional.   When he was told about his diagnoses, his was never given any therapy OR he thinks that he may have refused it. His wife told him that he needed therapy but he refused.    He states that he had started to accept the fact that he might die.   Mainly he was worried about his family having to live through his death.   He believes in a higher power.   He believes that there might be something after death but not sure what.     He wanted to be cremated.   He did not know whether or not death was going to be painful. He did not think about it too much.   He is not concerned about getting hurt at work.     May have some separation anxiety??    He does not have the energy that he used to prior to his cancer diagnosis. He says that he has a lot of people around to help him but he feels guilty for not having the energy or the will/desire.  He feels a little less than the man that he was.     He had a good appetite and sleeps well.     Will try Clonidine 0.1mg at HS to address both anxiety and inattention.   Will also start Zoloft 25mg every night to address anxiety and depression.     Medications:   Current Outpatient Medications   Medication Instructions    diclofenac sodium (VOLTAREN) 2 g, Topical (Top), 4 times daily PRN, Do not exceed 32 grams/day: do not to exceed 8 grams/day/single joint of upper extremities; do not to exceed 16 grams/day/single joint of lower extremities.  Please request refill of this medication from your PCP.    EPINEPHrine (EPIPEN 2-SUSANA) 0.3 mg, Intramuscular, Once    predniSONE (DELTASONE) 10 mg, Oral, Daily PRN        Psychiatric History:   Reports a prior psychiatric history: depression and anxiety  History of mental health out-patient treatment:   History of in-patient psychiatric hospitalization: denies  History of suicidal ideations: denies  History of  suicidal threats:   History of suicide attempts: denies  History of self mutilation:     History of psychotropic medications:   Buspar 7.5mg - gave him a sharp/strong buzz for about 30minutes and then nothing  Adderall - for ADD    Family Psychiatric History:  Mental Illness: mother Bipolar Disorder  Alcohol abuse/addiction:   Drug addiction:     Substance Use History:  Alcohol: Began drinking at 15 or 15 y/o. Hx 6-8 beers daily for 10 years.  Stopped drinking about 3 months prior to cancer diagnosis  Marijuana: denies  Benzodiazepines: denies  Opiates: denies  Stimulants: Adderall x15  years in the past - 90mg a day in the end. Stopped takikng the Adderall a month prior to being diagnosed with cancer  Cocaine: denies  Methamphetamine: denies  Nicotine: Stopped smoking cigarettes August 2021--had been using 1.5 packs/daily.  Began smoking at 16 y/o  Caffeine:    Social History:   Grew up in: [place]  Raised by: parents  Number of siblings: one sister  Education: HS grad  Employment: PT construction  Marital Status:  x 29 years  Children: 30yo son and a 26yo son  Living situation: lives in a house with his wife  Faith affiliation:     Trauma History:  History of Emotional/Mental abuse: denies  History of Physical abuse: denies  History of Sexual abuse: denies  History of other trauma: being given a CA diagnosis and  told that he had 2months to 2 years to live    Legal History:  Legal history: denies  Denies being on probation or parole  Denies any upcoming court dates  Denies any pending charges.    PHQ Score:   03/24/2023: 5    DELIO-7 Score:   03/24/2023: 10    Mental Status Evaluation:  Appearance:  age appropriate, casually dressed, neatly groomed   Behavior:  normal, cooperative   Speech:  no latency; no press   Mood:  anxious, dysthymic   Affect:  mood-congruent   Thought Process:  normal and logical   Thought Content:  normal, no suicidality, no homicidality, delusions, or paranoia   Sensorium:   grossly intact   Cognition:  grossly intact   Insight:  intact   Judgment:  behavior is adequate to circumstances     Impression:  Anxiety/Separation Anxiety  2. MDD  3. ADHD - inattentive    Plan:  Clonidine 0.1mg at bedtime for both anxiety and inattention  2. Zoloft 25mg at bedtime x 2 weeks and then increase to 50mg at bedtime for depression and anxiety  3. Continue 1:1 therapy   4. Follow-up in 6 weeks

## 2023-08-14 PROBLEM — Z85.118 ENCOUNTER FOR FOLLOW-UP SURVEILLANCE OF LUNG CANCER: Status: RESOLVED | Noted: 2023-05-13 | Resolved: 2023-08-14

## 2023-08-14 PROBLEM — Z08 ENCOUNTER FOR FOLLOW-UP SURVEILLANCE OF LUNG CANCER: Status: RESOLVED | Noted: 2023-05-13 | Resolved: 2023-08-14

## 2023-08-16 ENCOUNTER — HOSPITAL ENCOUNTER (OUTPATIENT)
Dept: RADIOLOGY | Facility: HOSPITAL | Age: 48
Discharge: HOME | End: 2023-08-16
Attending: INTERNAL MEDICINE
Payer: MEDICAID

## 2023-08-16 DIAGNOSIS — C34.90 SQUAMOUS CELL CARCINOMA OF LUNG, UNSPECIFIED LATERALITY: ICD-10-CM

## 2023-08-16 DIAGNOSIS — J90 PLEURAL EFFUSION ON RIGHT: ICD-10-CM

## 2023-08-16 PROCEDURE — 71250 CT THORAX DX C-: CPT | Mod: TC

## 2023-08-22 PROBLEM — C34.91 ADENOCARCINOMA OF RIGHT LUNG, STAGE 3: Status: ACTIVE | Noted: 2023-08-22

## 2023-08-22 PROBLEM — Z08 ENCOUNTER FOR FOLLOW-UP SURVEILLANCE OF LUNG CANCER: Status: ACTIVE | Noted: 2023-08-22

## 2023-08-22 PROBLEM — Z85.118 ENCOUNTER FOR FOLLOW-UP SURVEILLANCE OF LUNG CANCER: Status: ACTIVE | Noted: 2023-08-22

## 2023-08-23 ENCOUNTER — INFUSION (OUTPATIENT)
Dept: INFUSION THERAPY | Facility: HOSPITAL | Age: 48
End: 2023-08-23
Attending: INTERNAL MEDICINE
Payer: MEDICAID

## 2023-08-23 ENCOUNTER — OFFICE VISIT (OUTPATIENT)
Dept: HEMATOLOGY/ONCOLOGY | Facility: CLINIC | Age: 48
End: 2023-08-23
Attending: INTERNAL MEDICINE
Payer: MEDICAID

## 2023-08-23 VITALS
HEART RATE: 86 BPM | WEIGHT: 128.63 LBS | HEIGHT: 64 IN | RESPIRATION RATE: 20 BRPM | DIASTOLIC BLOOD PRESSURE: 83 MMHG | BODY MASS INDEX: 21.96 KG/M2 | TEMPERATURE: 99 F | SYSTOLIC BLOOD PRESSURE: 123 MMHG | OXYGEN SATURATION: 99 %

## 2023-08-23 DIAGNOSIS — Z85.118 ENCOUNTER FOR FOLLOW-UP SURVEILLANCE OF LUNG CANCER: ICD-10-CM

## 2023-08-23 DIAGNOSIS — J90 EXUDATIVE PLEURAL EFFUSION: Chronic | ICD-10-CM

## 2023-08-23 DIAGNOSIS — J90 PLEURAL EFFUSION ON RIGHT: ICD-10-CM

## 2023-08-23 DIAGNOSIS — D50.8 OTHER IRON DEFICIENCY ANEMIA: ICD-10-CM

## 2023-08-23 DIAGNOSIS — Z08 ENCOUNTER FOR FOLLOW-UP SURVEILLANCE OF LUNG CANCER: ICD-10-CM

## 2023-08-23 DIAGNOSIS — C34.91 ADENOCARCINOMA OF RIGHT LUNG, STAGE 3: ICD-10-CM

## 2023-08-23 DIAGNOSIS — C34.90 SQUAMOUS CELL CARCINOMA OF LUNG, UNSPECIFIED LATERALITY: Primary | ICD-10-CM

## 2023-08-23 DIAGNOSIS — F41.8 DEPRESSION WITH ANXIETY: Chronic | ICD-10-CM

## 2023-08-23 DIAGNOSIS — D50.8 OTHER IRON DEFICIENCY ANEMIA: Chronic | ICD-10-CM

## 2023-08-23 DIAGNOSIS — J90 RECURRENT RIGHT PLEURAL EFFUSION: ICD-10-CM

## 2023-08-23 DIAGNOSIS — E83.52 HYPERCALCEMIA: Chronic | ICD-10-CM

## 2023-08-23 DIAGNOSIS — C34.90 SQUAMOUS CELL CARCINOMA OF LUNG, UNSPECIFIED LATERALITY: Primary | Chronic | ICD-10-CM

## 2023-08-23 DIAGNOSIS — J90 EXUDATIVE PLEURAL EFFUSION: ICD-10-CM

## 2023-08-23 DIAGNOSIS — J90 PLEURAL EFFUSION ON RIGHT: Chronic | ICD-10-CM

## 2023-08-23 DIAGNOSIS — E22.2 SIADH (SYNDROME OF INAPPROPRIATE ADH PRODUCTION): Chronic | ICD-10-CM

## 2023-08-23 PROCEDURE — 63600175 PHARM REV CODE 636 W HCPCS: Performed by: INTERNAL MEDICINE

## 2023-08-23 PROCEDURE — 25000003 PHARM REV CODE 250: Performed by: INTERNAL MEDICINE

## 2023-08-23 PROCEDURE — 99214 PR OFFICE/OUTPT VISIT, EST, LEVL IV, 30-39 MIN: ICD-10-PCS | Mod: S$PBB,,, | Performed by: INTERNAL MEDICINE

## 2023-08-23 PROCEDURE — 1159F PR MEDICATION LIST DOCUMENTED IN MEDICAL RECORD: ICD-10-PCS | Mod: CPTII,,, | Performed by: INTERNAL MEDICINE

## 2023-08-23 PROCEDURE — 3074F PR MOST RECENT SYSTOLIC BLOOD PRESSURE < 130 MM HG: ICD-10-PCS | Mod: CPTII,,, | Performed by: INTERNAL MEDICINE

## 2023-08-23 PROCEDURE — 3074F SYST BP LT 130 MM HG: CPT | Mod: CPTII,,, | Performed by: INTERNAL MEDICINE

## 2023-08-23 PROCEDURE — A4216 STERILE WATER/SALINE, 10 ML: HCPCS | Performed by: INTERNAL MEDICINE

## 2023-08-23 PROCEDURE — 3008F BODY MASS INDEX DOCD: CPT | Mod: CPTII,,, | Performed by: INTERNAL MEDICINE

## 2023-08-23 PROCEDURE — 1160F RVW MEDS BY RX/DR IN RCRD: CPT | Mod: CPTII,,, | Performed by: INTERNAL MEDICINE

## 2023-08-23 PROCEDURE — 1159F MED LIST DOCD IN RCRD: CPT | Mod: CPTII,,, | Performed by: INTERNAL MEDICINE

## 2023-08-23 PROCEDURE — 99214 OFFICE O/P EST MOD 30 MIN: CPT | Mod: S$PBB,,, | Performed by: INTERNAL MEDICINE

## 2023-08-23 PROCEDURE — 3079F DIAST BP 80-89 MM HG: CPT | Mod: CPTII,,, | Performed by: INTERNAL MEDICINE

## 2023-08-23 PROCEDURE — 96523 IRRIG DRUG DELIVERY DEVICE: CPT

## 2023-08-23 PROCEDURE — 99214 OFFICE O/P EST MOD 30 MIN: CPT | Mod: PBBFAC | Performed by: INTERNAL MEDICINE

## 2023-08-23 PROCEDURE — 3079F PR MOST RECENT DIASTOLIC BLOOD PRESSURE 80-89 MM HG: ICD-10-PCS | Mod: CPTII,,, | Performed by: INTERNAL MEDICINE

## 2023-08-23 PROCEDURE — 3008F PR BODY MASS INDEX (BMI) DOCUMENTED: ICD-10-PCS | Mod: CPTII,,, | Performed by: INTERNAL MEDICINE

## 2023-08-23 PROCEDURE — 1160F PR REVIEW ALL MEDS BY PRESCRIBER/CLIN PHARMACIST DOCUMENTED: ICD-10-PCS | Mod: CPTII,,, | Performed by: INTERNAL MEDICINE

## 2023-08-23 RX ORDER — HEPARIN 100 UNIT/ML
500 SYRINGE INTRAVENOUS
Status: CANCELLED | OUTPATIENT
Start: 2023-08-23

## 2023-08-23 RX ORDER — HEPARIN 100 UNIT/ML
500 SYRINGE INTRAVENOUS
Status: DISCONTINUED | OUTPATIENT
Start: 2023-08-23 | End: 2023-08-23 | Stop reason: HOSPADM

## 2023-08-23 RX ORDER — SODIUM CHLORIDE 0.9 % (FLUSH) 0.9 %
10 SYRINGE (ML) INJECTION
Status: CANCELLED | OUTPATIENT
Start: 2023-08-23

## 2023-08-23 RX ORDER — SODIUM CHLORIDE 0.9 % (FLUSH) 0.9 %
10 SYRINGE (ML) INJECTION
Status: DISCONTINUED | OUTPATIENT
Start: 2023-08-23 | End: 2023-08-23 | Stop reason: HOSPADM

## 2023-08-23 RX ADMIN — SODIUM CHLORIDE, PRESERVATIVE FREE 10 ML: 5 INJECTION INTRAVENOUS at 01:08

## 2023-08-23 RX ADMIN — HEPARIN 500 UNITS: 100 SYRINGE at 01:08

## 2023-08-23 NOTE — Clinical Note
Refer to IR for diagnostic right-sided thoracentesis Please make sure the pleural fluid is sent for the following:  Cytology, cell count, differential, culture, g stain, pH, glucose, protein, albumin, LDH, amylase Follow-up with me in 3 weeks.

## 2023-08-23 NOTE — PROGRESS NOTES
History:  Past Medical History:   Diagnosis Date    Anemia of chronic disease 5/15/2022    Attention deficit hyperactivity disorder (ADHD), predominantly inattentive type 7/15/2022    Cachexia 7/15/2022    Depression with anxiety 7/15/2022    Hypercalcemia 5/15/2022    Iron deficiency anemia 5/15/2022    Lung cancer     Pleural effusion on right 2022    Pure hypercholesterolemia 10/27/2022    SIADH (syndrome of inappropriate ADH production) 5/15/2022    Squamous cell carcinoma of lung 3/21/2022    Tobacco user 7/15/2022   Past medical history: Attention deficit disorder. Tobacco abuse. Tonsillectomy.  Social history: .  Lives in [place], [place].  Has 2 children.  Has worked as a  all his life (more than 30-35 years).  Smoked 1-1/2 packs of cigarettes daily for 15 years; then, 1 pack daily for 1 year; now, for last 2 months, 2 cigarettes daily.  Heavy alcohol use in the past; 8-10 beers daily for 10 years; quit 1-1/2 years ago.  No illicit drugs.  Family history: No family members with cancer.  Health maintenance: So far, does not have a primary care physician.  Apparently, he is going to see a primary care physician for the first time next week.  Past Surgical History:   Procedure Laterality Date    Bronchoscopy w endobronchial biopsy  2021    Endobronchial ablation of left main stem occlusion  2021    MEDIPORT INSERTION, SINGLE  10/11/2021    TONSILLECTOMY  1981    US guided right thoracentesis Right 2022      Social History     Socioeconomic History    Marital status:      Spouse name: Veronica Pratt    Number of children: 2   Occupational History    Occupation:    Tobacco Use    Smoking status: Former     Current packs/day: 0.00     Average packs/day: 1.5 packs/day for 31.0 years (46.5 ttl pk-yrs)     Types: Cigarettes     Start date: 1990     Quit date: 2021     Years since quittin.6    Smokeless tobacco: Never   Substance and  Sexual Activity    Alcohol use: Yes    Drug use: Never    Sexual activity: Yes      Family History   Problem Relation Age of Onset    ALS Mother 40    Nephrolithiasis Father     Cholecystitis Father     Psychosis Sister     Diabetes type II Son       Reason for Follow-up:   -Squamous cell carcinoma of lung, stage III  -right-sided exudative pleural effusion (S/P thoracentesis 02/15/2023)   -Microcytic anemia (relative iron deficiency)   -Hypercalcemia of malignancy   -Thrombocytosis   -Hyponatremia (SIADH)       History of Present Illness:   Lung Cancer (Squamous cell carcinoma of lung, unspecified laterality)        Oncologic/Hematologic History:  Oncology History   Squamous cell carcinoma of lung   1/25/2022 - 12/6/2022 Chemotherapy    Treatment Summary   Plan Name: OP DURVALUMAB 1500 MG Q4W  Treatment Goal: Control  Status: Inactive  Start Date: 1/25/2022  End Date: 12/6/2022  Provider: Brendan Amaral MD  Chemotherapy: [No matching medication found in this treatment plan]     3/21/2022 Initial Diagnosis    Squamous cell carcinoma of lung     5/15/2022 Cancer Staged    Staging form: Lung, AJCC 8th Edition  - Clinical stage from 5/15/2022: Stage IIIB (cT4, cN2, cM0)     Patient is being followed for history of squamous cell carcinoma of lung, stage IIIB or stage IIIC, treated with chemoradiation therapy, followed by consolidation durvalumab.      Please see assessment and plan section for details.     09/20/2021:   Presents for initial medical oncology consultation      Interval History:  PORT FLUSH   [No matching plan found]   08/23/2023:   -08/16/2023:  Surveillance chest CT without contrast (comparison:  05/09/2023):  Stable appearance of the chest since 05/09/2023  -follows up with behavioral health for depression and anxiety  Labs reviewed.    Presents for a follow-up visit.  Overall, stable except for some exertional dyspnea.  Has some fluid in the right pleural cavity on recent noncontrast chest CT.  No  fevers or chills.  Stopped smoking 2 years ago.  Good appetite.  Good energy.  Some hot flashes and fatigue.  Some exertional dyspnea, cough, and wheezing.  Some heartburn.  Some numbness and tingling in feet.  No unusual headaches or focal neurological symptoms.  No vision impairment.      Medications:  Current Outpatient Medications on File Prior to Visit   Medication Sig Dispense Refill    cloNIDine (CATAPRES) 0.1 MG tablet Take 1 tablet (0.1 mg total) by mouth nightly. 90 tablet 1    gabapentin (NEURONTIN) 300 MG capsule Take 1 capsule (300 mg total) by mouth 3 (three) times daily. 90 capsule 11    sertraline (ZOLOFT) 100 MG tablet Take 1 tablet (100 mg total) by mouth once daily. 90 tablet 1    EPINEPHrine (EPIPEN 2-SUSANA) 0.3 mg/0.3 mL AtIn Inject 0.3 mLs (0.3 mg total) into the muscle once. for 1 dose 0.3 mL 3    fluticasone propionate (FLONASE) 50 mcg/actuation nasal spray 2 sprays (100 mcg total) by Each Nostril route once daily. (Patient not taking: Reported on 7/13/2023) 16 g 2    loratadine (CLARITIN) 10 mg tablet Take 1 tablet (10 mg total) by mouth once daily. (Patient not taking: Reported on 7/13/2023) 30 tablet 0    predniSONE (DELTASONE) 10 MG tablet Take 1 tablet (10 mg total) by mouth daily as needed (wasp sting). (Patient not taking: Reported on 8/23/2023) 10 tablet 4     No current facility-administered medications on file prior to visit.       Review of Systems:   All systems reviewed and found to be negative except for the symptoms detailed above    Physical Examination:   VITAL SIGNS:   Vitals:    08/23/23 1401   BP: 123/83   Pulse: 86   Resp: 20   Temp: 98.7 °F (37.1 °C)       GENERAL:  In no apparent distress.    HEAD:  No signs of head trauma.  EYES:  Pupils are equal.  Extraocular motions intact.    EARS:  Hearing grossly intact.  MOUTH:  Oropharynx is normal.   NECK:  No adenopathy, no JVD.     CHEST:  Chest with clear breath sounds bilaterally.  No wheezes, rales, rhonchi.    CARDIAC:   "Regular rate and rhythm.  S1 and S2, without murmurs, gallops, rubs.  VASCULAR:  No Edema.  Peripheral pulses normal and equal in all extremities.  ABDOMEN:  Soft, without detectable tenderness.  No sign of distention.  No   rebound or guarding, and no masses palpated.   Bowel Sounds normal.  MUSCULOSKELETAL:  Good range of motion of all major joints. Extremities without clubbing, cyanosis or edema.    NEUROLOGIC EXAM:  Alert and oriented x 3.  No focal sensory or strength deficits.   Speech normal.  Follows commands.  PSYCHIATRIC:  Mood normal.    No results for input(s): "CBC" in the last 72 hours.   No results for input(s): "CMP" in the last 72 hours.     Assessment:  Problem List Items Addressed This Visit          Psychiatric    Depression with anxiety (Chronic)       Pulmonary    Pleural effusion on right (Chronic)    Exudative pleural effusion (Chronic)       Renal/    Hypercalcemia (Chronic)       Oncology    Squamous cell carcinoma of lung - Primary (Chronic)    Iron deficiency anemia (Chronic)    Adenocarcinoma of right lung, stage 3    Encounter for follow-up surveillance of lung cancer       Endocrine    SIADH (syndrome of inappropriate ADH production) (Chronic)     Presented with locally advanced, obviously inoperable squamous cell carcinoma of lung;  large, 10 cm mediastinal mass, possibly arising in the right lower lung lobe, causing compressive effects on right mainstem bronchus and right pulmonary artery;  no metastases;  s/p bronchoscopic biopsy of carinal mass 08/18/2021;  large fungating mass at maximiliano, obliterating the right mainstem bronchus and partially occluding left mainstem;  causing hyponatremia, hypercalcemia;  worsening dyspnea, fever, postobstructive pneumonia     Squamous cell carcinoma of lung:  -bronchoscopy 08/18/2021  -10 cm subcarinal mass with compressive effects, right lower lung lobe large masslike consolidation with central cavitation, large fungating mass at maximiliano, " obliterating the right mainstem bronchus and partially occluding left mainstem  -cT4 cN2 M0, stage IIIB  -postobstructive pneumonia and hospitalization  -S/P laser [facility] debulking [facility] 08/27/2021  -hyponatremia secondary to SIADH  -hypercalcemia  -S/P palliative radiotherapy 09/2021, pending definitive concurrent chemoradiation therapy, with subjective and objective improvement  -Followed by sequential chemotherapy with cisplatin/docetaxel q3 weeks x4 cycles (10/2021-12/2021)  >>with considerable improvement  -followed by consolidation durvalumab every 4 weeks X 12 cycles (01/25/2022-12/06/2022)   -no recurrence or metastases on surveillance CT chest without contrast 02/06/2023  -slightly larger right pleural effusion on surveillance chest CT without contrast 02/06/2023  -02/15/2023:  Ultrasound-guided right thoracentesis:  700 cc fluid removed  (exudative pleural effusion) (it appears that cytology was never sent for despite orders)  -no recurrence or metastases on surveillance chest CT with contrast 05/09/2023  -CHRIS on surveillance noncontrast chest CT 08/16/2023  -noncontrast chest CT 08/16/2023, compared with prior CT 05/09/2023, also showed similar small-to-moderate volume right pleural fluid      Molecular markers:  -EGFR mutation negative; PD-L1 negative (TPS 0%); KRAS mutation negative;  -ALK gene rearrangement negative; ROS1 gene rearrangement negative; NTRK NGS fusion panel negative  -BRAF mutation negative; RET gene rearrangement not detected         Microcytic anemia (relative iron deficiency +/- anemia of chronic disease/malignancy):   -Hemoglobin 6.8-9.3   -MCV 79.7   -MCH, MCHC low   -Stool for occult blood negative x2 (09/2021)   -B12 normal, 755 (09/06/2021)   -Serum iron 9, TIBC 157, transferrin saturation 6%, ferritin 795.5 (09/06/2021)   -Folate low, 6.5 (09/06/2021   -09/30/2021: Serum iron 24, TIBC 220, transferrin saturation 11%, ferritin 714.65 (anemia of chronic disease/relative iron  deficiency); B12 level 1046; RBC folate 1147; hemoglobin 10.5         Hypercalcemia of malignancy:   Calcium level:   -08/06/2021: 10.3 (albumin 2.5)   -9.6-10.2 between 08/07/2021-09/08/2021   -09/09/2021: 10.5 (albumin 1.8)   -09/12/2021: 10.6 (albumin 2.2)   -09/21/2021: 8.2 (albumin 2.1)   -08/07/2021: Intact PTH level normal, 12.7; intact PTH level <2.0   -09/10/2021: PTH needed peptide <2.0   -Zometa 4 mg IV x1 (09/12/2021) >> hypercalcemia resolved    -09/30/2021: Ionized calcium level 5.1, normal (Serum calcium 9.3, albumin 2.6, corrected calcium 10.42)         Thrombocytosis:   (Subsequently, spontaneously resolved)   -Platelet count 413-588K   -Almost certainly, secondary to underlying malignancy and relative iron deficiency   -09/30/2021: ELDON-2 mutation negative   -10/08/2021: BCR-ABL1 1 fusion transcripts negative         Hyponatremia secondary to SIADH:  -Sodium 128-130  -Treated with fluid restriction, salt tablets (by nephrology)        Plan:  Repeat noncontrast chest CT for surveillance in November, then follow-up with CBC and CMP  Refer to IR for diagnostic right-sided thoracentesis  Please make sure the pleural fluid is sent for the following:  Cytology, cell count, differential, culture, g stain, pH, glucose, protein, albumin, LDH, amylase  Follow-up with me in 3 weeks.  -----------------------------      Squamous cell carcinoma of right lung, cT4 cN2 M0, stage IIIB  Palliative radiotherapy 09/2021  Followed by sequential chemotherapy with cisplatin/docetaxel X 4 cycles (10/2021-12/2021)   Followed by consolidation durvalumab 01/2022-12/2022  >>>  Continue surveillance for stage III non-small cell lung cancer, treated with chemoradiation therapy  -Completed radiotherapy, followed by sequential chemotherapy 12/2021  -History and physical and chest CT +/- contrast every 3 months for 3 years (12/2021-12/2024), then every 6 months for 2 years (12/2024-12/2026), then history and physical and low-dose  noncontrast enhanced chest CT annually  >>>  -CHRIS on surveillance noncontrast chest CT 02/06/2023  -exudative right-sided pleural effusion, 700 cc (02/2023)  -CHRIS on surveillance CT chest with contrast 05/09/2023  -no recurrence or metastases on surveillance chest CT with contrast 05/09/2023  -no recurrence or metastases on surveillance chest CT with contrast 05/09/2023  -CHRIS on surveillance noncontrast chest CT 08/16/2023   >>>  Repeat noncontrast chest CT for surveillance in 3 months (November)    -noncontrast chest CT 08/16/2023, compared with prior CT 05/09/2023, also showed similar small-to-moderate volume right pleural fluid  >>>  Refer to IR for diagnostic right-sided thoracentesis  Please make sure the pleural fluid is sent for the following:  Cytology, cell count, differential, culture, g stain, pH, glucose, protein, albumin, LDH, amylase  Follow-up with me in 3 weeks.     -Microcytic anemia  -Anemia of chronic disease/relative iron deficiency  -not an issue at this time    -03/03/2023: Anxiety and mood swings; no suicidal or homicidal ideation; no psychotic symptoms; follow-up with behavioral health      Hypercalcemia of malignancy   -S/p Zometa 4 mg IV x1 (09/12/2021)     Thrombocytosis; reactive; subsequently, spontaneously resolved     Hyponatremia secondary to SIADH secondary to malignancy  -Managed by renal; managed with fluid restriction  -resolved     Follow-up with me in 3 weeks, after right-sided thoracentesis and diagnostic studies on pleural fluid.     Above discussed with him.  All questions answered.  Discussed labs and scans and gave him copies of relevant report.  He understands and agrees with this plan.      Follow-up:  No follow-ups on file.

## 2023-08-25 ENCOUNTER — TELEPHONE (OUTPATIENT)
Dept: INTERVENTIONAL RADIOLOGY/VASCULAR | Facility: HOSPITAL | Age: 48
End: 2023-08-25

## 2023-08-28 ENCOUNTER — TELEPHONE (OUTPATIENT)
Dept: INTERVENTIONAL RADIOLOGY/VASCULAR | Facility: HOSPITAL | Age: 48
End: 2023-08-28

## 2023-08-28 ENCOUNTER — HOSPITAL ENCOUNTER (OUTPATIENT)
Dept: RADIOLOGY | Facility: HOSPITAL | Age: 48
Discharge: HOME | End: 2023-08-28
Attending: INTERNAL MEDICINE
Payer: MEDICAID

## 2023-08-28 DIAGNOSIS — Z98.890 STATUS POST THORACENTESIS: ICD-10-CM

## 2023-08-28 PROCEDURE — 71046 X-RAY EXAM CHEST 2 VIEWS: CPT | Mod: TC

## 2023-08-28 NOTE — PROGRESS NOTES
Refer to IR for thoracentesis.    Send fluid for cytology, cell count, differential, g stain, culture, glucose, protein, albumin, amylase, LDH, pH

## 2023-10-02 DIAGNOSIS — F41.9 ANXIETY: ICD-10-CM

## 2023-10-02 DIAGNOSIS — F90.9 ADULT ADHD: ICD-10-CM

## 2023-10-02 DIAGNOSIS — F41.8 DEPRESSION WITH ANXIETY: Chronic | ICD-10-CM

## 2023-10-02 RX ORDER — SERTRALINE HYDROCHLORIDE 100 MG/1
100 TABLET, FILM COATED ORAL DAILY
Qty: 90 TABLET | Refills: 1 | Status: SHIPPED | OUTPATIENT
Start: 2023-10-02 | End: 2023-11-14 | Stop reason: SDUPTHER

## 2023-10-02 RX ORDER — CLONIDINE HYDROCHLORIDE 0.1 MG/1
0.1 TABLET ORAL NIGHTLY
Qty: 90 TABLET | Refills: 1 | Status: SHIPPED | OUTPATIENT
Start: 2023-10-02 | End: 2023-11-14 | Stop reason: SDUPTHER

## 2023-10-16 PROBLEM — J90 RECURRENT RIGHT PLEURAL EFFUSION: Chronic | Status: ACTIVE | Noted: 2023-08-23

## 2023-10-16 PROBLEM — D75.839 THROMBOCYTOSIS: Chronic | Status: ACTIVE | Noted: 2023-10-16

## 2023-10-16 PROBLEM — J90 PLEURAL EFFUSION ON RIGHT: Chronic | Status: RESOLVED | Noted: 2022-07-09 | Resolved: 2023-10-16

## 2023-10-16 PROBLEM — D75.839 THROMBOCYTOSIS: Status: ACTIVE | Noted: 2023-10-16

## 2023-11-14 ENCOUNTER — OFFICE VISIT (OUTPATIENT)
Dept: BEHAVIORAL HEALTH | Facility: CLINIC | Age: 48
End: 2023-11-14
Payer: MEDICAID

## 2023-11-14 VITALS
HEIGHT: 63 IN | WEIGHT: 130.38 LBS | BODY MASS INDEX: 23.1 KG/M2 | TEMPERATURE: 98 F | SYSTOLIC BLOOD PRESSURE: 133 MMHG | DIASTOLIC BLOOD PRESSURE: 88 MMHG | HEART RATE: 80 BPM

## 2023-11-14 DIAGNOSIS — F41.8 DEPRESSION WITH ANXIETY: Primary | Chronic | ICD-10-CM

## 2023-11-14 DIAGNOSIS — F90.9 ADULT ADHD: ICD-10-CM

## 2023-11-14 DIAGNOSIS — F90.0 ATTENTION DEFICIT HYPERACTIVITY DISORDER (ADHD), PREDOMINANTLY INATTENTIVE TYPE: Chronic | ICD-10-CM

## 2023-11-14 DIAGNOSIS — F41.9 ANXIETY: ICD-10-CM

## 2023-11-14 PROCEDURE — 1160F PR REVIEW ALL MEDS BY PRESCRIBER/CLIN PHARMACIST DOCUMENTED: ICD-10-PCS | Mod: CPTII,,, | Performed by: NURSE PRACTITIONER

## 2023-11-14 PROCEDURE — 99213 OFFICE O/P EST LOW 20 MIN: CPT | Mod: PBBFAC,PN | Performed by: NURSE PRACTITIONER

## 2023-11-14 PROCEDURE — 1159F PR MEDICATION LIST DOCUMENTED IN MEDICAL RECORD: ICD-10-PCS | Mod: CPTII,,, | Performed by: NURSE PRACTITIONER

## 2023-11-14 PROCEDURE — 3079F DIAST BP 80-89 MM HG: CPT | Mod: CPTII,,, | Performed by: NURSE PRACTITIONER

## 2023-11-14 PROCEDURE — 99212 OFFICE O/P EST SF 10 MIN: CPT | Mod: SA,HB,S$PBB, | Performed by: NURSE PRACTITIONER

## 2023-11-14 PROCEDURE — 3008F BODY MASS INDEX DOCD: CPT | Mod: CPTII,,, | Performed by: NURSE PRACTITIONER

## 2023-11-14 PROCEDURE — 1160F RVW MEDS BY RX/DR IN RCRD: CPT | Mod: CPTII,,, | Performed by: NURSE PRACTITIONER

## 2023-11-14 PROCEDURE — 3008F PR BODY MASS INDEX (BMI) DOCUMENTED: ICD-10-PCS | Mod: CPTII,,, | Performed by: NURSE PRACTITIONER

## 2023-11-14 PROCEDURE — 1159F MED LIST DOCD IN RCRD: CPT | Mod: CPTII,,, | Performed by: NURSE PRACTITIONER

## 2023-11-14 PROCEDURE — 99212 PR OFFICE/OUTPT VISIT, EST, LEVL II, 10-19 MIN: ICD-10-PCS | Mod: SA,HB,S$PBB, | Performed by: NURSE PRACTITIONER

## 2023-11-14 PROCEDURE — 3075F PR MOST RECENT SYSTOLIC BLOOD PRESS GE 130-139MM HG: ICD-10-PCS | Mod: CPTII,,, | Performed by: NURSE PRACTITIONER

## 2023-11-14 PROCEDURE — 3075F SYST BP GE 130 - 139MM HG: CPT | Mod: CPTII,,, | Performed by: NURSE PRACTITIONER

## 2023-11-14 PROCEDURE — 3079F PR MOST RECENT DIASTOLIC BLOOD PRESSURE 80-89 MM HG: ICD-10-PCS | Mod: CPTII,,, | Performed by: NURSE PRACTITIONER

## 2023-11-14 RX ORDER — SERTRALINE HYDROCHLORIDE 100 MG/1
100 TABLET, FILM COATED ORAL DAILY
Qty: 90 TABLET | Refills: 1 | Status: SHIPPED | OUTPATIENT
Start: 2023-11-14

## 2023-11-14 RX ORDER — CLONIDINE HYDROCHLORIDE 0.1 MG/1
0.1 TABLET ORAL NIGHTLY
Qty: 90 TABLET | Refills: 1 | Status: SHIPPED | OUTPATIENT
Start: 2023-11-14

## 2023-11-14 NOTE — PROGRESS NOTES
Follow-up #3   11/14/2023  HPI: Deepak Pratt is a 48 y.o. male here today for a psychiatric evaluation referred by PCP to the [facility] for depression and anxiety  Past Medical History: Squamous cell carcinoma of lung, Tobacco user Anemia of chronic disease, Cachexia, Hypercalcemia, Iron deficiency anemia, Pleural effusion on right, Pure hypercholesterolemia, SIADH (syndrome of inappropriate ADH production), ADHD, predominantly inattentive type, Depression with anxiety    On his last visit, no medication changes needed.    Today, patient states that he is doing well. He is working. Most days he is off by noon.   No increase in his anxiety.   No med changes today.     FU in 6 months - virtual    PHQ Score:   11/14/2023: 0  07/13/2023: 1 minimal  05/15/2023: 1  03/24/2023: 5    DELIO-7 Score:   11/14/2023: 0  07/13/2023: 0 normal  05/15/2023: 3   03/24/2023: 10    Mental Status Evaluation:  Appearance:  age appropriate, casually dressed, neatly groomed   Behavior:  normal, cooperative   Speech:  no latency; no press   Mood:  euthymic   Affect:  mood-congruent   Thought Process:  normal and logical   Thought Content:  normal, no suicidality, no homicidality, delusions, or paranoia   Sensorium:  grossly intact   Cognition:  grossly intact   Insight:  intact   Judgment:  behavior is adequate to circumstances     Impression:  Anxiety/Separation Anxiety  2. MDD  3. ADHD - inattentive    Plan:  Continue Clonidine 0.1mg at bedtime for both anxiety and inattention  2. Continue Zoloft to 100mg at HS for depression and anxiety  3. Continue 1:1 therapy   4. Follow-up in 6 months - virtual      Follow-up #2  07/13/2023  HPI: Deepak Pratt is a 48 y.o. male here today for a psychiatric evaluation referred by PCP to the [facility] for depression and anxiety  Past Medical History: Squamous cell carcinoma of lung, Tobacco user Anemia of chronic disease, Cachexia, Hypercalcemia, Iron deficiency anemia, Pleural effusion on  right, Pure hypercholesterolemia, SIADH (syndrome of inappropriate ADH production), ADHD, predominantly inattentive type, Depression with anxiety    On his last visit, the Zoloft was increased to 100mg at HS.     Today, patient states that he is doing well.   He states that the increase in the Zoloft was helpful. Sates that he still has a little anxiety that is work related but that he feels more normal now than he has in a while.   He is also still taking the Clonidine 0.1mg at HS for anxiety and inattention.     He is sleeping well at night.   He is not having any issues with his blood pressure.     He states that the anxiety that he experiences is work related; that is expected and it passes once he gets started on the job.    No medication changes needed.  FU in 4 months    PHQ Score:   07/13/2023: 1 minimal  05/15/2023: 1  03/24/2023: 5    DELIO-7 Score:   07/13/2023: 0 normal  05/15/2023: 3   03/24/2023: 10    Mental Status Evaluation:  Appearance:  age appropriate, casually dressed, neatly groomed   Behavior:  normal, cooperative   Speech:  no latency; no press   Mood:  euthymic   Affect:  mood-congruent   Thought Process:  normal and logical   Thought Content:  normal, no suicidality, no homicidality, delusions, or paranoia   Sensorium:  grossly intact   Cognition:  grossly intact   Insight:  intact   Judgment:  behavior is adequate to circumstances     Impression:  Anxiety/Separation Anxiety  2. MDD  3. ADHD - inattentive    Plan:  Continue Clonidine 0.1mg at bedtime for both anxiety and inattention  2. Continue Zoloft to 100mg at HS for depression and anxiety  3. Continue 1:1 therapy   4. Follow-up in 4 months    Follow-up #1  05/15/2023  HPI: Deepak Pratt is a 48 y.o. male here today for a psychiatric evaluation referred by PCP to the [facility] for depression and anxiety  Past Medical History: Squamous cell carcinoma of lung, Tobacco user Anemia of chronic disease, Cachexia, Hypercalcemia, Iron  deficiency anemia, Pleural effusion on right, Pure hypercholesterolemia, SIADH (syndrome of inappropriate ADH production), ADHD, predominantly inattentive type, Depression with anxiety    On his initial visit, patient was started on Clonidine 0.1mg at bedtime for both anxiety and inattention and Zoloft 25mg at bedtime x 2 weeks and then increase to 50mg at bedtime.    Today, patient states that he is doing much better. He still has some anxiety but not as much.     States that he is having an easier time going back to work. And while at work, he is not concentrating on how quickly he can finish and get back home.   He is getting out more. He and his family went to [place] for his [employer] graduation. States that prior to starting the Zoloft and Clonidine, he wanted to go to [place] and come right back home. But, he found that he was able to enjoy the trip and they made a vacation out of the trip.   He is able to talk about his health without becoming tearful. He is feeling more positive.     Will increase the Zoloft to 100mg at HS and continue the Clonidine 0.1mg q HS.     FU in 6 weeks    PHQ Score:   05/15/2023: 1  03/24/2023: 5    DELIO-7 Score:   05/15/2023: 3   03/24/2023: 10    Mental Status Evaluation:  Appearance:  age appropriate, casually dressed, neatly groomed   Behavior:  normal, cooperative   Speech:  no latency; no press   Mood:  euthymic   Affect:  mood-congruent   Thought Process:  normal and logical   Thought Content:  normal, no suicidality, no homicidality, delusions, or paranoia   Sensorium:  grossly intact   Cognition:  grossly intact   Insight:  intact   Judgment:  behavior is adequate to circumstances     Impression:  Anxiety/Separation Anxiety  2. MDD  3. ADHD - inattentive    Plan:  Clonidine 0.1mg at bedtime for both anxiety and inattention  2. Increase Zoloft to 100mg at HS for depression and anxiety  3. Continue 1:1 therapy   4. Follow-up in 6 weeks    Initial Interview  03/24/2023  HPI: Deepak  Santa is a 48 y.o. 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 He Once he gets to work, he is fine but finds it difficult to leave to go to work. He feels that he is lacking confidence.  He cannot identify triggers except that anxiety begins the day before (around 1200) he is to go to a job. He starts worrying about going to work the next day and becomes sweaty.   He states that he is the  and is worried about the job getting done correctly and on time.   Onset 2022 when he broke his ankle and could not go to work that day.      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   He was on Adderall at the time because he was inattentive and unorganized.    He does not want to get back on Adderall. He felt that it was working great but his wife states that his attitude was bad. He feels that he may have been addicted. The doses were getting higher and higher.     He explains that when he was originally diagnosed with cancer, he was given 2 months to two years to live. He lost 40lbs and was planning his . States that he did very well with chemo and radiation and now should live into old age. States that he should be happy but that he still gets very emotional.   When he was told about his diagnoses, his was never given any therapy OR he thinks that he may have refused it. His wife told him that he needed therapy but he refused.    He states that he  had started to accept the fact that he might die.   Mainly he was worried about his family having to live through his death.   He believes in a higher power.   He believes that there might be something after death but not sure what.     He wanted to be cremated.   He did not know whether or not death was going to be painful. He did not think about it too much.   He is not concerned about getting hurt at work.     May have some separation anxiety??    He does not have the energy that he used to prior to his cancer diagnosis. He says that he has a lot of people around to help him but he feels guilty for not having the energy or the will/desire.  He feels a little less than the man that he was.     He had a good appetite and sleeps well.     Will try Clonidine 0.1mg at HS to address both anxiety and inattention.   Will also start Zoloft 25mg every night to address anxiety and depression.     Medications:   Current Outpatient Medications   Medication Instructions    diclofenac sodium (VOLTAREN) 2 g, Topical (Top), 4 times daily PRN, Do not exceed 32 grams/day: do not to exceed 8 grams/day/single joint of upper extremities; do not to exceed 16 grams/day/single joint of lower extremities.  Please request refill of this medication from your PCP.    EPINEPHrine (EPIPEN 2-SUSANA) 0.3 mg, Intramuscular, Once    predniSONE (DELTASONE) 10 mg, Oral, Daily PRN        Psychiatric History:   Reports a prior psychiatric history: depression and anxiety  History of mental health out-patient treatment:   History of in-patient psychiatric hospitalization: denies  History of suicidal ideations: denies  History of suicidal threats:   History of suicide attempts: denies  History of self mutilation:     History of psychotropic medications:   Buspar 7.5mg - gave him a sharp/strong buzz for about 30minutes and then nothing  Adderall - for ADD    Family Psychiatric History:  Mental Illness: mother Bipolar Disorder  Alcohol abuse/addiction:   Drug  addiction:     Substance Use History:  Alcohol: Began drinking at 15 or 15 y/o. Hx 6-8 beers daily for 10 years.  Stopped drinking about 3 months prior to cancer diagnosis  Marijuana: denies  Benzodiazepines: denies  Opiates: denies  Stimulants: Adderall x15  years in the past - 90mg a day in the end. Stopped takikng the Adderall a month prior to being diagnosed with cancer  Cocaine: denies  Methamphetamine: denies  Nicotine: Stopped smoking cigarettes August 2021--had been using 1.5 packs/daily.  Began smoking at 14 y/o  Caffeine:    Social History:   Grew up in: [place]  Raised by: parents  Number of siblings: one sister  Education: HS grad  Employment: PT construction  Marital Status:  x 29 years  Children: 30yo son and a 26yo son  Living situation: lives in a house with his wife  Nondenominational affiliation:     Trauma History:  History of Emotional/Mental abuse: denies  History of Physical abuse: denies  History of Sexual abuse: denies  History of other trauma: being given a CA diagnosis and  told that he had 2months to 2 years to live    Legal History:  Legal history: denies  Denies being on probation or parole  Denies any upcoming court dates  Denies any pending charges.    PHQ Score:   03/24/2023: 5    DELIO-7 Score:   03/24/2023: 10    Mental Status Evaluation:  Appearance:  age appropriate, casually dressed, neatly groomed   Behavior:  normal, cooperative   Speech:  no latency; no press   Mood:  anxious, dysthymic   Affect:  mood-congruent   Thought Process:  normal and logical   Thought Content:  normal, no suicidality, no homicidality, delusions, or paranoia   Sensorium:  grossly intact   Cognition:  grossly intact   Insight:  intact   Judgment:  behavior is adequate to circumstances     Impression:  Anxiety/Separation Anxiety  2. MDD  3. ADHD - inattentive    Plan:  Clonidine 0.1mg at bedtime for both anxiety and inattention  2. Zoloft 25mg at bedtime x 2 weeks and then increase to 50mg at bedtime for  depression and anxiety  3. Continue 1:1 therapy   4. Follow-up in 6 weeks

## 2023-11-20 ENCOUNTER — HOSPITAL ENCOUNTER (OUTPATIENT)
Dept: RADIOLOGY | Facility: HOSPITAL | Age: 48
Discharge: HOME | End: 2023-11-20
Attending: INTERNAL MEDICINE
Payer: MEDICAID

## 2023-11-20 DIAGNOSIS — J90 PLEURAL EFFUSION ON RIGHT: ICD-10-CM

## 2023-11-20 DIAGNOSIS — C34.90 SQUAMOUS CELL CARCINOMA OF LUNG, UNSPECIFIED LATERALITY: ICD-10-CM

## 2023-11-20 DIAGNOSIS — C34.91 ADENOCARCINOMA OF RIGHT LUNG, STAGE 3: ICD-10-CM

## 2023-11-20 DIAGNOSIS — J90 EXUDATIVE PLEURAL EFFUSION: ICD-10-CM

## 2023-11-20 DIAGNOSIS — D50.8 OTHER IRON DEFICIENCY ANEMIA: ICD-10-CM

## 2023-11-20 PROCEDURE — 71250 CT THORAX DX C-: CPT | Mod: TC

## 2023-11-27 PROBLEM — Z08 ENCOUNTER FOR FOLLOW-UP SURVEILLANCE OF LUNG CANCER: Status: RESOLVED | Noted: 2023-08-22 | Resolved: 2023-11-27

## 2023-11-27 PROBLEM — Z85.118 ENCOUNTER FOR FOLLOW-UP SURVEILLANCE OF LUNG CANCER: Status: RESOLVED | Noted: 2023-08-22 | Resolved: 2023-11-27

## 2023-12-09 PROBLEM — C34.91: Status: ACTIVE | Noted: 2023-12-09

## 2023-12-09 PROBLEM — C34.91 ADENOCARCINOMA OF RIGHT LUNG, STAGE 3: Status: RESOLVED | Noted: 2023-08-22 | Resolved: 2023-12-09

## 2023-12-10 NOTE — PROGRESS NOTES
History:  Past Medical History:   Diagnosis Date    Anemia of chronic disease 5/15/2022    Attention deficit hyperactivity disorder (ADHD), predominantly inattentive type 7/15/2022    Cachexia 7/15/2022    Depression with anxiety 7/15/2022    Hypercalcemia 5/15/2022    Iron deficiency anemia 5/15/2022    Lung cancer     Pleural effusion on right 2022    Pure hypercholesterolemia 10/27/2022    SIADH (syndrome of inappropriate ADH production) 5/15/2022    Squamous cell carcinoma of lung 3/21/2022    Thrombocytosis 10/16/2023    Tobacco user 7/15/2022   Past medical history: Attention deficit disorder. Tobacco abuse. Tonsillectomy.  Social history: .  Lives in [place], [place].  Has 2 children.  Has worked as a  all his life (more than 30-35 years).  Smoked 1-1/2 packs of cigarettes daily for 15 years; then, 1 pack daily for 1 year; now, for last 2 months, 2 cigarettes daily.  Heavy alcohol use in the past; 8-10 beers daily for 10 years; quit 1-1/2 years ago.  No illicit drugs.  Family history: No family members with cancer.  Health maintenance: So far, does not have a primary care physician.  Apparently, he is going to see a primary care physician for the first time next week.  Past Surgical History:   Procedure Laterality Date    Bronchoscopy w endobronchial biopsy  2021    Endobronchial ablation of left main stem occlusion  2021    MEDIPORT INSERTION, SINGLE  10/11/2021    TONSILLECTOMY  1981    US guided right thoracentesis Right 2022      Social History     Socioeconomic History    Marital status:      Spouse name: Veronica Pratt    Number of children: 2   Occupational History    Occupation:    Tobacco Use    Smoking status: Former     Current packs/day: 0.00     Average packs/day: 1.5 packs/day for 31.0 years (46.5 ttl pk-yrs)     Types: Cigarettes     Start date: 1990     Quit date: 2021     Years since quittin.9    Smokeless  tobacco: Never   Substance and Sexual Activity    Alcohol use: Yes    Drug use: Never    Sexual activity: Yes      Family History   Problem Relation Age of Onset    ALS Mother 40    Nephrolithiasis Father     Cholecystitis Father     Psychosis Sister     Diabetes type II Son       Reason for Follow-up:  -Squamous cell carcinoma of lung, stage III  -right-sided exudative pleural effusion (S/P thoracentesis 02/15/2023)  -Microcytic anemia (relative iron deficiency)  -Hypercalcemia of malignancy  -Thrombocytosis  -Hyponatremia (SIADH)       History of Present Illness:   squamous cell carcinoma of lung        Oncologic/Hematologic History:  Oncology History   Squamous cell carcinoma of lung   1/25/2022 - 12/6/2022 Chemotherapy    Treatment Summary   Plan Name: OP DURVALUMAB 1500 MG Q4W  Treatment Goal: Control  Status: Inactive  Start Date: 1/25/2022  End Date: 12/6/2022  Provider: Brendan Amaral MD  Chemotherapy: [No matching medication found in this treatment plan]     3/21/2022 Initial Diagnosis    Squamous cell carcinoma of lung     5/15/2022 Cancer Staged    Staging form: Lung, AJCC 8th Edition  - Clinical stage from 5/15/2022: Stage IIIB (cT4, cN2, cM0)     Patient is being followed for history of squamous cell carcinoma of lung, stage IIIB or stage IIIC, treated with chemoradiation therapy, followed by consolidation durvalumab.      Please see assessment and plan section for details.     09/20/2021:  Presents for initial medical oncology consultation    Interval History:  PORT FLUSH   [No matching plan found]   12/12/2023:   -08/28/2023:  Patient canceled thoracentesis with IR  -follows up with behavioral health for depression and anxiety; on Zoloft 100 mg p.o. q.h.s.  -11/20/2023: CT chest without contrast for surveillance (comparison:  CT 08/16/2023):  No recurrent malignancy  Labs reviewed; CBC and CMP within acceptable limits.    Presents for a follow-up visit.  Overall, stable.  ECOG 1.  Chronic mild  fatigue, nonprogressive.  He stopped smoking when he was diagnosed with lung cancer.  Occasional beer.  Chronic mild exertional dyspnea.  No hemoptysis.  No unusual headaches or focal neurological symptoms.  Great appetite.      Medications:  Current Outpatient Medications on File Prior to Visit   Medication Sig Dispense Refill    cloNIDine (CATAPRES) 0.1 MG tablet Take 1 tablet (0.1 mg total) by mouth nightly. 90 tablet 1    sertraline (ZOLOFT) 100 MG tablet Take 1 tablet (100 mg total) by mouth once daily. 90 tablet 1    clindamycin (CLEOCIN) 300 MG capsule Take 1 capsule by mouth 3 (three) times daily.      EPINEPHrine (EPIPEN 2-SUSANA) 0.3 mg/0.3 mL AtIn Inject 0.3 mLs (0.3 mg total) into the muscle once. for 1 dose 0.3 mL 3    predniSONE (DELTASONE) 10 MG tablet Take 1 tablet (10 mg total) by mouth daily as needed (wasp sting). (Patient not taking: Reported on 8/23/2023) 10 tablet 4     No current facility-administered medications on file prior to visit.     Review of Systems:   All systems reviewed and found to be negative except for the symptoms detailed above    Physical Examination:   VITAL SIGNS:   There were no vitals filed for this visit.    GENERAL:  In no apparent distress.    HEAD:  No signs of head trauma.  EYES:  Pupils are equal.  Extraocular motions intact.    EARS:  Hearing grossly intact.  MOUTH:  Oropharynx is normal.   NECK:  No adenopathy, no JVD.     CHEST:  Chest with clear breath sounds bilaterally.  No wheezes, rales, rhonchi.    CARDIAC:  Regular rate and rhythm.  S1 and S2, without murmurs, gallops, rubs.  VASCULAR:  No Edema.  Peripheral pulses normal and equal in all extremities.  ABDOMEN:  Soft, without detectable tenderness.  No sign of distention.  No   rebound or guarding, and no masses palpated.   Bowel Sounds normal.  MUSCULOSKELETAL:  Good range of motion of all major joints. Extremities without clubbing, cyanosis or edema.    NEUROLOGIC EXAM:  Alert and oriented x 3.  No focal  "sensory or strength deficits.   Speech normal.  Follows commands.  PSYCHIATRIC:  Mood normal.    No results for input(s): "CBC" in the last 72 hours.   No results for input(s): "CMP" in the last 72 hours.     Assessment:  Problem List Items Addressed This Visit          Pulmonary    Exudative pleural effusion - Primary (Chronic)    Recurrent right pleural effusion (Chronic)       Renal/    Hypercalcemia (Chronic)       Oncology    Iron deficiency anemia (Chronic)    Squamous cell carcinoma of lung, stage III, right       Endocrine    SIADH (syndrome of inappropriate ADH production) (Chronic)     Presented with locally advanced, obviously inoperable squamous cell carcinoma of lung;  large, 10 cm mediastinal mass, possibly arising in the right lower lung lobe, causing compressive effects on right mainstem bronchus and right pulmonary artery;  no metastases;  s/p bronchoscopic biopsy of carinal mass 08/18/2021;  large fungating mass at maximiliano, obliterating the right mainstem bronchus and partially occluding left mainstem;  causing hyponatremia, hypercalcemia;  worsening dyspnea, fever, postobstructive pneumonia     Squamous cell carcinoma of right lung:  -bronchoscopy 08/18/2021  -10 cm subcarinal mass with compressive effects, right lower lung lobe large masslike consolidation with central cavitation, large fungating mass at maximiliano, obliterating the right mainstem bronchus and partially occluding left mainstem  -cT4 cN2 M0, stage IIIB  -postobstructive pneumonia and hospitalization  -S/P laser tumor debulking at [facility] 08/27/2021  -hyponatremia secondary to SIADH  -hypercalcemia  -S/P palliative radiotherapy 09/2021, pending definitive concurrent chemoradiation therapy, with subjective and objective improvement  -Followed by sequential chemotherapy with cisplatin/docetaxel q3 weeks x4 cycles (10/2021-12/2021)  -considerable improvement  -followed by consolidation durvalumab every 4 weeks X 12 cycles " (01/25/2022-12/06/2022)   -no recurrence or metastases on surveillance CT chest without contrast 02/06/2023  -slightly larger right pleural effusion on surveillance chest CT without contrast 02/06/2023  -development of axitinib pleural effusion 02/15/2023, 700 cc removed; cytology never sent for  -CHRIS on chest CT 05/09/2023, 08/16/2023  -08/28/2023:  Patient canceled thoracentesis with IR  -CHIRS on CT chest without contrast 11/20/2023      Molecular markers:  -EGFR mutation negative; PD-L1 negative (TPS 0%); KRAS mutation negative;  -ALK gene rearrangement negative; ROS1 gene rearrangement negative; NTRK NGS fusion panel negative  -BRAF mutation negative; RET gene rearrangement not detected         Microcytic anemia (relative iron deficiency +/- anemia of chronic disease/malignancy):  -Hemoglobin 6.8-9.3  -MCV 79.7  -MCH, MCHC low  -Stool for occult blood negative x2 (09/2021)  -B12 normal, 755 (09/06/2021)  -Serum iron 9, TIBC 157, transferrin saturation 6%, ferritin 795.5 (09/06/2021)  -Folate low, 6.5 (09/06/2021  -09/30/2021: Serum iron 24, TIBC 220, transferrin saturation 11%, ferritin 714.65 (anemia of chronic disease/relative iron deficiency); B12 level 1046; RBC folate 1147; hemoglobin 10.5         Hypercalcemia of malignancy:  -Calcium level:  10.3 on 08/06/2021 (albumin 2.5), etc.  -08/07/2021: Intact PTH level normal, 12.7; PTH related peptide <2.0  -09/10/2021: PTH related peptide <2.0  -Zometa 4 mg IV x1 (09/12/2021) >> hypercalcemia resolved   -09/30/2021: Ionized calcium level 5.1, normal (Serum calcium 9.3, albumin 2.6, corrected calcium 10.42)         Thrombocytosis:   (Subsequently, spontaneously resolved)  -Platelet count 413-588K  -Almost certainly, secondary to underlying malignancy and relative iron deficiency  -09/30/2021: ELDON-2 mutation negative  -10/08/2021: BCR-ABL1 1 fusion transcripts negative         Hyponatremia secondary to SIADH:  -Sodium 128-130  -Treated with fluid restriction, salt  tablets (by nephrology)        Plan:  Repeat noncontrast chest CT for surveillance in mid February 2024, then follow-up with CBC and CMP  ------------------------      Squamous cell carcinoma of right lung, cT4 cN2 M0, stage IIIB  Palliative radiotherapy 09/2021  Followed by sequential chemotherapy with cisplatin/docetaxel X 4 cycles (10/2021-12/2021)   Followed by consolidation durvalumab 01/2022-12/2022  >>>  Continue surveillance for stage III non-small cell lung cancer, treated with chemoradiation therapy  -Completed radiotherapy, followed by sequential chemotherapy 12/2021  -History and physical and chest CT +/- contrast every 3 months for 3 years (12/2021-12/2024), then every 6 months for 2 years (12/2024-12/2026), then history and physical and low-dose noncontrast enhanced chest CT annually  >>>  -CHRIS on surveillance noncontrast chest CT 02/06/2023  -exudative right-sided pleural effusion, 700 cc (02/2023)  -CHRIS on surveillance CT chest with contrast 05/09/2023, 08/16/2023  -08/28/2023:  Patient canceled thoracentesis with IR  -CHRIS on CT chest without contrast 11/20/2023   >>>  Repeat noncontrast chest CT for surveillance in 3 months (mid February 2024)    -Microcytic anemia  -Anemia of chronic disease/relative iron deficiency  -not an issue at this time    -03/03/2023: Anxiety and mood swings; no suicidal or homicidal ideation; no psychotic symptoms; follow-up with behavioral health     Hypercalcemia of malignancy  -S/p Zometa 4 mg IV x1 (09/12/2021)    Thrombocytosis; reactive; subsequently, spontaneously resolved     Hyponatremia secondary to SIADH secondary to malignancy  -Managed by renal; managed with fluid restriction  -resolved    Repeat noncontrast chest CT for surveillance in mid February 2024, then follow-up with CBC and CMP     Above discussed with him.  All questions answered.  Discussed labs and scans and gave him copies of relevant report.  He understands and agrees with this  plan.      Follow-up:  No follow-ups on file.

## 2023-12-12 ENCOUNTER — INFUSION (OUTPATIENT)
Dept: INFUSION THERAPY | Facility: HOSPITAL | Age: 48
End: 2023-12-12
Attending: INTERNAL MEDICINE
Payer: MEDICAID

## 2023-12-12 ENCOUNTER — OFFICE VISIT (OUTPATIENT)
Dept: HEMATOLOGY/ONCOLOGY | Facility: CLINIC | Age: 48
End: 2023-12-12
Attending: INTERNAL MEDICINE
Payer: MEDICAID

## 2023-12-12 VITALS
HEART RATE: 90 BPM | HEIGHT: 63 IN | WEIGHT: 131.81 LBS | OXYGEN SATURATION: 98 % | SYSTOLIC BLOOD PRESSURE: 120 MMHG | DIASTOLIC BLOOD PRESSURE: 70 MMHG | TEMPERATURE: 98 F | BODY MASS INDEX: 23.36 KG/M2 | RESPIRATION RATE: 18 BRPM

## 2023-12-12 DIAGNOSIS — E83.52 HYPERCALCEMIA: Chronic | ICD-10-CM

## 2023-12-12 DIAGNOSIS — C34.91 SQUAMOUS CELL CARCINOMA OF LUNG, STAGE III, RIGHT: ICD-10-CM

## 2023-12-12 DIAGNOSIS — C34.90 SQUAMOUS CELL CARCINOMA OF LUNG, UNSPECIFIED LATERALITY: Primary | ICD-10-CM

## 2023-12-12 DIAGNOSIS — E22.2 SIADH (SYNDROME OF INAPPROPRIATE ADH PRODUCTION): Chronic | ICD-10-CM

## 2023-12-12 DIAGNOSIS — J90 EXUDATIVE PLEURAL EFFUSION: ICD-10-CM

## 2023-12-12 DIAGNOSIS — J90 EXUDATIVE PLEURAL EFFUSION: Primary | Chronic | ICD-10-CM

## 2023-12-12 DIAGNOSIS — J90 RECURRENT RIGHT PLEURAL EFFUSION: Chronic | ICD-10-CM

## 2023-12-12 DIAGNOSIS — C34.91 SQUAMOUS CELL CARCINOMA OF LUNG, STAGE III, RIGHT: Primary | ICD-10-CM

## 2023-12-12 DIAGNOSIS — D50.8 OTHER IRON DEFICIENCY ANEMIA: Chronic | ICD-10-CM

## 2023-12-12 PROCEDURE — 99213 OFFICE O/P EST LOW 20 MIN: CPT | Mod: S$PBB,,, | Performed by: INTERNAL MEDICINE

## 2023-12-12 PROCEDURE — 1159F PR MEDICATION LIST DOCUMENTED IN MEDICAL RECORD: ICD-10-PCS | Mod: CPTII,,, | Performed by: INTERNAL MEDICINE

## 2023-12-12 PROCEDURE — 1160F RVW MEDS BY RX/DR IN RCRD: CPT | Mod: CPTII,,, | Performed by: INTERNAL MEDICINE

## 2023-12-12 PROCEDURE — 1159F MED LIST DOCD IN RCRD: CPT | Mod: CPTII,,, | Performed by: INTERNAL MEDICINE

## 2023-12-12 PROCEDURE — 99213 PR OFFICE/OUTPT VISIT, EST, LEVL III, 20-29 MIN: ICD-10-PCS | Mod: S$PBB,,, | Performed by: INTERNAL MEDICINE

## 2023-12-12 PROCEDURE — A4216 STERILE WATER/SALINE, 10 ML: HCPCS | Performed by: INTERNAL MEDICINE

## 2023-12-12 PROCEDURE — 1160F PR REVIEW ALL MEDS BY PRESCRIBER/CLIN PHARMACIST DOCUMENTED: ICD-10-PCS | Mod: CPTII,,, | Performed by: INTERNAL MEDICINE

## 2023-12-12 PROCEDURE — 96523 IRRIG DRUG DELIVERY DEVICE: CPT

## 2023-12-12 PROCEDURE — 63600175 PHARM REV CODE 636 W HCPCS: Performed by: INTERNAL MEDICINE

## 2023-12-12 PROCEDURE — 99213 OFFICE O/P EST LOW 20 MIN: CPT | Mod: PBBFAC | Performed by: INTERNAL MEDICINE

## 2023-12-12 PROCEDURE — 25000003 PHARM REV CODE 250: Performed by: INTERNAL MEDICINE

## 2023-12-12 RX ORDER — HEPARIN 100 UNIT/ML
500 SYRINGE INTRAVENOUS
Status: DISCONTINUED | OUTPATIENT
Start: 2023-12-12 | End: 2023-12-12 | Stop reason: HOSPADM

## 2023-12-12 RX ORDER — HEPARIN 100 UNIT/ML
500 SYRINGE INTRAVENOUS
Status: CANCELLED | OUTPATIENT
Start: 2023-12-12

## 2023-12-12 RX ORDER — SODIUM CHLORIDE 0.9 % (FLUSH) 0.9 %
10 SYRINGE (ML) INJECTION
Status: DISCONTINUED | OUTPATIENT
Start: 2023-12-12 | End: 2023-12-12 | Stop reason: HOSPADM

## 2023-12-12 RX ORDER — SODIUM CHLORIDE 0.9 % (FLUSH) 0.9 %
10 SYRINGE (ML) INJECTION
Status: CANCELLED | OUTPATIENT
Start: 2023-12-12

## 2023-12-12 RX ADMIN — Medication 10 ML: at 01:12

## 2023-12-12 RX ADMIN — HEPARIN 500 UNITS: 100 SYRINGE at 01:12

## 2024-02-06 ENCOUNTER — HOSPITAL ENCOUNTER (OUTPATIENT)
Dept: RADIOLOGY | Facility: HOSPITAL | Age: 49
Discharge: HOME | End: 2024-02-06
Attending: INTERNAL MEDICINE
Payer: MEDICAID

## 2024-02-06 DIAGNOSIS — J90 EXUDATIVE PLEURAL EFFUSION: ICD-10-CM

## 2024-02-06 DIAGNOSIS — C34.91 SQUAMOUS CELL CARCINOMA OF LUNG, STAGE III, RIGHT: ICD-10-CM

## 2024-02-06 PROCEDURE — 71250 CT THORAX DX C-: CPT | Mod: TC

## 2024-02-20 DIAGNOSIS — C34.91 SQUAMOUS CELL CARCINOMA OF LUNG, STAGE III, RIGHT: Primary | ICD-10-CM

## 2024-02-21 ENCOUNTER — OFFICE VISIT (OUTPATIENT)
Dept: HEMATOLOGY/ONCOLOGY | Facility: CLINIC | Age: 49
End: 2024-02-21
Attending: INTERNAL MEDICINE
Payer: MEDICAID

## 2024-02-21 ENCOUNTER — INFUSION (OUTPATIENT)
Dept: INFUSION THERAPY | Facility: HOSPITAL | Age: 49
End: 2024-02-21
Attending: INTERNAL MEDICINE
Payer: MEDICAID

## 2024-02-21 VITALS
HEIGHT: 63 IN | SYSTOLIC BLOOD PRESSURE: 112 MMHG | SYSTOLIC BLOOD PRESSURE: 120 MMHG | DIASTOLIC BLOOD PRESSURE: 80 MMHG | OXYGEN SATURATION: 99 % | HEART RATE: 108 BPM | TEMPERATURE: 99 F | RESPIRATION RATE: 20 BRPM | DIASTOLIC BLOOD PRESSURE: 69 MMHG | HEART RATE: 110 BPM | OXYGEN SATURATION: 98 % | WEIGHT: 129 LBS | RESPIRATION RATE: 18 BRPM | BODY MASS INDEX: 22.86 KG/M2 | TEMPERATURE: 100 F

## 2024-02-21 DIAGNOSIS — Z92.21 STATUS POST CHEMORADIATION: ICD-10-CM

## 2024-02-21 DIAGNOSIS — C34.91 SQUAMOUS CELL CARCINOMA OF RIGHT LUNG: Primary | ICD-10-CM

## 2024-02-21 DIAGNOSIS — D50.8 OTHER IRON DEFICIENCY ANEMIA: Chronic | ICD-10-CM

## 2024-02-21 DIAGNOSIS — J90 EXUDATIVE PLEURAL EFFUSION: Primary | Chronic | ICD-10-CM

## 2024-02-21 DIAGNOSIS — J90 PLEURAL EFFUSION, RIGHT: ICD-10-CM

## 2024-02-21 DIAGNOSIS — J90 EXUDATIVE PLEURAL EFFUSION: ICD-10-CM

## 2024-02-21 DIAGNOSIS — Z08 ENCOUNTER FOR FOLLOW-UP SURVEILLANCE OF LUNG CANCER: ICD-10-CM

## 2024-02-21 DIAGNOSIS — C34.91 SQUAMOUS CELL CARCINOMA OF RIGHT LUNG: Chronic | ICD-10-CM

## 2024-02-21 DIAGNOSIS — E22.2 SIADH (SYNDROME OF INAPPROPRIATE ADH PRODUCTION): Chronic | ICD-10-CM

## 2024-02-21 DIAGNOSIS — Z92.3 STATUS POST CHEMORADIATION: ICD-10-CM

## 2024-02-21 DIAGNOSIS — Z85.118 ENCOUNTER FOR FOLLOW-UP SURVEILLANCE OF LUNG CANCER: ICD-10-CM

## 2024-02-21 DIAGNOSIS — C34.90 SQUAMOUS CELL CARCINOMA OF LUNG, UNSPECIFIED LATERALITY: Primary | ICD-10-CM

## 2024-02-21 DIAGNOSIS — E83.52 HYPERCALCEMIA: Chronic | ICD-10-CM

## 2024-02-21 PROCEDURE — 3008F BODY MASS INDEX DOCD: CPT | Mod: CPTII,,, | Performed by: INTERNAL MEDICINE

## 2024-02-21 PROCEDURE — A4216 STERILE WATER/SALINE, 10 ML: HCPCS | Performed by: INTERNAL MEDICINE

## 2024-02-21 PROCEDURE — 3074F SYST BP LT 130 MM HG: CPT | Mod: CPTII,,, | Performed by: INTERNAL MEDICINE

## 2024-02-21 PROCEDURE — 1160F RVW MEDS BY RX/DR IN RCRD: CPT | Mod: CPTII,,, | Performed by: INTERNAL MEDICINE

## 2024-02-21 PROCEDURE — 99214 OFFICE O/P EST MOD 30 MIN: CPT | Mod: S$PBB,,, | Performed by: INTERNAL MEDICINE

## 2024-02-21 PROCEDURE — 96523 IRRIG DRUG DELIVERY DEVICE: CPT

## 2024-02-21 PROCEDURE — 3078F DIAST BP <80 MM HG: CPT | Mod: CPTII,,, | Performed by: INTERNAL MEDICINE

## 2024-02-21 PROCEDURE — 25000003 PHARM REV CODE 250: Performed by: INTERNAL MEDICINE

## 2024-02-21 PROCEDURE — 63600175 PHARM REV CODE 636 W HCPCS: Performed by: INTERNAL MEDICINE

## 2024-02-21 PROCEDURE — 99213 OFFICE O/P EST LOW 20 MIN: CPT | Mod: PBBFAC,25 | Performed by: INTERNAL MEDICINE

## 2024-02-21 PROCEDURE — 1159F MED LIST DOCD IN RCRD: CPT | Mod: CPTII,,, | Performed by: INTERNAL MEDICINE

## 2024-02-21 RX ORDER — HEPARIN 100 UNIT/ML
500 SYRINGE INTRAVENOUS
Status: DISCONTINUED | OUTPATIENT
Start: 2024-02-21 | End: 2024-02-21 | Stop reason: HOSPADM

## 2024-02-21 RX ORDER — HEPARIN 100 UNIT/ML
500 SYRINGE INTRAVENOUS
OUTPATIENT
Start: 2024-02-21

## 2024-02-21 RX ORDER — SODIUM CHLORIDE 0.9 % (FLUSH) 0.9 %
10 SYRINGE (ML) INJECTION
OUTPATIENT
Start: 2024-02-21

## 2024-02-21 RX ORDER — SODIUM CHLORIDE 0.9 % (FLUSH) 0.9 %
10 SYRINGE (ML) INJECTION
Status: DISCONTINUED | OUTPATIENT
Start: 2024-02-21 | End: 2024-02-21 | Stop reason: HOSPADM

## 2024-02-21 RX ADMIN — Medication 500 UNITS: at 01:02

## 2024-02-21 RX ADMIN — Medication 10 ML: at 01:02

## 2024-02-21 NOTE — NURSING
1347  Pt did labs and came to unit to get his MP flushed since he had not been scheduled.  Pt also has an appt with the provider.  Pt denies any pain or complaint.  MP flushed easily but no blood return noted even with different manueuvers.  MP flushed with saline and heparin.  Pt should get appt after seeing provider for q 3 mon f/u with MP flush.

## 2024-02-21 NOTE — Clinical Note
Repeat noncontrast chest CT for surveillance in 3 months (May)  -repeat screening colonoscopy in 10 years (in 2034) Follow-up with behavioral health for anxiety and depression Follow-up in 3 months with chest CT, CBC, CMP

## 2024-02-21 NOTE — PROGRESS NOTES
History:  Past Medical History:   Diagnosis Date    Anemia of chronic disease 5/15/2022    Attention deficit hyperactivity disorder (ADHD), predominantly inattentive type 7/15/2022    Cachexia 7/15/2022    Depression with anxiety 7/15/2022    Hypercalcemia 5/15/2022    Iron deficiency anemia 5/15/2022    Lung cancer     Pleural effusion on right 7/9/2022    Pure hypercholesterolemia 10/27/2022    SIADH (syndrome of inappropriate ADH production) 5/15/2022    Squamous cell carcinoma of lung 3/21/2022    Thrombocytosis 10/16/2023    Tobacco user 7/15/2022   Past medical history: Attention deficit disorder. Tobacco abuse. Tonsillectomy.  Social history: .  Lives in [place], [place].  Has 2 children.  Has worked as a  all his life (more than 30-35 years).  Smoked 1-1/2 packs of cigarettes daily for 15 years; then, 1 pack daily for 1 year; now, for last 2 months, 2 cigarettes daily.  Heavy alcohol use in the past; 8-10 beers daily for 10 years; quit 1-1/2 years ago.  No illicit drugs.  Family history: No family members with cancer.  Health maintenance: So far, does not have a primary care physician.  Apparently, he is going to see a primary care physician for the first time next week.  Past Surgical History:   Procedure Laterality Date    Bronchoscopy w endobronchial biopsy  08/18/2021    COLONOSCOPY  02/05/2024    Endobronchial ablation of left main stem occlusion  08/27/2021    MEDIPORT INSERTION, SINGLE  10/11/2021    TONSILLECTOMY  1981    US guided right thoracentesis Right 04/22/2022      Social History     Socioeconomic History    Marital status:      Spouse name: Veronica Pratt    Number of children: 2   Occupational History    Occupation:    Tobacco Use    Smoking status: Former     Current packs/day: 0.00     Average packs/day: 1.5 packs/day for 31.0 years (46.5 ttl pk-yrs)     Types: Cigarettes     Start date: 1/1/1990     Quit date: 1/1/2021     Years since  quitting: 3.1    Smokeless tobacco: Never   Substance and Sexual Activity    Alcohol use: Yes    Drug use: Never    Sexual activity: Yes      Family History   Problem Relation Age of Onset    ALS Mother 40    Nephrolithiasis Father     Cholecystitis Father     Psychosis Sister     Diabetes type II Son       Reason for Follow-up:  -Squamous cell carcinoma of lung, stage III  -right-sided exudative pleural effusion (S/P thoracentesis 02/15/2023)  -Microcytic anemia (relative iron deficiency)  -Hypercalcemia of malignancy  -Thrombocytosis  -Hyponatremia (SIADH)       History of Present Illness:   Squamous cell carcinoma of lung, unspecified laterality        Oncologic/Hematologic History:  Oncology History   Squamous cell carcinoma of right lung   1/25/2022 - 12/6/2022 Chemotherapy    Treatment Summary   Plan Name: OP DURVALUMAB 1500 MG Q4W  Treatment Goal: Control  Status: Inactive  Start Date: 1/25/2022  End Date: 12/6/2022  Provider: Brendan Amaral MD  Chemotherapy: [No matching medication found in this treatment plan]     3/21/2022 Initial Diagnosis    Squamous cell carcinoma of lung     5/15/2022 Cancer Staged    Staging form: Lung, AJCC 8th Edition  - Clinical stage from 5/15/2022: Stage IIIB (cT4, cN2, cM0)     Patient is being followed for history of squamous cell carcinoma of lung, stage IIIB or stage IIIC, treated with chemoradiation therapy, followed by consolidation durvalumab.      Please see assessment and plan section for details.     09/20/2021:  Presents for initial medical oncology consultation    Interval History:  PORT FLUSH   [No matching plan found]   02/21/2024:   -02/05/2024: Colonoscopy (Dr. Galen Macias, GI):  Multiple diverticula seen in the sigmoid colon and descending colon; repeat colonoscopy in 10 years  -02/06/2024:  Surveillance CT chest without contrast:  No significant interval change from previous exam; posttreatment changes; no recurrence or metastases  -02/21/2024: Labs  reviewed; CBC unremarkable; CMP reviewed; sodium 134, slightly low; otherwise, CMP unremarkable  Presents for a follow-up visit.  Overall, stable.  Quit smoking 20 half years ago.  Great appetite.  No chest pain, cough, dyspnea, or hemoptysis.  No fevers or chills.  No anorexia or unintentional weight loss.  No new lumps or lymphadenopathy.  No unusual headaches, focal neurological symptoms, abdominal pain, nausea, vomiting, etc..  ECOG 0-1.    Medications:  Current Outpatient Medications on File Prior to Visit   Medication Sig Dispense Refill    clindamycin (CLEOCIN) 300 MG capsule Take 1 capsule by mouth 3 (three) times daily.      cloNIDine (CATAPRES) 0.1 MG tablet Take 1 tablet (0.1 mg total) by mouth nightly. 90 tablet 1    sertraline (ZOLOFT) 100 MG tablet Take 1 tablet (100 mg total) by mouth once daily. 90 tablet 1    EPINEPHrine (EPIPEN 2-SUSANA) 0.3 mg/0.3 mL AtIn Inject 0.3 mLs (0.3 mg total) into the muscle once. for 1 dose 0.3 mL 3    predniSONE (DELTASONE) 10 MG tablet Take 1 tablet (10 mg total) by mouth daily as needed (wasp sting). (Patient not taking: Reported on 8/23/2023) 10 tablet 4     No current facility-administered medications on file prior to visit.     Review of Systems:   All systems reviewed and found to be negative except for the symptoms detailed above    Physical Examination:   VITAL SIGNS:   Vitals:    02/21/24 1409   BP: 112/69   Pulse: 110   Resp: 18   Temp: 98.6 °F (37 °C)       GENERAL:  In no apparent distress.    HEAD:  No signs of head trauma.  EYES:  Pupils are equal.  Extraocular motions intact.    EARS:  Hearing grossly intact.  MOUTH:  Oropharynx is normal.   NECK:  No adenopathy, no JVD.     CHEST:  Chest with clear breath sounds bilaterally.  No wheezes, rales, rhonchi.    CARDIAC:  Regular rate and rhythm.  S1 and S2, without murmurs, gallops, rubs.  VASCULAR:  No Edema.  Peripheral pulses normal and equal in all extremities.  ABDOMEN:  Soft, without detectable tenderness.  " No sign of distention.  No   rebound or guarding, and no masses palpated.   Bowel Sounds normal.  MUSCULOSKELETAL:  Good range of motion of all major joints. Extremities without clubbing, cyanosis or edema.    NEUROLOGIC EXAM:  Alert and oriented x 3.  No focal sensory or strength deficits.   Speech normal.  Follows commands.  PSYCHIATRIC:  Mood normal.    No results for input(s): "CBC" in the last 72 hours.   No results for input(s): "CMP" in the last 72 hours.     Assessment:  Problem List Items Addressed This Visit          Pulmonary    Exudative pleural effusion - Primary (Chronic)    RESOLVED: Pleural effusion, right       Renal/    Hypercalcemia (Chronic)       Oncology    Squamous cell carcinoma of right lung (Chronic)    Iron deficiency anemia (Chronic)    Status post chemoradiation    RESOLVED: Encounter for follow-up surveillance of lung cancer       Endocrine    SIADH (syndrome of inappropriate ADH production) (Chronic)       Presented with locally advanced, obviously inoperable squamous cell carcinoma of lung;  large, 10 cm mediastinal mass, possibly arising in the right lower lung lobe, causing compressive effects on right mainstem bronchus and right pulmonary artery;  no metastases;  s/p bronchoscopic biopsy of carinal mass 08/18/2021;  large fungating mass at maximiliano, obliterating the right mainstem bronchus and partially occluding left mainstem;  causing hyponatremia, hypercalcemia;  worsening dyspnea, fever, postobstructive pneumonia     Squamous cell carcinoma of right lung:  -bronchoscopy 08/18/2021  -10 cm subcarinal mass with compressive effects, right lower lung lobe large masslike consolidation with central cavitation, large fungating mass at maximiliano, obliterating the right mainstem bronchus and partially occluding left mainstem  -cT4 cN2 M0, stage IIIB  -postobstructive pneumonia and hospitalization  -S/P laser tumor debulking at [facility] 08/27/2021  -hyponatremia secondary to " SIADH  -hypercalcemia  -S/P palliative radiotherapy 09/2021, pending definitive concurrent chemoradiation therapy, with subjective and objective improvement  -Followed by sequential chemotherapy with cisplatin/docetaxel q3 weeks x4 cycles (10/2021-12/2021)  -considerable improvement  -followed by consolidation durvalumab every 4 weeks X 12 cycles (01/25/2022-12/06/2022)   -no recurrence or metastases on surveillance CT chest without contrast 02/06/2023  -slightly larger right pleural effusion on surveillance chest CT without contrast 02/06/2023  -development of axitinib pleural effusion 02/15/2023, 700 cc removed; cytology never sent for  -CHRIS on chest CT 05/09/2023, 08/16/2023  -08/28/2023:  Patient canceled thoracentesis with IR  -CHRIS on CT chest without contrast 11/20/2023  -02/05/2024: Colonoscopy (Dr. Galen Macias, GI):  Multiple diverticula seen in the sigmoid colon and descending colon; repeat colonoscopy in 10 years  -02/06/2024:  Surveillance CT chest without contrast:  No significant interval change from previous exam; posttreatment changes; no recurrence or metastases      Molecular markers:  -EGFR mutation negative; PD-L1 negative (TPS 0%); KRAS mutation negative;  -ALK gene rearrangement negative; ROS1 gene rearrangement negative; NTRK NGS fusion panel negative  -BRAF mutation negative; RET gene rearrangement not detected         Microcytic anemia (relative iron deficiency +/- anemia of chronic disease/malignancy):  -Hemoglobin 6.8-9.3  -MCV 79.7  -MCH, MCHC low  -Stool for occult blood negative x2 (09/2021)  -B12 normal, 755 (09/06/2021)  -Serum iron 9, TIBC 157, transferrin saturation 6%, ferritin 795.5 (09/06/2021)  -Folate low, 6.5 (09/06/2021  -09/30/2021: Serum iron 24, TIBC 220, transferrin saturation 11%, ferritin 714.65 (anemia of chronic disease/relative iron deficiency); B12 level 1046; RBC folate 1147; hemoglobin 10.5         Hypercalcemia of malignancy:  -Calcium level:  10.3 on 08/06/2021  (albumin 2.5), etc.  -08/07/2021: Intact PTH level normal, 12.7; PTH related peptide <2.0  -09/10/2021: PTH related peptide <2.0  -Zometa 4 mg IV x1 (09/12/2021) >> hypercalcemia resolved   -09/30/2021: Ionized calcium level 5.1, normal (Serum calcium 9.3, albumin 2.6, corrected calcium 10.42)         Thrombocytosis:   (Subsequently, spontaneously resolved)  -Platelet count 413-588K  -Almost certainly, secondary to underlying malignancy and relative iron deficiency  -09/30/2021: ELDON-2 mutation negative  -10/08/2021: BCR-ABL1 1 fusion transcripts negative         Hyponatremia secondary to SIADH:  -Sodium 128-130  -Treated with fluid restriction, salt tablets (by nephrology)        Plan:  Repeat noncontrast chest CT for surveillance in 3 months (May)   -repeat screening colonoscopy in 10 years (in 2034)  Follow-up with behavioral health for anxiety and depression  Follow-up in 3 months with chest CT, CBC, CMP  --------------------------------    Squamous cell carcinoma of right lung, cT4 cN2 M0, stage IIIB  Palliative radiotherapy 09/2021  Followed by sequential chemotherapy with cisplatin/docetaxel X 4 cycles (10/2021-12/2021)   Followed by consolidation durvalumab 01/2022-12/2022  >>>  Continue surveillance for stage III non-small cell lung cancer, treated with chemoradiation therapy  -Completed radiotherapy, followed by sequential chemotherapy 12/2021  -History and physical and chest CT +/- contrast every 3 months for 3 years (12/2021-12/2024), then every 6 months for 2 years (12/2024-12/2026), then history and physical and low-dose noncontrast enhanced chest CT annually  >>>  -CHRIS on surveillance noncontrast chest CT 02/06/2023  -exudative right-sided pleural effusion, 700 cc (02/2023)  -CHRIS on surveillance CT chest with contrast 05/09/2023, 08/16/2023  -08/28/2023:  Patient canceled thoracentesis with IR  -CHRIS on CT chest without contrast 11/20/2023  -02/05/2024: Colonoscopy (Dr. Galen Macias, GI):  Multiple  diverticula seen in the sigmoid colon and descending colon; repeat colonoscopy in 10 years  -02/06/2024:  Surveillance CT chest without contrast:  No significant interval change from previous exam; posttreatment changes; no recurrence or metastases   >>>  Repeat noncontrast chest CT for surveillance in 3 months (May)   -repeat screening colonoscopy in 10 years (in 2034)    -Microcytic anemia  -Anemia of chronic disease/relative iron deficiency  -not an issue at this time    -03/03/2023: Anxiety and mood swings; no suicidal or homicidal ideation; no psychotic symptoms; follow-up with behavioral health  -02/21/2024:  As of now, stable     Hypercalcemia of malignancy  -S/p Zometa 4 mg IV x1 (09/12/2021)    Thrombocytosis; reactive; subsequently, spontaneously resolved     Hyponatremia secondary to SIADH secondary to malignancy  -Managed by renal; managed with fluid restriction  -resolved    Follow-up in 3 months with chest CT, CBC, CMP     Above discussed with him.  All questions answered.  Discussed labs and scans and gave him copies of relevant report.  He understands and agrees with this plan.      Follow-up:  No follow-ups on file.

## 2024-04-22 DIAGNOSIS — F41.9 ANXIETY: ICD-10-CM

## 2024-04-22 DIAGNOSIS — F90.9 ADULT ADHD: ICD-10-CM

## 2024-04-22 RX ORDER — CLONIDINE HYDROCHLORIDE 0.1 MG/1
0.1 TABLET ORAL NIGHTLY
Qty: 90 TABLET | Refills: 1 | Status: SHIPPED | OUTPATIENT
Start: 2024-04-22 | End: 2024-05-14 | Stop reason: SDUPTHER

## 2024-05-06 ENCOUNTER — HOSPITAL ENCOUNTER (OUTPATIENT)
Dept: RADIOLOGY | Facility: HOSPITAL | Age: 49
Discharge: HOME | End: 2024-05-06
Attending: INTERNAL MEDICINE
Payer: MEDICAID

## 2024-05-06 DIAGNOSIS — J90 EXUDATIVE PLEURAL EFFUSION: ICD-10-CM

## 2024-05-06 DIAGNOSIS — C34.91 SQUAMOUS CELL CARCINOMA OF RIGHT LUNG: ICD-10-CM

## 2024-05-06 PROCEDURE — 71250 CT THORAX DX C-: CPT | Mod: TC

## 2024-05-14 ENCOUNTER — OFFICE VISIT (OUTPATIENT)
Dept: BEHAVIORAL HEALTH | Facility: CLINIC | Age: 49
End: 2024-05-14
Payer: MEDICAID

## 2024-05-14 DIAGNOSIS — F41.9 ANXIETY: ICD-10-CM

## 2024-05-14 DIAGNOSIS — F90.9 ADULT ADHD: Primary | ICD-10-CM

## 2024-05-14 DIAGNOSIS — F41.8 DEPRESSION WITH ANXIETY: Chronic | ICD-10-CM

## 2024-05-14 PROCEDURE — 1160F RVW MEDS BY RX/DR IN RCRD: CPT | Mod: CPTII,NDTC,, | Performed by: NURSE PRACTITIONER

## 2024-05-14 PROCEDURE — 1159F MED LIST DOCD IN RCRD: CPT | Mod: CPTII,NDTC,, | Performed by: NURSE PRACTITIONER

## 2024-05-14 PROCEDURE — 99212 OFFICE O/P EST SF 10 MIN: CPT | Mod: SA,HB,S$PBB,NDTC | Performed by: NURSE PRACTITIONER

## 2024-05-14 RX ORDER — DEXTROAMPHETAMINE SACCHARATE, AMPHETAMINE ASPARTATE, DEXTROAMPHETAMINE SULFATE AND AMPHETAMINE SULFATE 2.5; 2.5; 2.5; 2.5 MG/1; MG/1; MG/1; MG/1
10 TABLET ORAL 2 TIMES DAILY
Qty: 60 TABLET | Refills: 0 | Status: SHIPPED | OUTPATIENT
Start: 2024-05-14 | End: 2024-06-10 | Stop reason: DRUGHIGH

## 2024-05-14 RX ORDER — SERTRALINE HYDROCHLORIDE 100 MG/1
100 TABLET, FILM COATED ORAL DAILY
Qty: 90 TABLET | Refills: 1 | Status: SHIPPED | OUTPATIENT
Start: 2024-05-14

## 2024-05-14 RX ORDER — CLONIDINE HYDROCHLORIDE 0.1 MG/1
0.1 TABLET ORAL NIGHTLY
Qty: 90 TABLET | Refills: 1 | Status: SHIPPED | OUTPATIENT
Start: 2024-05-14

## 2024-05-14 NOTE — PROGRESS NOTES
Follow-up #4   05/14/2024  HPI: Deepak Pratt is a 48 y.o. male here today for a psychiatric evaluation referred by PCP to the [facility] for depression and anxiety  Past Medical History: Squamous cell carcinoma of lung, Tobacco user Anemia of chronic disease, Cachexia, Hypercalcemia, Iron deficiency anemia, Pleural effusion on right, Pure hypercholesterolemia, SIADH (syndrome of inappropriate ADH production), ADHD, predominantly inattentive type, Depression with anxiety    On his last visit, no medication changes needed.    Today, patient states that he is doing well. States that he still has some anxiety especially when he has bigger work loads. He has tried Buspar in the past but it caused zapps in his head. He is anxious a couple of days of the week. He starts to sweat thinking about all that he has to do. He states that Adderall used to help him. He used to take 30mg TID. This was before his cancer diagnosis. He has been off of the Adderall for about 3 years.   Will start Adderall 10mg twice a day.     FU in 8 weeks    PHQ Score:   05/14/2024: none  11/14/2023: 0  07/13/2023: 1 minimal  05/15/2023: 1  03/24/2023: 5    DELIO-7 Score:   05/14/2024: mild  11/14/2023: 0  07/13/2023: 0 normal  05/15/2023: 3   03/24/2023: 10    Mental Status Evaluation:  Appearance:  age appropriate, casually dressed, neatly groomed   Behavior:  normal, cooperative   Speech:  no latency; no press   Mood:  euthymic   Affect:  mood-congruent   Thought Process:  normal and logical   Thought Content:  normal, no suicidality, no homicidality, delusions, or paranoia   Sensorium:  grossly intact   Cognition:  grossly intact   Insight:  intact   Judgment:  behavior is adequate to circumstances     Impression:  Anxiety/Separation Anxiety  2. MDD  3. ADHD - inattentive    Plan:  Continue Clonidine 0.1mg at bedtime for both anxiety and inattention  2. Continue Zoloft to 100mg at HS for depression and anxiety  3. Start Adderall 10mg BID   4.  Follow-up in 8 weeks virtual      Follow-up #3   11/14/2023  HPI: Deepak Pratt is a 48 y.o. male here today for a psychiatric evaluation referred by PCP to the [facility] for depression and anxiety  Past Medical History: Squamous cell carcinoma of lung, Tobacco user Anemia of chronic disease, Cachexia, Hypercalcemia, Iron deficiency anemia, Pleural effusion on right, Pure hypercholesterolemia, SIADH (syndrome of inappropriate ADH production), ADHD, predominantly inattentive type, Depression with anxiety    On his last visit, no medication changes needed.    Today, patient states that he is doing well. He is working. Most days he is off by noon.   No increase in his anxiety.   No med changes today.     FU in 6 months - virtual    PHQ Score:   11/14/2023: 0  07/13/2023: 1 minimal  05/15/2023: 1  03/24/2023: 5    DELIO-7 Score:   11/14/2023: 0  07/13/2023: 0 normal  05/15/2023: 3   03/24/2023: 10    Mental Status Evaluation:  Appearance:  age appropriate, casually dressed, neatly groomed   Behavior:  normal, cooperative   Speech:  no latency; no press   Mood:  euthymic   Affect:  mood-congruent   Thought Process:  normal and logical   Thought Content:  normal, no suicidality, no homicidality, delusions, or paranoia   Sensorium:  grossly intact   Cognition:  grossly intact   Insight:  intact   Judgment:  behavior is adequate to circumstances     Impression:  Anxiety/Separation Anxiety  2. MDD  3. ADHD - inattentive    Plan:  Continue Clonidine 0.1mg at bedtime for both anxiety and inattention  2. Continue Zoloft to 100mg at HS for depression and anxiety  3. Continue 1:1 therapy   4. Follow-up in 6 months - virtual      Follow-up #2  07/13/2023  HPI: Deepak Pratt is a 48 y.o. male here today for a psychiatric evaluation referred by PCP to the [facility] for depression and anxiety  Past Medical History: Squamous cell carcinoma of lung, Tobacco user Anemia of chronic disease, Cachexia, Hypercalcemia, Iron  deficiency anemia, Pleural effusion on right, Pure hypercholesterolemia, SIADH (syndrome of inappropriate ADH production), ADHD, predominantly inattentive type, Depression with anxiety    On his last visit, the Zoloft was increased to 100mg at HS.     Today, patient states that he is doing well.   He states that the increase in the Zoloft was helpful. Sates that he still has a little anxiety that is work related but that he feels more normal now than he has in a while.   He is also still taking the Clonidine 0.1mg at HS for anxiety and inattention.     He is sleeping well at night.   He is not having any issues with his blood pressure.     He states that the anxiety that he experiences is work related; that is expected and it passes once he gets started on the job.    No medication changes needed.  FU in 4 months    PHQ Score:   07/13/2023: 1 minimal  05/15/2023: 1  03/24/2023: 5    DELIO-7 Score:   07/13/2023: 0 normal  05/15/2023: 3   03/24/2023: 10    Mental Status Evaluation:  Appearance:  age appropriate, casually dressed, neatly groomed   Behavior:  normal, cooperative   Speech:  no latency; no press   Mood:  euthymic   Affect:  mood-congruent   Thought Process:  normal and logical   Thought Content:  normal, no suicidality, no homicidality, delusions, or paranoia   Sensorium:  grossly intact   Cognition:  grossly intact   Insight:  intact   Judgment:  behavior is adequate to circumstances     Impression:  Anxiety/Separation Anxiety  2. MDD  3. ADHD - inattentive    Plan:  Continue Clonidine 0.1mg at bedtime for both anxiety and inattention  2. Continue Zoloft to 100mg at HS for depression and anxiety  3. Continue 1:1 therapy   4. Follow-up in 4 months    Follow-up #1  05/15/2023  HPI: Deepak Pratt is a 48 y.o. male here today for a psychiatric evaluation referred by PCP to the [facility] for depression and anxiety  Past Medical History: Squamous cell carcinoma of lung, Tobacco user Anemia of chronic  disease, Cachexia, Hypercalcemia, Iron deficiency anemia, Pleural effusion on right, Pure hypercholesterolemia, SIADH (syndrome of inappropriate ADH production), ADHD, predominantly inattentive type, Depression with anxiety    On his initial visit, patient was started on Clonidine 0.1mg at bedtime for both anxiety and inattention and Zoloft 25mg at bedtime x 2 weeks and then increase to 50mg at bedtime.    Today, patient states that he is doing much better. He still has some anxiety but not as much.     States that he is having an easier time going back to work. And while at work, he is not concentrating on how quickly he can finish and get back home.   He is getting out more. He and his family went to [place] for his [employer] graduation. States that prior to starting the Zoloft and Clonidine, he wanted to go to [place] and come right back home. But, he found that he was able to enjoy the trip and they made a vacation out of the trip.   He is able to talk about his health without becoming tearful. He is feeling more positive.     Will increase the Zoloft to 100mg at HS and continue the Clonidine 0.1mg q HS.     FU in 6 weeks    PHQ Score:   05/15/2023: 1  03/24/2023: 5    DELIO-7 Score:   05/15/2023: 3   03/24/2023: 10    Mental Status Evaluation:  Appearance:  age appropriate, casually dressed, neatly groomed   Behavior:  normal, cooperative   Speech:  no latency; no press   Mood:  euthymic   Affect:  mood-congruent   Thought Process:  normal and logical   Thought Content:  normal, no suicidality, no homicidality, delusions, or paranoia   Sensorium:  grossly intact   Cognition:  grossly intact   Insight:  intact   Judgment:  behavior is adequate to circumstances     Impression:  Anxiety/Separation Anxiety  2. MDD  3. ADHD - inattentive    Plan:  Clonidine 0.1mg at bedtime for both anxiety and inattention  2. Increase Zoloft to 100mg at HS for depression and anxiety  3. Continue 1:1 therapy   4. Follow-up in 6 weeks    Initial  Interview  2023  HPI: Deepak Pratt is a 48 y.o. 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 He Once he gets to work, he is fine but finds it difficult to leave to go to work. He feels that he is lacking confidence.  He cannot identify triggers except that anxiety begins the day before (around 1200) he is to go to a job. He starts worrying about going to work the next day and becomes sweaty.   He states that he is the  and is worried about the job getting done correctly and on time.   Onset 2022 when he broke his ankle and could not go to work that day.      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   He was on Adderall at the time because he was inattentive and unorganized.    He does not want to get back on Adderall. He felt that it was working great but his wife states that his attitude was bad. He feels that he may have been addicted. The doses were getting higher and higher.     He explains that when he was originally diagnosed with cancer, he was given 2 months to two years to live. He lost 40lbs and was planning his . States that he did very well with chemo and radiation and now should live into old age. States that he should be happy but that he still gets very emotional.   When he was told about his diagnoses, his was never given any therapy OR he thinks that he may have refused it. His wife told him that he needed therapy  but he refused.    He states that he had started to accept the fact that he might die.   Mainly he was worried about his family having to live through his death.   He believes in a higher power.   He believes that there might be something after death but not sure what.     He wanted to be cremated.   He did not know whether or not death was going to be painful. He did not think about it too much.   He is not concerned about getting hurt at work.     May have some separation anxiety??    He does not have the energy that he used to prior to his cancer diagnosis. He says that he has a lot of people around to help him but he feels guilty for not having the energy or the will/desire.  He feels a little less than the man that he was.     He had a good appetite and sleeps well.     Will try Clonidine 0.1mg at HS to address both anxiety and inattention.   Will also start Zoloft 25mg every night to address anxiety and depression.     Medications:   Current Outpatient Medications   Medication Instructions    diclofenac sodium (VOLTAREN) 2 g, Topical (Top), 4 times daily PRN, Do not exceed 32 grams/day: do not to exceed 8 grams/day/single joint of upper extremities; do not to exceed 16 grams/day/single joint of lower extremities.  Please request refill of this medication from your PCP.    EPINEPHrine (EPIPEN 2-SUSANA) 0.3 mg, Intramuscular, Once    predniSONE (DELTASONE) 10 mg, Oral, Daily PRN        Psychiatric History:   Reports a prior psychiatric history: depression and anxiety  History of mental health out-patient treatment:   History of in-patient psychiatric hospitalization: denies  History of suicidal ideations: denies  History of suicidal threats:   History of suicide attempts: denies  History of self mutilation:     History of psychotropic medications:   Buspar 7.5mg - gave him a sharp/strong buzz for about 30minutes and then nothing  Adderall - for ADD    Family Psychiatric History:  Mental Illness: mother Bipolar  Disorder  Alcohol abuse/addiction:   Drug addiction:     Substance Use History:  Alcohol: Began drinking at 15 or 17 y/o. Hx 6-8 beers daily for 10 years.  Stopped drinking about 3 months prior to cancer diagnosis  Marijuana: denies  Benzodiazepines: denies  Opiates: denies  Stimulants: Adderall x15  years in the past - 90mg a day in the end. Stopped takikng the Adderall a month prior to being diagnosed with cancer  Cocaine: denies  Methamphetamine: denies  Nicotine: Stopped smoking cigarettes August 2021--had been using 1.5 packs/daily.  Began smoking at 16 y/o  Caffeine:    Social History:   Grew up in: [place]  Raised by: parents  Number of siblings: one sister  Education: HS grad  Employment: PT construction  Marital Status:  x 29 years  Children: 30yo son and a 26yo son  Living situation: lives in a house with his wife  [employer] affiliation:     Trauma History:  History of Emotional/Mental abuse: denies  History of Physical abuse: denies  History of Sexual abuse: denies  History of other trauma: being given a CA diagnosis and  told that he had 2months to 2 years to live    Legal History:  Legal history: denies  Denies being on probation or parole  Denies any upcoming court dates  Denies any pending charges.    PHQ Score:   03/24/2023: 5    DELIO-7 Score:   03/24/2023: 10    Mental Status Evaluation:  Appearance:  age appropriate, casually dressed, neatly groomed   Behavior:  normal, cooperative   Speech:  no latency; no press   Mood:  anxious, dysthymic   Affect:  mood-congruent   Thought Process:  normal and logical   Thought Content:  normal, no suicidality, no homicidality, delusions, or paranoia   Sensorium:  grossly intact   Cognition:  grossly intact   Insight:  intact   Judgment:  behavior is adequate to circumstances     Impression:  Anxiety/Separation Anxiety  2. MDD  3. ADHD - inattentive    Plan:  Clonidine 0.1mg at bedtime for both anxiety and inattention  2. Zoloft 25mg at bedtime x 2 weeks  and then increase to 50mg at bedtime for depression and anxiety  3. Continue 1:1 therapy   4. Follow-up in 6 weeks

## 2024-05-21 ENCOUNTER — OFFICE VISIT (OUTPATIENT)
Dept: URGENT CARE | Facility: CLINIC | Age: 49
End: 2024-05-21
Payer: MEDICAID

## 2024-05-21 ENCOUNTER — HOSPITAL ENCOUNTER (OUTPATIENT)
Dept: RADIOLOGY | Facility: HOSPITAL | Age: 49
Discharge: HOME | End: 2024-05-21
Attending: NURSE PRACTITIONER
Payer: MEDICAID

## 2024-05-21 VITALS
SYSTOLIC BLOOD PRESSURE: 126 MMHG | DIASTOLIC BLOOD PRESSURE: 86 MMHG | HEART RATE: 95 BPM | BODY MASS INDEX: 20.99 KG/M2 | WEIGHT: 126 LBS | RESPIRATION RATE: 18 BRPM | TEMPERATURE: 98 F | OXYGEN SATURATION: 99 % | HEIGHT: 65 IN

## 2024-05-21 DIAGNOSIS — L84 CALLUS OF HEEL: Primary | ICD-10-CM

## 2024-05-21 DIAGNOSIS — M79.672 LEFT FOOT PAIN: ICD-10-CM

## 2024-05-21 PROCEDURE — 73630 X-RAY EXAM OF FOOT: CPT | Mod: TC,LT

## 2024-05-21 PROCEDURE — 99215 OFFICE O/P EST HI 40 MIN: CPT | Mod: PBBFAC,25 | Performed by: NURSE PRACTITIONER

## 2024-05-21 PROCEDURE — 99213 OFFICE O/P EST LOW 20 MIN: CPT | Mod: S$PBB,,, | Performed by: NURSE PRACTITIONER

## 2024-05-21 RX ORDER — IBUPROFEN 800 MG/1
800 TABLET ORAL 3 TIMES DAILY
Qty: 15 TABLET | Refills: 0 | Status: SHIPPED | OUTPATIENT
Start: 2024-05-21 | End: 2024-05-26

## 2024-05-21 NOTE — PROGRESS NOTES
I Called to notify Mr. Pratt of Xray results.   Although there is no object seen on Xray to the soft tissue, there final results of your xray shows a small fragment of bone near the area of pain you are feeling in your foot  at the side of your foot near your heel. The age of this finding cannot be determined by the review of the xray. However, this does correlate with your 8 week reports of symptoms. Please follow up with PCP.   Spoke with Mr. Pratt, he verbalized understanding of f/u care needed with PCP. Pt shared that he did injure his ankle 1-2 years ago and this may be the result after all the time he was without pain. Pt reports he has made an appointment with his PCP and will see a podiatrist.

## 2024-05-21 NOTE — PATIENT INSTRUCTIONS
Roll the arch/sole of your foot on a frozen water bottle morning and evening.  Stretch the arch of your foot, your achilles, and your calf up against a wall or step, morning and evening.  Anti-inflammatory pain med was sent to your pharmacy, take this for up to 14 days.  Please read attached patient education materials for more information.  Special Plantar Faciitis braces and wraps can be purchased at [facility] in [place] and on [employer].  In 2-3 days  Instructed to follow with PCP in

## 2024-05-21 NOTE — PROGRESS NOTES
"  Subjective:      Patient ID: Deepak Pratt is a 49 y.o. male.    Vitals:  height is 5' 5" (1.651 m) and weight is 57.2 kg (126 lb). His oral temperature is 98.4 °F (36.9 °C). His blood pressure is 126/86 and his pulse is 95. His respiration is 18 and oxygen saturation is 99%.     Chief Complaint: Foot Pain (Patient reports "feeling something" in L foot x 2 months. Pt unaware if he stepped on something or not. )    Foot Pain    As stated in CC. Pt has left hoot/foot pain for 8 weeks. Pt denies any worsening of pain but states he suspected he may have stepped on something and wanted to try nad get it out.   ROS   Objective:     Physical Exam   Constitutional: He is oriented to person, place, and time. He appears well-developed.  Non-toxic appearance. He does not appear ill. No distress.   HENT:   Head: Atraumatic.   Nose: No purulent discharge. Right sinus exhibits no maxillary sinus tenderness and no frontal sinus tenderness. Left sinus exhibits no maxillary sinus tenderness and no frontal sinus tenderness.   Mouth/Throat: Uvula is midline.   Eyes: Right eye exhibits no discharge. Left eye exhibits no discharge. Extraocular movement intact   Neck: Neck supple. No neck rigidity present.   Cardiovascular: Regular rhythm.   Pulmonary/Chest: Effort normal and breath sounds normal. No respiratory distress. He has no wheezes. He has no rales.   Musculoskeletal: Normal range of motion.         General: No swelling, tenderness, deformity or signs of injury. Normal range of motion.      Right lower leg: No edema.      Left lower leg: No edema.      Left foot: Normal range of motion. No tenderness, bony tenderness, swelling, crepitus, deformity or laceration.      Comments: Hard callus to area of concern on feet of foot, there is no warmth, erythema or redness to area.   Lymphadenopathy:     He has no cervical adenopathy.   Neurological: He is alert and oriented to person, place, and time.   Skin: Skin is warm, dry and not " diaphoretic. Capillary refill takes less than 2 seconds. not left foot  Psychiatric: His behavior is normal.   Nursing note and vitals reviewed.      Assessment:     1. Callus of heel        Plan:   X-ray performed to rule out foreign body.  X-ray reviewed.  No suspected foreign body.  X-ray results pending, We will notify our abnormal results.  Discussed other etiologies of pain to heal of foot such as callus noted to the heel and plantar fasciitis instructed on nonpharmacological measures to help with foot pain  Prescribed Motrin as needed for pain  .  Follow with PCP  Callus of heel  -     XR FOOT COMPLETE 3 VIEW LEFT; Future; Expected date: 05/21/2024    Other orders  -     ibuprofen (ADVIL,MOTRIN) 800 MG tablet; Take 1 tablet (800 mg total) by mouth 3 (three) times daily. for 5 days  Dispense: 15 tablet; Refill: 0          Medical Decision Making:   Differential Diagnosis:   Plantar fascitis

## 2024-05-28 ENCOUNTER — TELEPHONE (OUTPATIENT)
Dept: HEMATOLOGY/ONCOLOGY | Facility: CLINIC | Age: 49
End: 2024-05-28
Payer: MEDICAID

## 2024-05-28 DIAGNOSIS — C34.91 SQUAMOUS CELL CARCINOMA OF RIGHT LUNG: ICD-10-CM

## 2024-05-28 DIAGNOSIS — J90 EXUDATIVE PLEURAL EFFUSION: Primary | ICD-10-CM

## 2024-05-29 ENCOUNTER — OFFICE VISIT (OUTPATIENT)
Dept: HEMATOLOGY/ONCOLOGY | Facility: CLINIC | Age: 49
End: 2024-05-29
Attending: INTERNAL MEDICINE
Payer: MEDICAID

## 2024-05-29 ENCOUNTER — LAB VISIT (OUTPATIENT)
Dept: LAB | Facility: HOSPITAL | Age: 49
End: 2024-05-29
Attending: INTERNAL MEDICINE
Payer: MEDICAID

## 2024-05-29 VITALS
WEIGHT: 126 LBS | RESPIRATION RATE: 18 BRPM | OXYGEN SATURATION: 100 % | SYSTOLIC BLOOD PRESSURE: 134 MMHG | HEIGHT: 65 IN | HEART RATE: 95 BPM | BODY MASS INDEX: 20.99 KG/M2 | DIASTOLIC BLOOD PRESSURE: 84 MMHG | TEMPERATURE: 99 F

## 2024-05-29 DIAGNOSIS — E78.5 HYPERLIPIDEMIA, UNSPECIFIED HYPERLIPIDEMIA TYPE: ICD-10-CM

## 2024-05-29 DIAGNOSIS — Z85.118 ENCOUNTER FOR FOLLOW-UP SURVEILLANCE OF LUNG CANCER: ICD-10-CM

## 2024-05-29 DIAGNOSIS — C34.91 ADENOCARCINOMA OF RIGHT LUNG, STAGE 3: ICD-10-CM

## 2024-05-29 DIAGNOSIS — C34.91 SQUAMOUS CELL CARCINOMA OF LUNG, STAGE III, RIGHT: ICD-10-CM

## 2024-05-29 DIAGNOSIS — C34.91 SQUAMOUS CELL CARCINOMA OF RIGHT LUNG: Chronic | ICD-10-CM

## 2024-05-29 DIAGNOSIS — E53.8 FOLATE DEFICIENCY: ICD-10-CM

## 2024-05-29 DIAGNOSIS — Z72.0 TOBACCO USER: Chronic | ICD-10-CM

## 2024-05-29 DIAGNOSIS — J90 EXUDATIVE PLEURAL EFFUSION: Primary | Chronic | ICD-10-CM

## 2024-05-29 DIAGNOSIS — J90 RECURRENT RIGHT PLEURAL EFFUSION: Chronic | ICD-10-CM

## 2024-05-29 DIAGNOSIS — C34.90 SQUAMOUS CELL CARCINOMA OF LUNG, UNSPECIFIED LATERALITY: Chronic | ICD-10-CM

## 2024-05-29 DIAGNOSIS — Z92.21 STATUS POST CHEMORADIATION: ICD-10-CM

## 2024-05-29 DIAGNOSIS — D63.8 ANEMIA OF CHRONIC DISEASE: Chronic | ICD-10-CM

## 2024-05-29 DIAGNOSIS — Z08 ENCOUNTER FOR FOLLOW-UP SURVEILLANCE OF LUNG CANCER: ICD-10-CM

## 2024-05-29 DIAGNOSIS — E53.8 FOLATE DEFICIENCY: Primary | ICD-10-CM

## 2024-05-29 DIAGNOSIS — J90 EXUDATIVE PLEURAL EFFUSION: ICD-10-CM

## 2024-05-29 DIAGNOSIS — D75.839 THROMBOCYTOSIS: Chronic | ICD-10-CM

## 2024-05-29 DIAGNOSIS — Z92.3 STATUS POST CHEMORADIATION: ICD-10-CM

## 2024-05-29 DIAGNOSIS — C34.91 SQUAMOUS CELL CARCINOMA OF RIGHT LUNG: ICD-10-CM

## 2024-05-29 DIAGNOSIS — C34.91 SQUAMOUS CELL CARCINOMA OF LUNG, STAGE III, RIGHT: Primary | ICD-10-CM

## 2024-05-29 DIAGNOSIS — E78.00 PURE HYPERCHOLESTEROLEMIA: Chronic | ICD-10-CM

## 2024-05-29 DIAGNOSIS — E22.2 SIADH (SYNDROME OF INAPPROPRIATE ADH PRODUCTION): Chronic | ICD-10-CM

## 2024-05-29 DIAGNOSIS — D50.8 OTHER IRON DEFICIENCY ANEMIA: Chronic | ICD-10-CM

## 2024-05-29 DIAGNOSIS — J90 PLEURAL EFFUSION ON RIGHT: ICD-10-CM

## 2024-05-29 DIAGNOSIS — E83.52 HYPERCALCEMIA: Chronic | ICD-10-CM

## 2024-05-29 DIAGNOSIS — Z79.899 LONG TERM USE OF DRUG: ICD-10-CM

## 2024-05-29 LAB
ALBUMIN SERPL-MCNC: 3.7 G/DL (ref 3.5–5)
ALBUMIN/GLOB SERPL: 1 RATIO (ref 1.1–2)
ALP SERPL-CCNC: 57 UNIT/L (ref 40–150)
ALT SERPL-CCNC: 5 UNIT/L (ref 0–55)
ANION GAP SERPL CALC-SCNC: 6 MEQ/L
AST SERPL-CCNC: 12 UNIT/L (ref 5–34)
BASOPHILS # BLD AUTO: 0.05 X10(3)/MCL
BASOPHILS NFR BLD AUTO: 0.7 %
BILIRUB SERPL-MCNC: 0.4 MG/DL
BUN SERPL-MCNC: 12.6 MG/DL (ref 8.4–25.7)
CALCIUM SERPL-MCNC: 9.6 MG/DL (ref 8.4–10.2)
CHLORIDE SERPL-SCNC: 103 MMOL/L (ref 98–107)
CO2 SERPL-SCNC: 28 MMOL/L (ref 22–29)
CREAT SERPL-MCNC: 0.96 MG/DL (ref 0.73–1.18)
CREAT/UREA NIT SERPL: 13
EOSINOPHIL # BLD AUTO: 0.07 X10(3)/MCL (ref 0–0.9)
EOSINOPHIL NFR BLD AUTO: 1 %
ERYTHROCYTE [DISTWIDTH] IN BLOOD BY AUTOMATED COUNT: 13.6 % (ref 11.5–17)
FERRITIN SERPL-MCNC: 187.25 NG/ML (ref 21.81–274.66)
FOLATE SERPL-MCNC: 4.4 NG/ML (ref 7–31.4)
GFR SERPLBLD CREATININE-BSD FMLA CKD-EPI: >60 ML/MIN/1.73/M2
GLOBULIN SER-MCNC: 3.8 GM/DL (ref 2.4–3.5)
GLUCOSE SERPL-MCNC: 92 MG/DL (ref 74–100)
HCT VFR BLD AUTO: 43.4 % (ref 42–52)
HGB BLD-MCNC: 14.5 G/DL (ref 14–18)
IMM GRANULOCYTES # BLD AUTO: 0.02 X10(3)/MCL (ref 0–0.04)
IMM GRANULOCYTES NFR BLD AUTO: 0.3 %
IRON SATN MFR SERPL: 18 % (ref 20–50)
IRON SERPL-MCNC: 55 UG/DL (ref 65–175)
LDH SERPL-CCNC: 145 U/L (ref 125–220)
LYMPHOCYTES # BLD AUTO: 0.94 X10(3)/MCL (ref 0.6–4.6)
LYMPHOCYTES NFR BLD AUTO: 13.5 %
MCH RBC QN AUTO: 29.8 PG (ref 27–31)
MCHC RBC AUTO-ENTMCNC: 33.4 G/DL (ref 33–36)
MCV RBC AUTO: 89.3 FL (ref 80–94)
MONOCYTES # BLD AUTO: 0.49 X10(3)/MCL (ref 0.1–1.3)
MONOCYTES NFR BLD AUTO: 7.1 %
NEUTROPHILS # BLD AUTO: 5.38 X10(3)/MCL (ref 2.1–9.2)
NEUTROPHILS NFR BLD AUTO: 77.4 %
NRBC BLD AUTO-RTO: 0 %
PLATELET # BLD AUTO: 207 X10(3)/MCL (ref 130–400)
PMV BLD AUTO: 9.5 FL (ref 7.4–10.4)
POTASSIUM SERPL-SCNC: 4.7 MMOL/L (ref 3.5–5.1)
PROT SERPL-MCNC: 7.5 GM/DL (ref 6.4–8.3)
RBC # BLD AUTO: 4.86 X10(6)/MCL (ref 4.7–6.1)
SODIUM SERPL-SCNC: 137 MMOL/L (ref 136–145)
TIBC SERPL-MCNC: 252 UG/DL (ref 69–240)
TIBC SERPL-MCNC: 307 UG/DL (ref 250–450)
TRANSFERRIN SERPL-MCNC: 284 MG/DL (ref 174–364)
VIT B12 SERPL-MCNC: 753 PG/ML (ref 213–816)
WBC # SPEC AUTO: 6.95 X10(3)/MCL (ref 4.5–11.5)

## 2024-05-29 PROCEDURE — 3008F BODY MASS INDEX DOCD: CPT | Mod: CPTII,,, | Performed by: INTERNAL MEDICINE

## 2024-05-29 PROCEDURE — 82607 VITAMIN B-12: CPT

## 2024-05-29 PROCEDURE — 36415 COLL VENOUS BLD VENIPUNCTURE: CPT

## 2024-05-29 PROCEDURE — 83615 LACTATE (LD) (LDH) ENZYME: CPT

## 2024-05-29 PROCEDURE — 99214 OFFICE O/P EST MOD 30 MIN: CPT | Mod: S$PBB,,, | Performed by: INTERNAL MEDICINE

## 2024-05-29 PROCEDURE — 3079F DIAST BP 80-89 MM HG: CPT | Mod: CPTII,,, | Performed by: INTERNAL MEDICINE

## 2024-05-29 PROCEDURE — 83540 ASSAY OF IRON: CPT

## 2024-05-29 PROCEDURE — 82746 ASSAY OF FOLIC ACID SERUM: CPT

## 2024-05-29 PROCEDURE — 80053 COMPREHEN METABOLIC PANEL: CPT

## 2024-05-29 PROCEDURE — 99213 OFFICE O/P EST LOW 20 MIN: CPT | Mod: PBBFAC | Performed by: INTERNAL MEDICINE

## 2024-05-29 PROCEDURE — 85025 COMPLETE CBC W/AUTO DIFF WBC: CPT

## 2024-05-29 PROCEDURE — 1160F RVW MEDS BY RX/DR IN RCRD: CPT | Mod: CPTII,,, | Performed by: INTERNAL MEDICINE

## 2024-05-29 PROCEDURE — 1159F MED LIST DOCD IN RCRD: CPT | Mod: CPTII,,, | Performed by: INTERNAL MEDICINE

## 2024-05-29 PROCEDURE — 82728 ASSAY OF FERRITIN: CPT

## 2024-05-29 PROCEDURE — 3075F SYST BP GE 130 - 139MM HG: CPT | Mod: CPTII,,, | Performed by: INTERNAL MEDICINE

## 2024-05-29 RX ORDER — FOLIC ACID 1 MG/1
1 TABLET ORAL DAILY
Qty: 30 TABLET | Refills: 3 | Status: SHIPPED | OUTPATIENT
Start: 2024-05-29 | End: 2025-05-29

## 2024-05-29 NOTE — PROGRESS NOTES
History:  Past Medical History:   Diagnosis Date    Anemia of chronic disease 5/15/2022    Attention deficit hyperactivity disorder (ADHD), predominantly inattentive type 7/15/2022    Cachexia 7/15/2022    Depression with anxiety 7/15/2022    Hypercalcemia 5/15/2022    Iron deficiency anemia 5/15/2022    Lung cancer     Pleural effusion on right 7/9/2022    Pure hypercholesterolemia 10/27/2022    SIADH (syndrome of inappropriate ADH production) 5/15/2022    Squamous cell carcinoma of lung 3/21/2022    Thrombocytosis 10/16/2023    Tobacco user 7/15/2022   Past medical history: Attention deficit disorder. Tobacco abuse. Tonsillectomy.  Social history: .  Lives in [place], [place].  Has 2 children.  Has worked as a  all his life (more than 30-35 years).  Smoked 1-1/2 packs of cigarettes daily for 15 years; then, 1 pack daily for 1 year; now, for last 2 months, 2 cigarettes daily.  Heavy alcohol use in the past; 8-10 beers daily for 10 years; quit 1-1/2 years ago.  No illicit drugs.  Family history: No family members with cancer.  Health maintenance: So far, does not have a primary care physician.  Apparently, he is going to see a primary care physician for the first time next week.  Past Surgical History:   Procedure Laterality Date    Bronchoscopy w endobronchial biopsy  08/18/2021    COLONOSCOPY  02/05/2024    Endobronchial ablation of left main stem occlusion  08/27/2021    MEDIPORT INSERTION, SINGLE  10/11/2021    TONSILLECTOMY  1981    US guided right thoracentesis Right 04/22/2022      Social History     Socioeconomic History    Marital status:      Spouse name: Veronica Pratt    Number of children: 2   Occupational History    Occupation:    Tobacco Use    Smoking status: Former     Current packs/day: 0.00     Average packs/day: 1.5 packs/day for 31.0 years (46.5 ttl pk-yrs)     Types: Cigarettes     Start date: 1/1/1990     Quit date: 1/1/2021     Years since  quitting: 3.4    Smokeless tobacco: Never   Substance and Sexual Activity    Alcohol use: Yes    Drug use: Never    Sexual activity: Yes      Family History   Problem Relation Name Age of Onset    ALS Mother  40    Nephrolithiasis Father      Cholecystitis Father      Psychosis Sister      Diabetes type II Son        Reason for Follow-up:  -Squamous cell carcinoma of lung, stage III  -right-sided exudative pleural effusion (S/P thoracentesis 02/15/2023)  -Microcytic anemia (relative iron deficiency)  -Hypercalcemia of malignancy  -Thrombocytosis  -Hyponatremia (SIADH)       History of Present Illness:   Squamous cell carcinoma of right lung        Oncologic/Hematologic History:  Oncology History   Squamous cell carcinoma of right lung   1/25/2022 - 12/6/2022 Chemotherapy    Treatment Summary   Plan Name: OP DURVALUMAB 1500 MG Q4W  Treatment Goal: Control  Status: Inactive  Start Date: 1/25/2022  End Date: 12/6/2022  Provider: Brendan Amaral MD  Chemotherapy: [No matching medication found in this treatment plan]     3/21/2022 Initial Diagnosis    Squamous cell carcinoma of lung     5/15/2022 Cancer Staged    Staging form: Lung, AJCC 8th Edition  - Clinical stage from 5/15/2022: Stage IIIB (cT4, cN2, cM0)     Patient is being followed for history of squamous cell carcinoma of lung, stage IIIB or stage IIIC, treated with chemoradiation therapy, followed by consolidation durvalumab.      Please see assessment and plan section for details.     09/20/2021:  Presents for initial medical oncology consultation    Interval History:  PORT FLUSH   [No matching plan found]   05/29/2024:   -05/06/2024:  Surveillance noncontrast chest CT:   1. No detrimental change from previous exam  2. Post treatment change with loss of normal fat planes at the right hilum and subcarinal region, similar narrowing of the right mainstem bronchus and similar perihilar peribronchovascular opacities.  Further characterization limited without  contrast.  -05/29/2024:  Labs reviewed; CBC normal; hemoglobin 14.5; serum iron 55, low; TIBC normal; ferritin 187.25, normal; folate 4.4, low; B12 level 753, normal; transferrin saturation 18%, low; CMP essentially unremarkable; LDH normal  Presents for a follow-up visit.  Doing very well.  No complaints.  Great appetite.  He discontinued smoking when he was diagnosed with cancer.  No chest pain, cough, dyspnea, hemoptysis, recurrent fevers or chills, any new lumps or lymphadenopathy, weakness, fatigue, anorexia, unintentional weight loss, unusual headaches, or focal neurological symptoms.  No abdominal pain, nausea, vomiting, change in bowel habits, or GI bleeding.  No bone pains.  ECOG 0.  Despite good appetite and eating well, folic acid deficiency is noted.  We will prescribe folic acid 1 mg p.o. q.day.    Medications:  Current Outpatient Medications on File Prior to Visit   Medication Sig Dispense Refill    cloNIDine (CATAPRES) 0.1 MG tablet Take 1 tablet (0.1 mg total) by mouth nightly. 90 tablet 1    dextroamphetamine-amphetamine (ADDERALL) 10 mg Tab Take 1 tablet (10 mg total) by mouth 2 (two) times a day. 60 tablet 0    EPINEPHrine (EPIPEN 2-SUSANA) 0.3 mg/0.3 mL AtIn Inject 0.3 mLs (0.3 mg total) into the muscle 1 (one) time if needed (allergic reaction). 1 each 1    ibuprofen (ADVIL,MOTRIN) 600 MG tablet Take 600 mg by mouth every 6 (six) hours as needed.      predniSONE (DELTASONE) 10 MG tablet TAKE ONE TABLET BY MOUTH EVERY DAY WITH FOOD AS NEEDED FOR WASP STING 10 tablet 4    sertraline (ZOLOFT) 100 MG tablet Take 1 tablet (100 mg total) by mouth once daily. 90 tablet 1     No current facility-administered medications on file prior to visit.     Review of Systems:   All systems reviewed and found to be negative except for the symptoms detailed above    Physical Examination:   VITAL SIGNS:   Vitals:    05/29/24 1340   BP: 134/84   Pulse: 95   Resp: 18   Temp: 98.5 °F (36.9 °C)     GENERAL:  In no apparent  "distress.    HEAD:  No signs of head trauma.  EYES:  Pupils are equal.  Extraocular motions intact.    EARS:  Hearing grossly intact.  MOUTH:  Oropharynx is normal.   NECK:  No adenopathy, no JVD.     CHEST:  Chest with clear breath sounds bilaterally.  No wheezes, rales, rhonchi.    CARDIAC:  Regular rate and rhythm.  S1 and S2, without murmurs, gallops, rubs.  VASCULAR:  No Edema.  Peripheral pulses normal and equal in all extremities.  ABDOMEN:  Soft, without detectable tenderness.  No sign of distention.  No   rebound or guarding, and no masses palpated.   Bowel Sounds normal.  MUSCULOSKELETAL:  Good range of motion of all major joints. Extremities without clubbing, cyanosis or edema.    NEUROLOGIC EXAM:  Alert and oriented x 3.  No focal sensory or strength deficits.   Speech normal.  Follows commands.  PSYCHIATRIC:  Mood normal.    No results for input(s): "CBC" in the last 72 hours.   No results for input(s): "CMP" in the last 72 hours.     Assessment:  Problem List Items Addressed This Visit          Pulmonary    Exudative pleural effusion - Primary (Chronic)    Recurrent right pleural effusion (Chronic)       Renal/    Hypercalcemia (Chronic)       Oncology    Squamous cell carcinoma of right lung (Chronic)    Iron deficiency anemia (Chronic)    Thrombocytosis (Chronic)    RESOLVED: Anemia of chronic disease (Chronic)    Squamous cell carcinoma of lung, stage III, right    Status post chemoradiation    RESOLVED: Encounter for follow-up surveillance of lung cancer    RESOLVED: Adenocarcinoma of right lung, stage 3       Endocrine    SIADH (syndrome of inappropriate ADH production) (Chronic)    Folate deficiency       Other    RESOLVED: Tobacco user (Chronic)       Presented with locally advanced, obviously inoperable squamous cell carcinoma of lung;  large, 10 cm mediastinal mass, possibly arising in the right lower lung lobe, causing compressive effects on right mainstem bronchus and right pulmonary " artery;  no metastases;  s/p bronchoscopic biopsy of carinal mass 08/18/2021;  large fungating mass at maximiliano, obliterating the right mainstem bronchus and partially occluding left mainstem;  causing hyponatremia, hypercalcemia;  worsening dyspnea, fever, postobstructive pneumonia     Squamous cell carcinoma of right lung:  -bronchoscopy 08/18/2021  -10 cm subcarinal mass with compressive effects, right lower lung lobe large masslike consolidation with central cavitation, large fungating mass at maximiliano, obliterating the right mainstem bronchus and partially occluding left mainstem  -cT4 cN2 M0, stage IIIB  -postobstructive pneumonia and hospitalization  -S/P laser tumor debulking at [facility] 08/27/2021  -hyponatremia secondary to SIADH  -hypercalcemia  -S/P palliative radiotherapy 09/2021, pending definitive concurrent chemoradiation therapy, with subjective and objective improvement  -Followed by sequential chemotherapy with cisplatin/docetaxel q3 weeks x4 cycles (10/2021-12/2021)  -considerable improvement  -followed by consolidation durvalumab every 4 weeks X 12 cycles (01/25/2022-12/06/2022)   -no recurrence or metastases on surveillance CT chest without contrast 02/06/2023  -slightly larger right pleural effusion on surveillance chest CT without contrast 02/06/2023  -development of axitinib pleural effusion 02/15/2023, 700 cc removed; cytology never sent for  -CHRIS on chest CT 05/09/2023, 08/16/2023  -08/28/2023:  Patient canceled thoracentesis with IR  -CHRIS on CT chest without contrast 11/20/2023  -02/05/2024: Colonoscopy (Dr. Galen Macias, GI):  Multiple diverticula seen in the sigmoid colon and descending colon; repeat colonoscopy in 10 years  -02/06/2024:  Surveillance CT chest without contrast:  No significant interval change from previous exam; posttreatment changes; no recurrence or metastases  -surveillance noncontrast chest CT 05/06/2024:  No recurrence or progression      Molecular markers:  -EGFR  mutation negative; PD-L1 negative (TPS 0%); KRAS mutation negative;  -ALK gene rearrangement negative; ROS1 gene rearrangement negative; NTRK NGS fusion panel negative  -BRAF mutation negative; RET gene rearrangement not detected         Microcytic anemia (relative iron deficiency +/- anemia of chronic disease/malignancy):  -Hemoglobin 6.8-9.3  -MCV 79.7  -MCH, MCHC low  -Stool for occult blood negative x2 (09/2021)  -B12 normal, 755 (09/06/2021)  -Serum iron 9, TIBC 157, transferrin saturation 6%, ferritin 795.5 (09/06/2021)  -Folate low, 6.5 (09/06/2021  -09/30/2021: Serum iron 24, TIBC 220, transferrin saturation 11%, ferritin 714.65 (anemia of chronic disease/relative iron deficiency); B12 level 1046; RBC folate 1147; hemoglobin 10.5  -05/29/2024:  Labs reviewed; CBC normal; hemoglobin 14.5; serum iron 55, low; TIBC normal; ferritin 187.25, normal; folate 4.4, low; B12 level 753, normal; transferrin saturation 18%, low; CMP essentially unremarkable; LDH normal         Hypercalcemia of malignancy:  -Calcium level:  10.3 on 08/06/2021 (albumin 2.5), etc.  -08/07/2021: Intact PTH level normal, 12.7; PTH related peptide <2.0  -09/10/2021: PTH related peptide <2.0  -Zometa 4 mg IV x1 (09/12/2021) >> hypercalcemia resolved   -09/30/2021: Ionized calcium level 5.1, normal (Serum calcium 9.3, albumin 2.6, corrected calcium 10.42)         Thrombocytosis:   (Subsequently, spontaneously resolved)  -Platelet count 413-588K  -Almost certainly, secondary to underlying malignancy and relative iron deficiency  -09/30/2021: ELDON-2 mutation negative  -10/08/2021: BCR-ABL1 1 fusion transcripts negative         Hyponatremia secondary to SIADH:  -Sodium 128-130  -Treated with fluid restriction, salt tablets (by nephrology)        Plan:  Repeat contrast-enhanced CT scan of chest for surveillance in August   Repeat screening colonoscopy in 2044  Start folic acid 1 mg p.o. q.day for folic acid deficiency  Follow-up in August with CT  chest with contrast, CBC, CMP, and folic acid level  ------------------------------------      Squamous cell carcinoma of right lung, cT4 cN2 M0, stage IIIB  Palliative radiotherapy 09/2021  Followed by sequential chemotherapy with cisplatin/docetaxel X 4 cycles (10/2021-12/2021)   Followed by consolidation durvalumab 01/2022-12/2022  >>>  Continue surveillance for stage III non-small cell lung cancer, treated with chemoradiation therapy  -Completed radiotherapy, followed by sequential chemotherapy 12/2021   -followed by consolidation durvalumab 01/2022-12/2022  -History and physical and chest CT +/- contrast every 3 months for 3 years (12/2021-12/2024), then every 6 months for 2 years (12/2024-12/2026), then history and physical and low-dose noncontrast enhanced chest CT annually  >>>  -CHRIS on surveillance noncontrast chest CT 02/06/2023  -exudative right-sided pleural effusion, 700 cc (02/2023)  -CHRIS on surveillance CT chest with contrast 05/09/2023, 08/16/2023  -08/28/2023:  Patient canceled thoracentesis with IR  -CHRIS on CT chest without contrast 11/20/2023  -02/05/2024: Colonoscopy (Dr. Galen Macias, GI):  Multiple diverticula seen in the sigmoid colon and descending colon; repeat colonoscopy in 10 years  -02/06/2024:  Surveillance CT chest without contrast:  No significant interval change from previous exam; posttreatment changes; no recurrence or metastases  -surveillance noncontrast chest CT 05/06/2024:  No recurrence or progression   >>>  Repeat contrast-enhanced chest CT for surveillance in 3 months (August)  -repeat screening colonoscopy in 10 years (in 2034)    -Microcytic anemia  -Anemia of chronic disease/relative iron deficiency  -not an issue at this time    -03/03/2023: Anxiety and mood swings; no suicidal or homicidal ideation; no psychotic symptoms;   follow-up with behavioral health  -02/21/2024:  As of now, stable     Hypercalcemia of malignancy  -S/p Zometa 4 mg IV x1  (09/12/2021)    Thrombocytosis; reactive; subsequently, spontaneously resolved     Hyponatremia secondary to SIADH secondary to malignancy  -Managed by renal; managed with fluid restriction  -resolved    Follow-up in August with CT chest with contrast, CBC, CMP, and folic acid level     Above discussed with him.  All questions answered.  Discussed labs and scans and gave him copies of relevant report.  He understands and agrees with this plan.      Follow-up:  No follow-ups on file.

## 2024-05-29 NOTE — Clinical Note
Repeat contrast-enhanced CT scan of chest for surveillance in August  Repeat screening colonoscopy in 2024 Start folic acid 1 mg p.o. q.day for folic acid deficiency Follow-up in August with CT chest with contrast, CBC, CMP, and folic acid level

## 2024-06-10 ENCOUNTER — TELEPHONE (OUTPATIENT)
Dept: BEHAVIORAL HEALTH | Facility: CLINIC | Age: 49
End: 2024-06-10
Payer: MEDICAID

## 2024-06-10 RX ORDER — DEXTROAMPHETAMINE SACCHARATE, AMPHETAMINE ASPARTATE, DEXTROAMPHETAMINE SULFATE AND AMPHETAMINE SULFATE 5; 5; 5; 5 MG/1; MG/1; MG/1; MG/1
TABLET ORAL
Qty: 60 TABLET | Refills: 0 | Status: SHIPPED | OUTPATIENT
Start: 2024-06-10 | End: 2024-07-10

## 2024-06-10 NOTE — TELEPHONE ENCOUNTER
In-between visit  06/10/2024    Spoke to patient, he stated he wanted you to know that the Adderall 10 mg were not working so he doubled it up and is taking 20 mg and its working just fine. Please increase Adderall to 20 mg.  Thank you      Called and spoke with patient. He states that he was doubled both the morning and the afternoon doses of Adderall and feels that this dose is more effective. Will increase Adderall to 20mg every morning and 20mg daily after lunch.     Keep FU appointment.

## 2024-06-10 NOTE — TELEPHONE ENCOUNTER
----- Message from Jamila Slaughter sent at 6/10/2024 10:12 AM CDT -----  Regarding: Med refill  [phone] pt requesting a return call from the nurse regarding his rx of dextroamphetamine-amphetamine (ADDERALL) 10 mg Tab

## 2024-07-18 ENCOUNTER — PATIENT MESSAGE (OUTPATIENT)
Dept: BEHAVIORAL HEALTH | Facility: CLINIC | Age: 49
End: 2024-07-18
Payer: MEDICAID

## 2024-07-23 RX ORDER — DEXTROAMPHETAMINE SACCHARATE, AMPHETAMINE ASPARTATE, DEXTROAMPHETAMINE SULFATE AND AMPHETAMINE SULFATE 5; 5; 5; 5 MG/1; MG/1; MG/1; MG/1
TABLET ORAL
Qty: 60 TABLET | Refills: 0 | Status: SHIPPED | OUTPATIENT
Start: 2024-07-23 | End: 2024-08-21

## 2024-07-23 NOTE — TELEPHONE ENCOUNTER
Please see attached refill request.    Next scheduled office visit: 9/4/2024.    Last office visit: 5/14/2024.     Thank you!

## 2024-08-20 DIAGNOSIS — F90.9 ADULT ADHD: ICD-10-CM

## 2024-08-20 DIAGNOSIS — F41.8 DEPRESSION WITH ANXIETY: Chronic | ICD-10-CM

## 2024-08-20 DIAGNOSIS — E53.8 FOLATE DEFICIENCY: ICD-10-CM

## 2024-08-20 DIAGNOSIS — F41.9 ANXIETY: ICD-10-CM

## 2024-08-20 RX ORDER — CLONIDINE HYDROCHLORIDE 0.1 MG/1
0.1 TABLET ORAL NIGHTLY
Qty: 90 TABLET | Refills: 1 | Status: SHIPPED | OUTPATIENT
Start: 2024-08-20

## 2024-08-20 RX ORDER — FOLIC ACID 1 MG/1
1 TABLET ORAL DAILY
Qty: 30 TABLET | Refills: 3 | Status: SHIPPED | OUTPATIENT
Start: 2024-08-20 | End: 2025-08-20

## 2024-08-21 RX ORDER — SERTRALINE HYDROCHLORIDE 100 MG/1
100 TABLET, FILM COATED ORAL DAILY
Qty: 90 TABLET | Refills: 1 | Status: SHIPPED | OUTPATIENT
Start: 2024-08-21

## 2024-08-21 NOTE — TELEPHONE ENCOUNTER
Please see attached refill request.    Next scheduled office visit: 8/20/2024.    Last office visit: 5/14/2024.     Thank you!

## 2024-08-22 ENCOUNTER — HOSPITAL ENCOUNTER (OUTPATIENT)
Dept: RADIOLOGY | Facility: HOSPITAL | Age: 49
Discharge: HOME | End: 2024-08-22
Attending: INTERNAL MEDICINE
Payer: COMMERCIAL

## 2024-08-22 DIAGNOSIS — C34.91 SQUAMOUS CELL CARCINOMA OF LUNG, STAGE III, RIGHT: ICD-10-CM

## 2024-08-22 PROCEDURE — 71260 CT THORAX DX C+: CPT | Mod: TC

## 2024-08-22 PROCEDURE — 25500020 PHARM REV CODE 255

## 2024-08-22 RX ORDER — DEXTROAMPHETAMINE SACCHARATE, AMPHETAMINE ASPARTATE, DEXTROAMPHETAMINE SULFATE AND AMPHETAMINE SULFATE 5; 5; 5; 5 MG/1; MG/1; MG/1; MG/1
TABLET ORAL
Qty: 60 TABLET | Refills: 0 | Status: SHIPPED | OUTPATIENT
Start: 2024-08-22 | End: 2024-09-20

## 2024-08-22 RX ADMIN — IOHEXOL 100 ML: 350 INJECTION, SOLUTION INTRAVENOUS at 07:08

## 2024-08-28 ENCOUNTER — TELEPHONE (OUTPATIENT)
Dept: HEMATOLOGY/ONCOLOGY | Facility: CLINIC | Age: 49
End: 2024-08-28
Payer: COMMERCIAL

## 2024-08-29 ENCOUNTER — INFUSION (OUTPATIENT)
Dept: INFUSION THERAPY | Facility: HOSPITAL | Age: 49
End: 2024-08-29
Attending: INTERNAL MEDICINE
Payer: COMMERCIAL

## 2024-08-29 ENCOUNTER — OFFICE VISIT (OUTPATIENT)
Dept: HEMATOLOGY/ONCOLOGY | Facility: CLINIC | Age: 49
End: 2024-08-29
Attending: INTERNAL MEDICINE
Payer: COMMERCIAL

## 2024-08-29 VITALS
HEIGHT: 65 IN | WEIGHT: 126.63 LBS | BODY MASS INDEX: 21.1 KG/M2 | DIASTOLIC BLOOD PRESSURE: 76 MMHG | HEART RATE: 108 BPM | TEMPERATURE: 98 F | DIASTOLIC BLOOD PRESSURE: 72 MMHG | RESPIRATION RATE: 18 BRPM | RESPIRATION RATE: 18 BRPM | TEMPERATURE: 99 F | OXYGEN SATURATION: 98 % | HEART RATE: 113 BPM | SYSTOLIC BLOOD PRESSURE: 115 MMHG | SYSTOLIC BLOOD PRESSURE: 114 MMHG | OXYGEN SATURATION: 97 %

## 2024-08-29 DIAGNOSIS — C34.90 SQUAMOUS CELL CARCINOMA OF LUNG, UNSPECIFIED LATERALITY: Primary | ICD-10-CM

## 2024-08-29 DIAGNOSIS — D50.8 OTHER IRON DEFICIENCY ANEMIA: Chronic | ICD-10-CM

## 2024-08-29 DIAGNOSIS — Z08 ENCOUNTER FOR FOLLOW-UP SURVEILLANCE OF LUNG CANCER: ICD-10-CM

## 2024-08-29 DIAGNOSIS — J90 RECURRENT RIGHT PLEURAL EFFUSION: ICD-10-CM

## 2024-08-29 DIAGNOSIS — Z85.118 ENCOUNTER FOR FOLLOW-UP SURVEILLANCE OF LUNG CANCER: ICD-10-CM

## 2024-08-29 DIAGNOSIS — C34.91 SQUAMOUS CELL CARCINOMA OF LUNG, STAGE III, RIGHT: Primary | ICD-10-CM

## 2024-08-29 DIAGNOSIS — C34.91 SQUAMOUS CELL CARCINOMA OF RIGHT LUNG: Chronic | ICD-10-CM

## 2024-08-29 DIAGNOSIS — Z92.21 STATUS POST CHEMORADIATION: ICD-10-CM

## 2024-08-29 DIAGNOSIS — J90 EXUDATIVE PLEURAL EFFUSION: ICD-10-CM

## 2024-08-29 DIAGNOSIS — C34.91 SQUAMOUS CELL CARCINOMA OF LUNG, STAGE III, RIGHT: ICD-10-CM

## 2024-08-29 DIAGNOSIS — Z72.0 TOBACCO USER: ICD-10-CM

## 2024-08-29 DIAGNOSIS — E22.2 SIADH (SYNDROME OF INAPPROPRIATE ADH PRODUCTION): Chronic | ICD-10-CM

## 2024-08-29 DIAGNOSIS — C34.91 SQUAMOUS CELL CARCINOMA OF RIGHT LUNG: Primary | ICD-10-CM

## 2024-08-29 DIAGNOSIS — D63.8 ANEMIA OF CHRONIC DISEASE: ICD-10-CM

## 2024-08-29 DIAGNOSIS — E53.8 FOLATE DEFICIENCY: ICD-10-CM

## 2024-08-29 DIAGNOSIS — E83.52 HYPERCALCEMIA: ICD-10-CM

## 2024-08-29 DIAGNOSIS — Z92.3 STATUS POST CHEMORADIATION: ICD-10-CM

## 2024-08-29 PROCEDURE — 3074F SYST BP LT 130 MM HG: CPT | Mod: CPTII,,, | Performed by: INTERNAL MEDICINE

## 2024-08-29 PROCEDURE — 25000003 PHARM REV CODE 250: Performed by: INTERNAL MEDICINE

## 2024-08-29 PROCEDURE — 99214 OFFICE O/P EST MOD 30 MIN: CPT | Mod: PBBFAC,25 | Performed by: INTERNAL MEDICINE

## 2024-08-29 PROCEDURE — 96523 IRRIG DRUG DELIVERY DEVICE: CPT

## 2024-08-29 PROCEDURE — 1160F RVW MEDS BY RX/DR IN RCRD: CPT | Mod: CPTII,,, | Performed by: INTERNAL MEDICINE

## 2024-08-29 PROCEDURE — 3008F BODY MASS INDEX DOCD: CPT | Mod: CPTII,,, | Performed by: INTERNAL MEDICINE

## 2024-08-29 PROCEDURE — 3078F DIAST BP <80 MM HG: CPT | Mod: CPTII,,, | Performed by: INTERNAL MEDICINE

## 2024-08-29 PROCEDURE — 1159F MED LIST DOCD IN RCRD: CPT | Mod: CPTII,,, | Performed by: INTERNAL MEDICINE

## 2024-08-29 PROCEDURE — 63600175 PHARM REV CODE 636 W HCPCS: Performed by: INTERNAL MEDICINE

## 2024-08-29 PROCEDURE — 99214 OFFICE O/P EST MOD 30 MIN: CPT | Mod: S$PBB,,, | Performed by: INTERNAL MEDICINE

## 2024-08-29 PROCEDURE — A4216 STERILE WATER/SALINE, 10 ML: HCPCS | Performed by: INTERNAL MEDICINE

## 2024-08-29 RX ORDER — SODIUM CHLORIDE 0.9 % (FLUSH) 0.9 %
10 SYRINGE (ML) INJECTION
Status: DISCONTINUED | OUTPATIENT
Start: 2024-08-29 | End: 2024-08-29 | Stop reason: HOSPADM

## 2024-08-29 RX ORDER — HEPARIN 100 UNIT/ML
500 SYRINGE INTRAVENOUS
Status: DISCONTINUED | OUTPATIENT
Start: 2024-08-29 | End: 2024-08-29 | Stop reason: HOSPADM

## 2024-08-29 RX ADMIN — HEPARIN 500 UNITS: 100 SYRINGE at 01:08

## 2024-08-29 RX ADMIN — SODIUM CHLORIDE, PRESERVATIVE FREE 10 ML: 5 INJECTION INTRAVENOUS at 01:08

## 2024-08-29 NOTE — Clinical Note
For surveillance, contrast-enhanced CT scan of chest in November  May discontinue folic acid supplement Follow-up with behavioral health for anxiety Follow-up in November with CT chest, CBC, CMP

## 2024-08-29 NOTE — PROGRESS NOTES
History:  Past Medical History:   Diagnosis Date    Anemia of chronic disease 5/15/2022    Attention deficit hyperactivity disorder (ADHD), predominantly inattentive type 7/15/2022    Cachexia 7/15/2022    Depression with anxiety 7/15/2022    Hypercalcemia 5/15/2022    Iron deficiency anemia 5/15/2022    Lung cancer     Pleural effusion on right 7/9/2022    Pure hypercholesterolemia 10/27/2022    SIADH (syndrome of inappropriate ADH production) 5/15/2022    Squamous cell carcinoma of lung 3/21/2022    Thrombocytosis 10/16/2023    Tobacco user 7/15/2022   Past medical history: Attention deficit disorder. Tobacco abuse. Tonsillectomy.  Social history: .  Lives in [place], [place].  Has 2 children.  Has worked as a  all his life (more than 30-35 years).  Smoked 1-1/2 packs of cigarettes daily for 15 years; then, 1 pack daily for 1 year; now, for last 2 months, 2 cigarettes daily.  Heavy alcohol use in the past; 8-10 beers daily for 10 years; quit 1-1/2 years ago.  No illicit drugs.  Family history: No family members with cancer.  Health maintenance: So far, does not have a primary care physician.  Apparently, he is going to see a primary care physician for the first time next week.  Past Surgical History:   Procedure Laterality Date    Bronchoscopy w endobronchial biopsy  08/18/2021    COLONOSCOPY  02/05/2024    Endobronchial ablation of left main stem occlusion  08/27/2021    MEDIPORT INSERTION, SINGLE  10/11/2021    TONSILLECTOMY  1981    US guided right thoracentesis Right 04/22/2022      Social History     Socioeconomic History    Marital status:      Spouse name: Veronica Pratt    Number of children: 2   Occupational History    Occupation:    Tobacco Use    Smoking status: Former     Current packs/day: 0.00     Average packs/day: 1.5 packs/day for 31.0 years (46.5 ttl pk-yrs)     Types: Cigarettes     Start date: 1/1/1990     Quit date: 1/1/2021     Years since  quitting: 3.6    Smokeless tobacco: Never   Substance and Sexual Activity    Alcohol use: Yes    Drug use: Never    Sexual activity: Yes      Family History   Problem Relation Name Age of Onset    ALS Mother  40    Nephrolithiasis Father      Cholecystitis Father      Psychosis Sister      Diabetes type II Son        Reason for Follow-up:  -Squamous cell carcinoma of lung, stage III  -right-sided exudative pleural effusion (S/P thoracentesis 02/15/2023)  -Microcytic anemia (relative iron deficiency)  -Hypercalcemia of malignancy  -Thrombocytosis  -Hyponatremia (SIADH)       History of Present Illness:   No chief complaint on file.        Oncologic/Hematologic History:  Oncology History   Squamous cell carcinoma of right lung   1/25/2022 - 12/6/2022 Chemotherapy    Treatment Summary   Plan Name: OP DURVALUMAB 1500 MG Q4W  Treatment Goal: Control  Status: Inactive  Start Date: 1/25/2022  End Date: 12/6/2022  Provider: Brendan Amaral MD  Chemotherapy: [No matching medication found in this treatment plan]     3/21/2022 Initial Diagnosis    Squamous cell carcinoma of lung     5/15/2022 Cancer Staged    Staging form: Lung, AJCC 8th Edition  - Clinical stage from 5/15/2022: Stage IIIB (cT4, cN2, cM0)     Patient is being followed for history of squamous cell carcinoma of lung, stage IIIB or stage IIIC, treated with chemoradiation therapy, followed by consolidation durvalumab.      Please see assessment and plan section for details.     09/20/2021:  Presents for initial medical oncology consultation    Interval History:  PORT FLUSH   [No matching plan found]   05/29/2024:   -05/06/2024:  Surveillance noncontrast chest CT:   1. No detrimental change from previous exam  2. Post treatment change with loss of normal fat planes at the right hilum and subcarinal region, similar narrowing of the right mainstem bronchus and similar perihilar peribronchovascular opacities.  Further characterization limited without  contrast.  -05/29/2024:  Labs reviewed; CBC normal; hemoglobin 14.5; serum iron 55, low; TIBC normal; ferritin 187.25, normal; folate 4.4, low; B12 level 753, normal; transferrin saturation 18%, low; CMP essentially unremarkable; LDH normal  Presents for a follow-up visit.  Doing very well.  No complaints.  Great appetite.  He discontinued smoking when he was diagnosed with cancer.  No chest pain, cough, dyspnea, hemoptysis, recurrent fevers or chills, any new lumps or lymphadenopathy, weakness, fatigue, anorexia, unintentional weight loss, unusual headaches, or focal neurological symptoms.  No abdominal pain, nausea, vomiting, change in bowel habits, or GI bleeding.  No bone pains.  ECOG 0.  Despite good appetite and eating well, folic acid deficiency is noted.  We will prescribe folic acid 1 mg p.o. q.day.    08/29/2024:   -08/22/2024:  Surveillance CT chest with contrast (comparison: CT chest 05/06/2024):  No recurrence or metastases; posttreatment changes of loss of normal fat planes at the right hilum and subcarinal region, similar narrowing of the right mainstem bronchus and similar peribronchovascular opacities; unchanged esophageal thickening  -08/29/2024:  CBC normal, hemoglobin 15.3; folic acid level 13.7, normalized  Presents for a follow-up visit.  Doing great.  He works in construction.  Says that he has not felt this good before.  Cough, in fact, has decreased.  No dyspnea, chest pain, hemoptysis, weakness, fatigue, or anorexia.  No unusual headaches or focal neurological symptoms.  Has not smoked in 3 years.  Prior to that, smoked 1-1/2 packs of cigarettes daily for 25 years.  Has been taking folic acid 1 mg daily.    Medications:  Current Outpatient Medications on File Prior to Visit   Medication Sig Dispense Refill    cloNIDine (CATAPRES) 0.1 MG tablet Take 1 tablet (0.1 mg total) by mouth nightly. 90 tablet 1    dextroamphetamine-amphetamine (ADDERALL) 20 mg tablet Take Adderall 20mg every morning  "and 20mg every day after lunch 60 tablet 0    EPINEPHrine (EPIPEN 2-SUSANA) 0.3 mg/0.3 mL AtIn Inject 0.3 mLs (0.3 mg total) into the muscle 1 (one) time if needed (allergic reaction). 1 each 1    folic acid (FOLVITE) 1 MG tablet Take 1 tablet (1 mg total) by mouth once daily. 30 tablet 3    ibuprofen (ADVIL,MOTRIN) 600 MG tablet Take 600 mg by mouth every 6 (six) hours as needed.      predniSONE (DELTASONE) 10 MG tablet TAKE ONE TABLET BY MOUTH EVERY DAY WITH FOOD AS NEEDED FOR WASP STING 10 tablet 4    sertraline (ZOLOFT) 100 MG tablet Take 1 tablet (100 mg total) by mouth once daily. 90 tablet 1     No current facility-administered medications on file prior to visit.     Review of Systems:   All systems reviewed and found to be negative except for the symptoms detailed above    Physical Examination:   VITAL SIGNS:   There were no vitals filed for this visit.    GENERAL:  In no apparent distress.    HEAD:  No signs of head trauma.  EYES:  Pupils are equal.  Extraocular motions intact.    EARS:  Hearing grossly intact.  MOUTH:  Oropharynx is normal.   NECK:  No adenopathy, no JVD.     CHEST:  Chest with clear breath sounds bilaterally.  No wheezes, rales, rhonchi.    CARDIAC:  Regular rate and rhythm.  S1 and S2, without murmurs, gallops, rubs.  VASCULAR:  No Edema.  Peripheral pulses normal and equal in all extremities.  ABDOMEN:  Soft, without detectable tenderness.  No sign of distention.  No   rebound or guarding, and no masses palpated.   Bowel Sounds normal.  MUSCULOSKELETAL:  Good range of motion of all major joints. Extremities without clubbing, cyanosis or edema.    NEUROLOGIC EXAM:  Alert and oriented x 3.  No focal sensory or strength deficits.   Speech normal.  Follows commands.  PSYCHIATRIC:  Mood normal.    No results for input(s): "CBC" in the last 72 hours.   No results for input(s): "CMP" in the last 72 hours.     Assessment:  Problem List Items Addressed This Visit    None    Presented with locally " advanced, obviously inoperable squamous cell carcinoma of lung;  large, 10 cm mediastinal mass, possibly arising in the right lower lung lobe, causing compressive effects on right mainstem bronchus and right pulmonary artery;  no metastases;  s/p bronchoscopic biopsy of carinal mass 08/18/2021;  large fungating mass at maximiliano, obliterating the right mainstem bronchus and partially occluding left mainstem;  causing hyponatremia, hypercalcemia;  worsening dyspnea, fever, postobstructive pneumonia     Squamous cell carcinoma of right lung:  -bronchoscopy 08/18/2021  -10 cm subcarinal mass with compressive effects, right lower lung lobe large masslike consolidation with central cavitation, large fungating mass at maximiliano, obliterating the right mainstem bronchus and partially occluding left mainstem  -cT4 cN2 M0, stage IIIB  -postobstructive pneumonia and hospitalization  -S/P laser tumor debulking at [facility] 08/27/2021  -hyponatremia secondary to SIADH  -hypercalcemia  -S/P palliative radiotherapy 09/2021, pending definitive concurrent chemoradiation therapy, with subjective and objective improvement  -Followed by sequential chemotherapy with cisplatin/docetaxel q3 weeks x4 cycles (10/2021-12/2021)  -considerable improvement  -followed by consolidation durvalumab every 4 weeks X 12 cycles (01/25/2022-12/06/2022)   -no recurrence or metastases on surveillance CT chest without contrast 02/06/2023  -slightly larger right pleural effusion on surveillance chest CT without contrast 02/06/2023  -development of exudative pleural effusion 02/15/2023, 700 cc removed; cytology never sent for  -CHRIS on chest CT 05/09/2023, 08/16/2023  -08/28/2023:  Patient canceled thoracentesis with IR  -CHRIS on CT chest without contrast 11/20/2023  -02/05/2024: Colonoscopy (Dr. Galen Macias, GI):  Multiple diverticula seen in the sigmoid colon and descending colon; repeat colonoscopy in 10 years  -02/06/2024:  Surveillance CT chest without contrast:   No significant interval change from previous exam; posttreatment changes; no recurrence or metastases  -surveillance noncontrast chest CT 05/06/2024:  No recurrence or progression  -surveillance CT chest 08/20/2024:  No recurrence or metastases      Molecular markers:  -EGFR mutation negative; PD-L1 negative (TPS 0%); KRAS mutation negative;  -ALK gene rearrangement negative; ROS1 gene rearrangement negative; NTRK NGS fusion panel negative  -BRAF mutation negative; RET gene rearrangement not detected         Microcytic anemia (relative iron deficiency +/- anemia of chronic disease/malignancy):  -Hemoglobin 6.8-9.3  -MCV 79.7  -MCH, MCHC low  -Stool for occult blood negative x2 (09/2021)  -B12 normal, 755 (09/06/2021)  -Serum iron 9, TIBC 157, transferrin saturation 6%, ferritin 795.5 (09/06/2021)  -Folate low, 6.5 (09/06/2021  -09/30/2021: Serum iron 24, TIBC 220, transferrin saturation 11%, ferritin 714.65 (anemia of chronic disease/relative iron deficiency); B12 level 1046; RBC folate 1147; hemoglobin 10.5  -05/29/2024: Hemoglobin 14.5; serum iron 55, low; TIBC normal; ferritin 187.25, normal; folate 4.4, low; B12 level 753, normal; transferrin saturation 18%, low; LDH normal         Hypercalcemia of malignancy:  -Calcium level:  10.3 on 08/06/2021 (albumin 2.5), etc.  -08/07/2021: Intact PTH level normal, 12.7; PTH related peptide <2.0  -09/10/2021: PTH related peptide <2.0  -Zometa 4 mg IV x1 (09/12/2021) >> hypercalcemia resolved   -09/30/2021: Ionized calcium level 5.1, normal (Serum calcium 9.3, albumin 2.6, corrected calcium 10.42)         Thrombocytosis:   (Subsequently, spontaneously resolved)  -Platelet count 413-588K  -Almost certainly, secondary to underlying malignancy and relative iron deficiency  -09/30/2021: ELDON-2 mutation negative  -10/08/2021: BCR-ABL1 1 fusion transcripts negative         Hyponatremia secondary to SIADH:  -Sodium 128-130  -Treated with fluid restriction, salt tablets (by  nephrology)        Plan:  For surveillance, contrast-enhanced CT scan of chest in 6 months May discontinue folic acid supplement  Follow-up with behavioral health for anxiety  Follow-up in 6 months with CT chest, CBC, CMP  -----------------------------------------------      Squamous cell carcinoma of right lung, cT4 cN2 M0, stage IIIB  Palliative radiotherapy 09/2021  Followed by sequential chemotherapy with cisplatin/docetaxel X 4 cycles (10/2021-12/2021)   Followed by consolidation durvalumab 01/2022-12/2022  >>>  Continue surveillance for stage III non-small cell lung cancer, treated with chemoradiation therapy  -Completed radiotherapy, followed by sequential chemotherapy 12/2021   -followed by consolidation durvalumab 01/2022-12/2022  -History and physical and chest CT +/- contrast every 3 months for 3 years (12/2021-12/2024), then every 6 months for 2 years (12/2024-12/2026), then history and physical and low-dose noncontrast enhanced chest CT annually  >>>  >>>  -surveillance noncontrast chest CT 05/06/2024:  No recurrence or progression  -surveillance CT chest 08/20/2024:  No recurrence or metastases   >>>  Repeat contrast-enhanced chest CT for surveillance in 6 months (per his request, rather than in 3 months)  -repeat screening colonoscopy in 10 years (in 2034)  -08/29/2024:  For now, he wishes to keep the MediPort in place    -Microcytic anemia  -Anemia of chronic disease/relative iron deficiency  -not an issue at this time    05/29/2024:  Folic acid level 4.4, low  -05/29/2024:  Folic acid 1 mg p.o. q.day, started  -08/29/2024:  CBC normal, hemoglobin 15.3; folic acid level 13.7, normalized  -08/29/2024: May discontinue folic acid    -03/03/2023: Anxiety and mood swings; no suicidal or homicidal ideation; no psychotic symptoms;   follow-up with behavioral health  -02/21/2024:  As of now, stable    -08/29/2024: Stable    History of smoking   -smoked 1-1/2 pack of cigarettes daily for 25  years  -08/29/2024: Tells me that he discontinued smoking 3 years ago     Hypercalcemia of malignancy  -S/p Zometa 4 mg IV x1 (09/12/2021)    Thrombocytosis; reactive; subsequently, spontaneously resolved     Hyponatremia secondary to SIADH secondary to malignancy  -Managed by renal; managed with fluid restriction  -resolved    Follow-up in 6 months with CT chest, CBC, CMP     Above discussed with him.  All questions answered.  Discussed labs and scans and gave him copies of relevant report.  He understands and agrees with this plan.    Follow-up:  No follow-ups on file.    Answers submitted by the patient for this visit:  Review of Systems Questionnaire (Submitted on 8/22/2024)  appetite change : No  unexpected weight change: No  mouth sores: No  visual disturbance: No  cough: No  shortness of breath: No  chest pain: No  abdominal pain: No  diarrhea: No  frequency: No  back pain: No  rash: No  headaches: No  adenopathy: No  nervous/ anxious: No

## 2024-08-29 NOTE — NURSING
Pt had an appt for labs and Dr. Amaral today. He walked over to infusion clinic to request his mediport to be flushed. He states it has been 6 months since his last flush. Pt did labs, got his port flushed, then brought back to waiting room to wait for Dr. Amaral. Next F scheduled for 11/29.

## 2024-08-29 NOTE — Clinical Note
Update: Repeat surveillance CT scan of chest in 6 months rather than in 3 months, then follow-up with CBC, CMP.

## 2024-09-04 ENCOUNTER — OFFICE VISIT (OUTPATIENT)
Dept: BEHAVIORAL HEALTH | Facility: CLINIC | Age: 49
End: 2024-09-04
Payer: COMMERCIAL

## 2024-09-04 VITALS
SYSTOLIC BLOOD PRESSURE: 130 MMHG | WEIGHT: 124.38 LBS | HEART RATE: 90 BPM | BODY MASS INDEX: 20.72 KG/M2 | DIASTOLIC BLOOD PRESSURE: 82 MMHG | HEIGHT: 65 IN

## 2024-09-04 DIAGNOSIS — F41.9 ANXIETY: ICD-10-CM

## 2024-09-04 DIAGNOSIS — F41.8 DEPRESSION WITH ANXIETY: Chronic | ICD-10-CM

## 2024-09-04 DIAGNOSIS — F90.0 ATTENTION DEFICIT HYPERACTIVITY DISORDER (ADHD), PREDOMINANTLY INATTENTIVE TYPE: Chronic | ICD-10-CM

## 2024-09-04 DIAGNOSIS — F93.0 SEPARATION ANXIETY: Chronic | ICD-10-CM

## 2024-09-04 DIAGNOSIS — F33.0 MILD EPISODE OF RECURRENT MAJOR DEPRESSIVE DISORDER: Primary | Chronic | ICD-10-CM

## 2024-09-04 DIAGNOSIS — F41.1 GAD (GENERALIZED ANXIETY DISORDER): Chronic | ICD-10-CM

## 2024-09-04 DIAGNOSIS — F90.9 ADULT ADHD: ICD-10-CM

## 2024-09-04 PROCEDURE — 99213 OFFICE O/P EST LOW 20 MIN: CPT | Mod: PBBFAC,PN | Performed by: NURSE PRACTITIONER

## 2024-09-04 PROCEDURE — 3079F DIAST BP 80-89 MM HG: CPT | Mod: CPTII,,, | Performed by: NURSE PRACTITIONER

## 2024-09-04 PROCEDURE — 3008F BODY MASS INDEX DOCD: CPT | Mod: CPTII,,, | Performed by: NURSE PRACTITIONER

## 2024-09-04 PROCEDURE — 1160F RVW MEDS BY RX/DR IN RCRD: CPT | Mod: CPTII,,, | Performed by: NURSE PRACTITIONER

## 2024-09-04 PROCEDURE — 1159F MED LIST DOCD IN RCRD: CPT | Mod: CPTII,,, | Performed by: NURSE PRACTITIONER

## 2024-09-04 PROCEDURE — 99214 OFFICE O/P EST MOD 30 MIN: CPT | Mod: S$PBB,,, | Performed by: NURSE PRACTITIONER

## 2024-09-04 PROCEDURE — 3075F SYST BP GE 130 - 139MM HG: CPT | Mod: CPTII,,, | Performed by: NURSE PRACTITIONER

## 2024-09-04 RX ORDER — CLONIDINE HYDROCHLORIDE 0.1 MG/1
0.1 TABLET ORAL NIGHTLY
Qty: 90 TABLET | Refills: 1 | Status: SHIPPED | OUTPATIENT
Start: 2024-09-04

## 2024-09-04 RX ORDER — SERTRALINE HYDROCHLORIDE 100 MG/1
100 TABLET, FILM COATED ORAL DAILY
Qty: 90 TABLET | Refills: 1 | Status: SHIPPED | OUTPATIENT
Start: 2024-09-04

## 2024-09-04 NOTE — PROGRESS NOTES
Follow-up #5  09/04/2024  HPI: Deepak Pratt is a 49 y.o. male here today for a psychiatric evaluation referred by PCP to the [facility] for depression and anxiety  Past Medical History: Squamous cell carcinoma of lung, Tobacco user Anemia of chronic disease, Cachexia, Hypercalcemia, Iron deficiency anemia, Pleural effusion on right, Pure hypercholesterolemia, SIADH (syndrome of inappropriate ADH production), ADHD, predominantly inattentive type, Depression with anxiety    On his last visit, patient was doing well. Had some anxiety especially when he has bigger work loads. He stated that he had tried Buspar in the past but it caused zapps in his head. He stated that he used to take Adderall 30mg TID and that it was helpful. Adderall 10mg twice a day started.    In between visits (06/10), patient stated that the Adderall 10mg BID was not helpful and he doubled the dose and found that it was more effective. He requested to take Adderall 20mg BID.     Today, patient states that he is doing well. He is still taking the Zoloft, Clonidine, and Adderall as prescribed. Has no complaints.  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     FU in 3 months    PHQ Score:   09/04/2024: 0  05/14/2024: none  11/14/2023: 0  07/13/2023: 1 minimal  05/15/2023: 1  03/24/2023: 5    DELIO-7 Score:   09/04/2024: 1  05/14/2024: mild  11/14/2023: 0  07/13/2023: 0 normal  05/15/2023: 3   03/24/2023: 10    Mental Status Evaluation:  Appearance:  age appropriate, casually dressed, neatly groomed   Behavior:  normal, cooperative   Speech:  no latency; no press   Mood:  euthymic   Affect:  mood-congruent   Thought Process:  normal and logical   Thought Content:  normal, no suicidality, no homicidality, delusions, or paranoia   Sensorium:  grossly intact   Cognition:  grossly intact   Insight:  intact   Judgment:  behavior is adequate to circumstances     Impression:  Anxiety/Separation  Anxiety  2. MDD  3. ADHD - inattentive    Plan:  Continue Clonidine 0.1mg at bedtime for both anxiety and inattention  2. Continue Zoloft to 100mg at HS for depression and anxiety  3. Continue Adderall IR 20mg BID   4. Follow-up in 8 weeks virtual        Follow-up #4   05/14/2024  HPI: Deepak Pratt is a 48 y.o. male here today for a psychiatric evaluation referred by PCP to the [facility] for depression and anxiety  Past Medical History: Squamous cell carcinoma of lung, Tobacco user Anemia of chronic disease, Cachexia, Hypercalcemia, Iron deficiency anemia, Pleural effusion on right, Pure hypercholesterolemia, SIADH (syndrome of inappropriate ADH production), ADHD, predominantly inattentive type, Depression with anxiety    On his last visit, no medication changes needed.    Today, patient states that he is doing well. States that he still has some anxiety especially when he has bigger work loads. He has tried Buspar in the past but it caused zapps in his head. He is anxious a couple of days of the week. He starts to sweat thinking about all that he has to do. He states that Adderall used to help him. He used to take 30mg TID. This was before his cancer diagnosis. He has been off of the Adderall for about 3 years.   Will start Adderall 10mg twice a day.     FU in 8 weeks    PHQ Score:   05/14/2024: none  11/14/2023: 0  07/13/2023: 1 minimal  05/15/2023: 1  03/24/2023: 5    DELIO-7 Score:   05/14/2024: mild  11/14/2023: 0  07/13/2023: 0 normal  05/15/2023: 3   03/24/2023: 10    Mental Status Evaluation:  Appearance:  age appropriate, casually dressed, neatly groomed   Behavior:  normal, cooperative   Speech:  no latency; no press   Mood:  euthymic   Affect:  mood-congruent   Thought Process:  normal and logical   Thought Content:  normal, no suicidality, no homicidality, delusions, or paranoia   Sensorium:  grossly intact   Cognition:  grossly intact   Insight:  intact   Judgment:  behavior is adequate to  circumstances     Impression:  Anxiety/Separation Anxiety  2. MDD  3. ADHD - inattentive    Plan:  Continue Clonidine 0.1mg at bedtime for both anxiety and inattention  2. Continue Zoloft to 100mg at HS for depression and anxiety  3. Start Adderall 10mg BID   4. Follow-up in 8 weeks virtual      Follow-up #3   11/14/2023  HPI: Deepak Pratt is a 48 y.o. male here today for a psychiatric evaluation referred by PCP to the [facility] for depression and anxiety  Past Medical History: Squamous cell carcinoma of lung, Tobacco user Anemia of chronic disease, Cachexia, Hypercalcemia, Iron deficiency anemia, Pleural effusion on right, Pure hypercholesterolemia, SIADH (syndrome of inappropriate ADH production), ADHD, predominantly inattentive type, Depression with anxiety    On his last visit, no medication changes needed.    Today, patient states that he is doing well. He is working. Most days he is off by noon.   No increase in his anxiety.   No med changes today.     FU in 6 months - virtual    PHQ Score:   11/14/2023: 0  07/13/2023: 1 minimal  05/15/2023: 1  03/24/2023: 5    DELIO-7 Score:   11/14/2023: 0  07/13/2023: 0 normal  05/15/2023: 3   03/24/2023: 10    Mental Status Evaluation:  Appearance:  age appropriate, casually dressed, neatly groomed   Behavior:  normal, cooperative   Speech:  no latency; no press   Mood:  euthymic   Affect:  mood-congruent   Thought Process:  normal and logical   Thought Content:  normal, no suicidality, no homicidality, delusions, or paranoia   Sensorium:  grossly intact   Cognition:  grossly intact   Insight:  intact   Judgment:  behavior is adequate to circumstances     Impression:  Anxiety/Separation Anxiety  2. MDD  3. ADHD - inattentive    Plan:  Continue Clonidine 0.1mg at bedtime for both anxiety and inattention  2. Continue Zoloft to 100mg at HS for depression and anxiety  3. Continue 1:1 therapy   4. Follow-up in 6 months - virtual      Follow-up #2  07/13/2023  HPI: Deepak  Santa is a 48 y.o. male here today for a psychiatric evaluation referred by PCP to the [facility] for depression and anxiety  Past Medical History: Squamous cell carcinoma of lung, Tobacco user Anemia of chronic disease, Cachexia, Hypercalcemia, Iron deficiency anemia, Pleural effusion on right, Pure hypercholesterolemia, SIADH (syndrome of inappropriate ADH production), ADHD, predominantly inattentive type, Depression with anxiety    On his last visit, the Zoloft was increased to 100mg at HS.     Today, patient states that he is doing well.   He states that the increase in the Zoloft was helpful. Sates that he still has a little anxiety that is work related but that he feels more normal now than he has in a while.   He is also still taking the Clonidine 0.1mg at HS for anxiety and inattention.     He is sleeping well at night.   He is not having any issues with his blood pressure.     He states that the anxiety that he experiences is work related; that is expected and it passes once he gets started on the job.    No medication changes needed.  FU in 4 months    PHQ Score:   07/13/2023: 1 minimal  05/15/2023: 1  03/24/2023: 5    DELIO-7 Score:   07/13/2023: 0 normal  05/15/2023: 3   03/24/2023: 10    Mental Status Evaluation:  Appearance:  age appropriate, casually dressed, neatly groomed   Behavior:  normal, cooperative   Speech:  no latency; no press   Mood:  euthymic   Affect:  mood-congruent   Thought Process:  normal and logical   Thought Content:  normal, no suicidality, no homicidality, delusions, or paranoia   Sensorium:  grossly intact   Cognition:  grossly intact   Insight:  intact   Judgment:  behavior is adequate to circumstances     Impression:  Anxiety/Separation Anxiety  2. MDD  3. ADHD - inattentive    Plan:  Continue Clonidine 0.1mg at bedtime for both anxiety and inattention  2. Continue Zoloft to 100mg at HS for depression and anxiety  3. Continue 1:1 therapy   4. Follow-up in 4  months    Follow-up #1  05/15/2023  HPI: Deepak Pratt is a 48 y.o. male here today for a psychiatric evaluation referred by PCP to the [facility] for depression and anxiety  Past Medical History: Squamous cell carcinoma of lung, Tobacco user Anemia of chronic disease, Cachexia, Hypercalcemia, Iron deficiency anemia, Pleural effusion on right, Pure hypercholesterolemia, SIADH (syndrome of inappropriate ADH production), ADHD, predominantly inattentive type, Depression with anxiety    On his initial visit, patient was started on Clonidine 0.1mg at bedtime for both anxiety and inattention and Zoloft 25mg at bedtime x 2 weeks and then increase to 50mg at bedtime.    Today, patient states that he is doing much better. He still has some anxiety but not as much.     States that he is having an easier time going back to work. And while at work, he is not concentrating on how quickly he can finish and get back home.   He is getting out more. He and his family went to [place] for his [employer] graduation. States that prior to starting the Zoloft and Clonidine, he wanted to go to [place] and come right back home. But, he found that he was able to enjoy the trip and they made a vacation out of the trip.   He is able to talk about his health without becoming tearful. He is feeling more positive.     Will increase the Zoloft to 100mg at HS and continue the Clonidine 0.1mg q HS.     FU in 6 weeks    PHQ Score:   05/15/2023: 1  03/24/2023: 5    DELIO-7 Score:   05/15/2023: 3   03/24/2023: 10    Mental Status Evaluation:  Appearance:  age appropriate, casually dressed, neatly groomed   Behavior:  normal, cooperative   Speech:  no latency; no press   Mood:  euthymic   Affect:  mood-congruent   Thought Process:  normal and logical   Thought Content:  normal, no suicidality, no homicidality, delusions, or paranoia   Sensorium:  grossly intact   Cognition:  grossly intact   Insight:  intact   Judgment:  behavior is adequate to circumstances      Impression:  Anxiety/Separation Anxiety  2. MDD  3. ADHD - inattentive    Plan:  Clonidine 0.1mg at bedtime for both anxiety and inattention  2. Increase Zoloft to 100mg at HS for depression and anxiety  3. Continue 1:1 therapy   4. Follow-up in 6 weeks    Initial Interview  2023  HPI: Deepak Pratt is a 48 y.o. 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 He Once he gets to work, he is fine but finds it difficult to leave to go to work. He feels that he is lacking confidence.  He cannot identify triggers except that anxiety begins the day before (around 1200) he is to go to a job. He starts worrying about going to work the next day and becomes sweaty.   He states that he is the  and is worried about the job getting done correctly and on time.   Onset 2022 when he broke his ankle and could not go to work that day.      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   He was on Adderall at the time because he was inattentive and unorganized.    He does not want to get back on Adderall. He felt that it was working great but his wife states that his attitude was bad. He feels that he may have been addicted. The doses were getting higher and higher.     He explains that when he was originally diagnosed with cancer, he was given 2 months to two years to live. He lost 40lbs and was planning his . States that he did very well with chemo and  radiation and now should live into old age. States that he should be happy but that he still gets very emotional.   When he was told about his diagnoses, his was never given any therapy OR he thinks that he may have refused it. His wife told him that he needed therapy but he refused.    He states that he had started to accept the fact that he might die.   Mainly he was worried about his family having to live through his death.   He believes in a higher power.   He believes that there might be something after death but not sure what.     He wanted to be cremated.   He did not know whether or not death was going to be painful. He did not think about it too much.   He is not concerned about getting hurt at work.     May have some separation anxiety??    He does not have the energy that he used to prior to his cancer diagnosis. He says that he has a lot of people around to help him but he feels guilty for not having the energy or the will/desire.  He feels a little less than the man that he was.     He had a good appetite and sleeps well.     Will try Clonidine 0.1mg at HS to address both anxiety and inattention.   Will also start Zoloft 25mg every night to address anxiety and depression.     Medications:   Current Outpatient Medications   Medication Instructions    diclofenac sodium (VOLTAREN) 2 g, Topical (Top), 4 times daily PRN, Do not exceed 32 grams/day: do not to exceed 8 grams/day/single joint of upper extremities; do not to exceed 16 grams/day/single joint of lower extremities.  Please request refill of this medication from your PCP.    EPINEPHrine (EPIPEN 2-SUSANA) 0.3 mg, Intramuscular, Once    predniSONE (DELTASONE) 10 mg, Oral, Daily PRN        Psychiatric History:   Reports a prior psychiatric history: depression and anxiety  History of mental health out-patient treatment:   History of in-patient psychiatric hospitalization: denies  History of suicidal ideations: denies  History of suicidal threats:   History  of suicide attempts: denies  History of self mutilation:     History of psychotropic medications:   Buspar 7.5mg - gave him a sharp/strong buzz for about 30minutes and then nothing  Adderall - for ADD    Family Psychiatric History:  Mental Illness: mother Bipolar Disorder  Alcohol abuse/addiction:   Drug addiction:     Substance Use History:  Alcohol: Began drinking at 15 or 17 y/o. Hx 6-8 beers daily for 10 years.  Stopped drinking about 3 months prior to cancer diagnosis  Marijuana: denies  Benzodiazepines: denies  Opiates: denies  Stimulants: Adderall x15  years in the past - 90mg a day in the end. Stopped takikng the Adderall a month prior to being diagnosed with cancer  Cocaine: denies  Methamphetamine: denies  Nicotine: Stopped smoking cigarettes August 2021--had been using 1.5 packs/daily.  Began smoking at 14 y/o  Caffeine:    Social History:   Grew up in: [place]  Raised by: parents  Number of siblings: one sister  Education: HS grad  Employment: PT construction  Marital Status:  x 29 years  Children: 32yo son and a 28yo son  Living situation: lives in a house with his wife  [employer] orthodox affiliation:     Trauma History:  History of Emotional/Mental abuse: denies  History of Physical abuse: denies  History of Sexual abuse: denies  History of other trauma: being given a CA diagnosis and  told that he had 2months to 2 years to live    Legal History:  Legal history: denies  Denies being on probation or parole  Denies any upcoming court dates  Denies any pending charges.    PHQ Score:   03/24/2023: 5    DELIO-7 Score:   03/24/2023: 10    Mental Status Evaluation:  Appearance:  age appropriate, casually dressed, neatly groomed   Behavior:  normal, cooperative   Speech:  no latency; no press   Mood:  anxious, dysthymic   Affect:  mood-congruent   Thought Process:  normal and logical   Thought Content:  normal, no suicidality, no homicidality, delusions, or paranoia   Sensorium:  grossly intact   Cognition:   grossly intact   Insight:  intact   Judgment:  behavior is adequate to circumstances     Impression:  Anxiety/Separation Anxiety  2. MDD  3. ADHD - inattentive    Plan:  Clonidine 0.1mg at bedtime for both anxiety and inattention  2. Zoloft 25mg at bedtime x 2 weeks and then increase to 50mg at bedtime for depression and anxiety  3. Continue 1:1 therapy   4. Follow-up in 6 weeks

## 2024-09-23 RX ORDER — DEXTROAMPHETAMINE SACCHARATE, AMPHETAMINE ASPARTATE, DEXTROAMPHETAMINE SULFATE AND AMPHETAMINE SULFATE 5; 5; 5; 5 MG/1; MG/1; MG/1; MG/1
TABLET ORAL
Qty: 60 TABLET | Refills: 0 | Status: SHIPPED | OUTPATIENT
Start: 2024-09-23 | End: 2024-10-21

## 2024-09-23 NOTE — TELEPHONE ENCOUNTER
Please see attached refill request.    Next scheduled office visit: 12/4/2024.    Last office visit: 9/4/2024.     Thank you!

## 2024-10-10 PROBLEM — F93.0 SEPARATION ANXIETY: Chronic | Status: RESOLVED | Noted: 2024-09-04 | Resolved: 2024-10-10

## 2024-10-10 PROBLEM — Z92.3 STATUS POST CHEMORADIATION: Chronic | Status: ACTIVE | Noted: 2024-02-21

## 2024-10-10 PROBLEM — E22.2 SIADH (SYNDROME OF INAPPROPRIATE ADH PRODUCTION): Chronic | Status: RESOLVED | Noted: 2022-05-15 | Resolved: 2024-10-10

## 2024-10-10 PROBLEM — Z92.21 STATUS POST CHEMORADIATION: Chronic | Status: ACTIVE | Noted: 2024-02-21

## 2024-10-10 PROBLEM — E53.8 FOLATE DEFICIENCY: Chronic | Status: ACTIVE | Noted: 2024-05-29

## 2024-10-21 RX ORDER — DEXTROAMPHETAMINE SACCHARATE, AMPHETAMINE ASPARTATE, DEXTROAMPHETAMINE SULFATE AND AMPHETAMINE SULFATE 5; 5; 5; 5 MG/1; MG/1; MG/1; MG/1
TABLET ORAL
Qty: 60 TABLET | Refills: 0 | Status: SHIPPED | OUTPATIENT
Start: 2024-10-21 | End: 2024-11-18

## 2024-11-16 DIAGNOSIS — F90.0 ATTENTION DEFICIT HYPERACTIVITY DISORDER (ADHD), PREDOMINANTLY INATTENTIVE TYPE: Primary | ICD-10-CM

## 2024-11-18 RX ORDER — DEXTROAMPHETAMINE SACCHARATE, AMPHETAMINE ASPARTATE, DEXTROAMPHETAMINE SULFATE AND AMPHETAMINE SULFATE 5; 5; 5; 5 MG/1; MG/1; MG/1; MG/1
TABLET ORAL
Qty: 60 TABLET | Refills: 0 | Status: SHIPPED | OUTPATIENT
Start: 2024-11-18 | End: 2024-12-14

## 2024-12-04 ENCOUNTER — OFFICE VISIT (OUTPATIENT)
Dept: BEHAVIORAL HEALTH | Facility: CLINIC | Age: 49
End: 2024-12-04
Payer: COMMERCIAL

## 2024-12-04 DIAGNOSIS — F41.1 GAD (GENERALIZED ANXIETY DISORDER): Chronic | ICD-10-CM

## 2024-12-04 DIAGNOSIS — F90.0 ATTENTION DEFICIT HYPERACTIVITY DISORDER (ADHD), PREDOMINANTLY INATTENTIVE TYPE: Chronic | ICD-10-CM

## 2024-12-04 DIAGNOSIS — F33.0 MILD EPISODE OF RECURRENT MAJOR DEPRESSIVE DISORDER: Primary | Chronic | ICD-10-CM

## 2024-12-04 PROCEDURE — 1160F RVW MEDS BY RX/DR IN RCRD: CPT | Mod: CPTII,95,, | Performed by: NURSE PRACTITIONER

## 2024-12-04 PROCEDURE — 99214 OFFICE O/P EST MOD 30 MIN: CPT | Mod: 95,,, | Performed by: NURSE PRACTITIONER

## 2024-12-04 PROCEDURE — 1159F MED LIST DOCD IN RCRD: CPT | Mod: CPTII,95,, | Performed by: NURSE PRACTITIONER

## 2024-12-04 NOTE — PROGRESS NOTES
TELEMED VISIT  The patient location is: [place]  The chief complaint leading to consultation is: medication management of MDD, DELIO, ADHD    Visit type: audiovisual    Face to Face time with patient: 10minutes  15 minutes of total time spent on the encounter, which includes face to face time and non-face to face time preparing to see the patient (eg, review of tests), Obtaining and/or reviewing separately obtained history, Documenting clinical information in the electronic or other health record, Independently interpreting results (not separately reported) and communicating results to the patient/family/caregiver, or Care coordination (not separately reported).     Each patient to whom he or she provides medical services by telemedicine is:  (1) informed of the relationship between the physician and patient and the respective role of any other health care provider with respect to management of the patient; and (2) notified that he or she may decline to receive medical services by telemedicine and may withdraw from such care at any time.    Notes:     Follow-up #6  12/04/2024  HPI: Deepak Pratt is a 49 y.o. male here today for a psychiatric evaluation referred by PCP to the [facility] for depression and anxiety  Past Medical History: Squamous cell carcinoma of lung, Tobacco user Anemia of chronic disease, Cachexia, Hypercalcemia, Iron deficiency anemia, Pleural effusion on right, Pure hypercholesterolemia, SIADH (syndrome of inappropriate ADH production), ADHD, predominantly inattentive type, Depression with anxiety    On his last visit, patient stated that he was doing well; still taking the Zoloft, Clonidine, and Adderall as prescribed. Had no complaints.  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     Today, patient states that he is doing well.   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 He started weaning himself off of the Zoloft  and the Clonidine. He states that driving has been helpful; that he gets to talk to other people who are having hardships and he gets to share his story with them.   He is still taking the Adderall IR 20mg BID.    FU in 3 months    PHQ Score:   12/04/2024: virtual  09/04/2024: 0  05/14/2024: none  11/14/2023: 0  07/13/2023: 1 minimal  05/15/2023: 1  03/24/2023: 5    DELIO-7 Score:   12/04/2024: virtual  09/04/2024: 1  05/14/2024: mild  11/14/2023: 0  07/13/2023: 0 normal  05/15/2023: 3   03/24/2023: 10    Mental Status Evaluation:  Appearance:  age appropriate, casually dressed, neatly groomed   Behavior:  normal, cooperative   Speech:  no latency; no press   Mood:  euthymic   Affect:  mood-congruent   Thought Process:  normal and logical   Thought Content:  normal, no suicidality, no homicidality, delusions, or paranoia   Sensorium:  grossly intact   Cognition:  grossly intact   Insight:  intact   Judgment:  behavior is adequate to circumstances     Impression:  Anxiety/Separation Anxiety  2. MDD  3. ADHD - inattentive    Plan:  Discontinue Clonidine 0.1mg at bedtime for both anxiety and inattention  2. Decrease Zoloft to 50mg at HS for depression and anxiety and continue to wean off as tolerated  3. Continue Adderall IR 20mg BID   4. Follow-up in 8 weeks virtual          Follow-up #5  09/04/2024  HPI: Deepak Pratt is a 49 y.o. male here today for a psychiatric evaluation referred by PCP to the [facility] for depression and anxiety  Past Medical History: Squamous cell carcinoma of lung, Tobacco user Anemia of chronic disease, Cachexia, Hypercalcemia, Iron deficiency anemia, Pleural effusion on right, Pure hypercholesterolemia, SIADH (syndrome of inappropriate ADH production), ADHD, predominantly inattentive type, Depression with anxiety    On his last visit, patient was doing well. Had some anxiety especially when he has bigger work loads. He stated that he had tried Buspar in the past but it caused zapps in  his head. He stated that he used to take Adderall 30mg TID and that it was helpful. Adderall 10mg twice a day started.    In between visits (06/10), patient stated that the Adderall 10mg BID was not helpful and he doubled the dose and found that it was more effective. He requested to take Adderall 20mg BID.     Today, patient states that he is doing well. He is still taking the Zoloft, Clonidine, and Adderall as prescribed. Has no complaints.  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     FU in 3 months    PHQ Score:   09/04/2024: 0  05/14/2024: none  11/14/2023: 0  07/13/2023: 1 minimal  05/15/2023: 1  03/24/2023: 5    DELIO-7 Score:   09/04/2024: 1  05/14/2024: mild  11/14/2023: 0  07/13/2023: 0 normal  05/15/2023: 3   03/24/2023: 10    Mental Status Evaluation:  Appearance:  age appropriate, casually dressed, neatly groomed   Behavior:  normal, cooperative   Speech:  no latency; no press   Mood:  euthymic   Affect:  mood-congruent   Thought Process:  normal and logical   Thought Content:  normal, no suicidality, no homicidality, delusions, or paranoia   Sensorium:  grossly intact   Cognition:  grossly intact   Insight:  intact   Judgment:  behavior is adequate to circumstances     Impression:  Anxiety/Separation Anxiety  2. MDD  3. ADHD - inattentive    Plan:  Continue Clonidine 0.1mg at bedtime for both anxiety and inattention  2. Continue Zoloft to 100mg at HS for depression and anxiety  3. Continue Adderall IR 20mg BID   4. Follow-up in 8 weeks virtual        Follow-up #4   05/14/2024  HPI: Deepak Pratt is a 48 y.o. male here today for a psychiatric evaluation referred by PCP to the [facility] for depression and anxiety  Past Medical History: Squamous cell carcinoma of lung, Tobacco user Anemia of chronic disease, Cachexia, Hypercalcemia, Iron deficiency anemia, Pleural effusion on right, Pure hypercholesterolemia, SIADH (syndrome of inappropriate ADH  production), ADHD, predominantly inattentive type, Depression with anxiety    On his last visit, no medication changes needed.    Today, patient states that he is doing well. States that he still has some anxiety especially when he has bigger work loads. He has tried Buspar in the past but it caused zapps in his head. He is anxious a couple of days of the week. He starts to sweat thinking about all that he has to do. He states that Adderall used to help him. He used to take 30mg TID. This was before his cancer diagnosis. He has been off of the Adderall for about 3 years.   Will start Adderall 10mg twice a day.     FU in 8 weeks    PHQ Score:   05/14/2024: none  11/14/2023: 0  07/13/2023: 1 minimal  05/15/2023: 1  03/24/2023: 5    DELIO-7 Score:   05/14/2024: mild  11/14/2023: 0  07/13/2023: 0 normal  05/15/2023: 3   03/24/2023: 10    Mental Status Evaluation:  Appearance:  age appropriate, casually dressed, neatly groomed   Behavior:  normal, cooperative   Speech:  no latency; no press   Mood:  euthymic   Affect:  mood-congruent   Thought Process:  normal and logical   Thought Content:  normal, no suicidality, no homicidality, delusions, or paranoia   Sensorium:  grossly intact   Cognition:  grossly intact   Insight:  intact   Judgment:  behavior is adequate to circumstances     Impression:  Anxiety/Separation Anxiety  2. MDD  3. ADHD - inattentive    Plan:  Continue Clonidine 0.1mg at bedtime for both anxiety and inattention  2. Continue Zoloft to 100mg at HS for depression and anxiety  3. Start Adderall 10mg BID   4. Follow-up in 8 weeks virtual      Follow-up #3   11/14/2023  HPI: Deepak Pratt is a 48 y.o. male here today for a psychiatric evaluation referred by PCP to the [facility] for depression and anxiety  Past Medical History: Squamous cell carcinoma of lung, Tobacco user Anemia of chronic disease, Cachexia, Hypercalcemia, Iron deficiency anemia, Pleural effusion on right, Pure hypercholesterolemia,  SIADH (syndrome of inappropriate ADH production), ADHD, predominantly inattentive type, Depression with anxiety    On his last visit, no medication changes needed.    Today, patient states that he is doing well. He is working. Most days he is off by noon.   No increase in his anxiety.   No med changes today.     FU in 6 months - virtual    PHQ Score:   11/14/2023: 0  07/13/2023: 1 minimal  05/15/2023: 1  03/24/2023: 5    DELIO-7 Score:   11/14/2023: 0  07/13/2023: 0 normal  05/15/2023: 3   03/24/2023: 10    Mental Status Evaluation:  Appearance:  age appropriate, casually dressed, neatly groomed   Behavior:  normal, cooperative   Speech:  no latency; no press   Mood:  euthymic   Affect:  mood-congruent   Thought Process:  normal and logical   Thought Content:  normal, no suicidality, no homicidality, delusions, or paranoia   Sensorium:  grossly intact   Cognition:  grossly intact   Insight:  intact   Judgment:  behavior is adequate to circumstances     Impression:  Anxiety/Separation Anxiety  2. MDD  3. ADHD - inattentive    Plan:  Continue Clonidine 0.1mg at bedtime for both anxiety and inattention  2. Continue Zoloft to 100mg at HS for depression and anxiety  3. Continue 1:1 therapy   4. Follow-up in 6 months - virtual      Follow-up #2  07/13/2023  HPI: Deepak Pratt is a 48 y.o. male here today for a psychiatric evaluation referred by PCP to the [facility] for depression and anxiety  Past Medical History: Squamous cell carcinoma of lung, Tobacco user Anemia of chronic disease, Cachexia, Hypercalcemia, Iron deficiency anemia, Pleural effusion on right, Pure hypercholesterolemia, SIADH (syndrome of inappropriate ADH production), ADHD, predominantly inattentive type, Depression with anxiety    On his last visit, the Zoloft was increased to 100mg at HS.     Today, patient states that he is doing well.   He states that the increase in the Zoloft was helpful. Sates that he still has a little anxiety that is work  related but that he feels more normal now than he has in a while.   He is also still taking the Clonidine 0.1mg at HS for anxiety and inattention.     He is sleeping well at night.   He is not having any issues with his blood pressure.     He states that the anxiety that he experiences is work related; that is expected and it passes once he gets started on the job.    No medication changes needed.  FU in 4 months    PHQ Score:   07/13/2023: 1 minimal  05/15/2023: 1  03/24/2023: 5    DELIO-7 Score:   07/13/2023: 0 normal  05/15/2023: 3   03/24/2023: 10    Mental Status Evaluation:  Appearance:  age appropriate, casually dressed, neatly groomed   Behavior:  normal, cooperative   Speech:  no latency; no press   Mood:  euthymic   Affect:  mood-congruent   Thought Process:  normal and logical   Thought Content:  normal, no suicidality, no homicidality, delusions, or paranoia   Sensorium:  grossly intact   Cognition:  grossly intact   Insight:  intact   Judgment:  behavior is adequate to circumstances     Impression:  Anxiety/Separation Anxiety  2. MDD  3. ADHD - inattentive    Plan:  Continue Clonidine 0.1mg at bedtime for both anxiety and inattention  2. Continue Zoloft to 100mg at HS for depression and anxiety  3. Continue 1:1 therapy   4. Follow-up in 4 months    Follow-up #1  05/15/2023  HPI: Deepak Pratt is a 48 y.o. male here today for a psychiatric evaluation referred by PCP to the [facility] for depression and anxiety  Past Medical History: Squamous cell carcinoma of lung, Tobacco user Anemia of chronic disease, Cachexia, Hypercalcemia, Iron deficiency anemia, Pleural effusion on right, Pure hypercholesterolemia, SIADH (syndrome of inappropriate ADH production), ADHD, predominantly inattentive type, Depression with anxiety    On his initial visit, patient was started on Clonidine 0.1mg at bedtime for both anxiety and inattention and Zoloft 25mg at bedtime x 2 weeks and then increase to 50mg at  bedtime.    Today, patient states that he is doing much better. He still has some anxiety but not as much.     States that he is having an easier time going back to work. And while at work, he is not concentrating on how quickly he can finish and get back home.   He is getting out more. He and his family went to [place] for his [employer] graduation. States that prior to starting the Zoloft and Clonidine, he wanted to go to [place] and come right back home. But, he found that he was able to enjoy the trip and they made a vacation out of the trip.   He is able to talk about his health without becoming tearful. He is feeling more positive.     Will increase the Zoloft to 100mg at HS and continue the Clonidine 0.1mg q HS.     FU in 6 weeks    PHQ Score:   05/15/2023: 1  03/24/2023: 5    DELIO-7 Score:   05/15/2023: 3   03/24/2023: 10    Mental Status Evaluation:  Appearance:  age appropriate, casually dressed, neatly groomed   Behavior:  normal, cooperative   Speech:  no latency; no press   Mood:  euthymic   Affect:  mood-congruent   Thought Process:  normal and logical   Thought Content:  normal, no suicidality, no homicidality, delusions, or paranoia   Sensorium:  grossly intact   Cognition:  grossly intact   Insight:  intact   Judgment:  behavior is adequate to circumstances     Impression:  Anxiety/Separation Anxiety  2. MDD  3. ADHD - inattentive    Plan:  Clonidine 0.1mg at bedtime for both anxiety and inattention  2. Increase Zoloft to 100mg at HS for depression and anxiety  3. Continue 1:1 therapy   4. Follow-up in 6 weeks    Initial Interview  03/24/2023  HPI: Deepak Pratt is a 48 y.o. 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 He Once he gets to work, he is fine but finds it difficult to leave to go to work. He feels that he is lacking confidence.  He cannot identify triggers except that anxiety begins the day before (around 1200) he is to go to a job. He starts worrying about going to work the next day and becomes sweaty.   He states that he is the  and is worried about the job getting done correctly and on time.   Onset 2022 when he broke his ankle and could not go to work that day.      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   He was on Adderall at the time because he was inattentive and unorganized.    He does not want to get back on Adderall. He felt that it was working great but his wife states that his attitude was bad. He feels that he may have been addicted. The doses were getting higher and higher.     He explains that when he was originally diagnosed with cancer, he was given 2 months to two years to live. He lost 40lbs and was planning his . States that he did very well with chemo and radiation and now should live into old age. States that he should be happy but that he still gets very emotional.   When he was told about his diagnoses, his was never given any therapy OR he thinks that he may have refused it. His wife told him that he needed therapy but he refused.    He states that he had started to accept the fact that he might die.   Mainly he was worried about his family having to live through his death.   He believes in a higher power.   He believes that there might be something after death but not sure what.     He wanted to be cremated.   He did not know whether or not death was going to be painful. He did not think about it too much.    He is not concerned about getting hurt at work.     May have some separation anxiety??    He does not have the energy that he used to prior to his cancer diagnosis. He says that he has a lot of people around to help him but he feels guilty for not having the energy or the will/desire.  He feels a little less than the man that he was.     He had a good appetite and sleeps well.     Will try Clonidine 0.1mg at HS to address both anxiety and inattention.   Will also start Zoloft 25mg every night to address anxiety and depression.     Medications:   Current Outpatient Medications   Medication Instructions    diclofenac sodium (VOLTAREN) 2 g, Topical (Top), 4 times daily PRN, Do not exceed 32 grams/day: do not to exceed 8 grams/day/single joint of upper extremities; do not to exceed 16 grams/day/single joint of lower extremities.  Please request refill of this medication from your PCP.    EPINEPHrine (EPIPEN 2-SUSANA) 0.3 mg, Intramuscular, Once    predniSONE (DELTASONE) 10 mg, Oral, Daily PRN        Psychiatric History:   Reports a prior psychiatric history: depression and anxiety  History of mental health out-patient treatment:   History of in-patient psychiatric hospitalization: denies  History of suicidal ideations: denies  History of suicidal threats:   History of suicide attempts: denies  History of self mutilation:     History of psychotropic medications:   Buspar 7.5mg - gave him a sharp/strong buzz for about 30minutes and then nothing  Adderall - for ADD    Family Psychiatric History:  Mental Illness: mother Bipolar Disorder  Alcohol abuse/addiction:   Drug addiction:     Substance Use History:  Alcohol: Began drinking at 15 or 17 y/o. Hx 6-8 beers daily for 10 years.  Stopped drinking about 3 months prior to cancer diagnosis  Marijuana: denies  Benzodiazepines: denies  Opiates: denies  Stimulants: Adderall x15  years in the past - 90mg a day in the end. Stopped takikng the Adderall a month prior to being  diagnosed with cancer  Cocaine: denies  Methamphetamine: denies  Nicotine: Stopped smoking cigarettes August 2021--had been using 1.5 packs/daily.  Began smoking at 14 y/o  Caffeine:    Social History:   Grew up in: [place]  Raised by: parents  Number of siblings: one sister  Education: HS grad  Employment: PT construction  Marital Status:  x 29 years  Children: 32yo son and a 26yo son  Living situation: lives in a house with his wife  [employer] affiliation:     Trauma History:  History of Emotional/Mental abuse: denies  History of Physical abuse: denies  History of Sexual abuse: denies  History of other trauma: being given a CA diagnosis and  told that he had 2months to 2 years to live    Legal History:  Legal history: denies  Denies being on probation or parole  Denies any upcoming court dates  Denies any pending charges.    PHQ Score:   03/24/2023: 5    DELIO-7 Score:   03/24/2023: 10    Mental Status Evaluation:  Appearance:  age appropriate, casually dressed, neatly groomed   Behavior:  normal, cooperative   Speech:  no latency; no press   Mood:  anxious, dysthymic   Affect:  mood-congruent   Thought Process:  normal and logical   Thought Content:  normal, no suicidality, no homicidality, delusions, or paranoia   Sensorium:  grossly intact   Cognition:  grossly intact   Insight:  intact   Judgment:  behavior is adequate to circumstances     Impression:  Anxiety/Separation Anxiety  2. MDD  3. ADHD - inattentive    Plan:  Clonidine 0.1mg at bedtime for both anxiety and inattention  2. Zoloft 25mg at bedtime x 2 weeks and then increase to 50mg at bedtime for depression and anxiety  3. Continue 1:1 therapy   4. Follow-up in 6 weeks

## 2024-12-15 DIAGNOSIS — F90.0 ATTENTION DEFICIT HYPERACTIVITY DISORDER (ADHD), PREDOMINANTLY INATTENTIVE TYPE: ICD-10-CM

## 2024-12-16 RX ORDER — DEXTROAMPHETAMINE SACCHARATE, AMPHETAMINE ASPARTATE, DEXTROAMPHETAMINE SULFATE AND AMPHETAMINE SULFATE 5; 5; 5; 5 MG/1; MG/1; MG/1; MG/1
TABLET ORAL
Qty: 60 TABLET | Refills: 0 | Status: SHIPPED | OUTPATIENT
Start: 2024-12-16 | End: 2025-01-10

## 2024-12-16 NOTE — TELEPHONE ENCOUNTER
Please see attached refill request.    Next scheduled office visit: 3/17/2025.    Last office visit: 12/4/2024.     Thank you!

## 2025-01-10 DIAGNOSIS — F90.0 ATTENTION DEFICIT HYPERACTIVITY DISORDER (ADHD), PREDOMINANTLY INATTENTIVE TYPE: ICD-10-CM

## 2025-01-13 RX ORDER — DEXTROAMPHETAMINE SACCHARATE, AMPHETAMINE ASPARTATE, DEXTROAMPHETAMINE SULFATE AND AMPHETAMINE SULFATE 5; 5; 5; 5 MG/1; MG/1; MG/1; MG/1
TABLET ORAL
Qty: 60 TABLET | Refills: 0 | Status: SHIPPED | OUTPATIENT
Start: 2025-01-13 | End: 2025-02-04

## 2025-01-13 NOTE — PATIENT INSTRUCTIONS
- OTC cold/flu products as desired for symptoms  - Plenty of fluids  - Home from work/school  - Tylenol or Motrin for pain/fever  - COVID test NEGATIVE   Airway       Patient location during procedure: OR  Staff -        CRNA: Tammy Jeffrey APRN CRNA       Performed By: CRNA  Consent for Airway        Urgency: elective  Indications and Patient Condition       Indications for airway management: rubén-procedural         Mask difficulty assessment: 0 - not attempted    Final Airway Details       Final airway type: supraglottic airway    Supraglottic Airway Details        Type: LMA       LMA size: 5    Post intubation assessment        Placement verified by: capnometry and equal breath sounds        Number of attempts at approach: 1       Number of other approaches attempted: 0       Secured with: tape       Ease of procedure: easy       Dentition: Intact and Unchanged

## 2025-02-04 DIAGNOSIS — F90.0 ATTENTION DEFICIT HYPERACTIVITY DISORDER (ADHD), PREDOMINANTLY INATTENTIVE TYPE: ICD-10-CM

## 2025-02-05 RX ORDER — DEXTROAMPHETAMINE SACCHARATE, AMPHETAMINE ASPARTATE, DEXTROAMPHETAMINE SULFATE AND AMPHETAMINE SULFATE 5; 5; 5; 5 MG/1; MG/1; MG/1; MG/1
TABLET ORAL
Qty: 60 TABLET | Refills: 0 | Status: SHIPPED | OUTPATIENT
Start: 2025-02-05 | End: 2025-02-26

## 2025-02-07 ENCOUNTER — TELEPHONE (OUTPATIENT)
Dept: HEMATOLOGY/ONCOLOGY | Facility: CLINIC | Age: 50
End: 2025-02-07
Payer: COMMERCIAL

## 2025-03-06 DIAGNOSIS — F90.0 ATTENTION DEFICIT HYPERACTIVITY DISORDER (ADHD), PREDOMINANTLY INATTENTIVE TYPE: ICD-10-CM

## 2025-03-06 RX ORDER — DEXTROAMPHETAMINE SACCHARATE, AMPHETAMINE ASPARTATE, DEXTROAMPHETAMINE SULFATE AND AMPHETAMINE SULFATE 5; 5; 5; 5 MG/1; MG/1; MG/1; MG/1
TABLET ORAL
Qty: 60 TABLET | Refills: 0 | Status: SHIPPED | OUTPATIENT
Start: 2025-03-06 | End: 2025-03-27

## 2025-03-06 NOTE — TELEPHONE ENCOUNTER
Please see attached refill request.    Next scheduled office visit: 3/17/2025.    Last office visit: 12/4/2024.     Thank you!    Problem: Patient Care Overview (Adult)  Goal: Plan of Care Review  Outcome: Ongoing (interventions implemented as appropriate)   03/09/18 1313   Coping/Psychosocial Response Interventions   Plan Of Care Reviewed With patient   Patient Care Overview   Progress no change     Goal: Adult Individualization and Mutuality  Outcome: Ongoing (interventions implemented as appropriate)   03/09/18 1313   Individualization   Patient Specific Preferences none     Goal: Discharge Needs Assessment  Outcome: Ongoing (interventions implemented as appropriate)   03/09/18 1313   Discharge Needs Assessment   Concerns To Be Addressed no discharge needs identified       Problem: Perioperative Period (Adult)  Goal: Signs and Symptoms of Listed Potential Problems Will be Absent or Manageable (Perioperative Period)  Outcome: Ongoing (interventions implemented as appropriate)   03/09/18 1313   Perioperative Period   Problems Assessed (Perioperative Period) all   Problems Present (Perioperative Period) pain;physiologic stress response

## 2025-03-17 ENCOUNTER — OFFICE VISIT (OUTPATIENT)
Dept: BEHAVIORAL HEALTH | Facility: CLINIC | Age: 50
End: 2025-03-17
Payer: COMMERCIAL

## 2025-03-17 DIAGNOSIS — F90.0 ATTENTION DEFICIT HYPERACTIVITY DISORDER (ADHD), PREDOMINANTLY INATTENTIVE TYPE: Chronic | ICD-10-CM

## 2025-03-17 DIAGNOSIS — F41.1 GAD (GENERALIZED ANXIETY DISORDER): Chronic | ICD-10-CM

## 2025-03-17 DIAGNOSIS — F33.0 MILD EPISODE OF RECURRENT MAJOR DEPRESSIVE DISORDER: Primary | Chronic | ICD-10-CM

## 2025-03-17 PROCEDURE — 1159F MED LIST DOCD IN RCRD: CPT | Mod: CPTII,95,, | Performed by: NURSE PRACTITIONER

## 2025-03-17 PROCEDURE — 1160F RVW MEDS BY RX/DR IN RCRD: CPT | Mod: CPTII,95,, | Performed by: NURSE PRACTITIONER

## 2025-03-17 PROCEDURE — 98006 SYNCH AUDIO-VIDEO EST MOD 30: CPT | Mod: 95,,, | Performed by: NURSE PRACTITIONER

## 2025-03-17 NOTE — PROGRESS NOTES
TELEMED VISIT  The patient location is: [place]  The chief complaint leading to consultation is: medication management of MDD, DELIO, ADHD    Visit type: audiovisual    Face to Face time with patient: 10minutes  15 minutes of total time spent on the encounter, which includes face to face time and non-face to face time preparing to see the patient (eg, review of tests), Obtaining and/or reviewing separately obtained history, Documenting clinical information in the electronic or other health record, Independently interpreting results (not separately reported) and communicating results to the patient/family/caregiver, or Care coordination (not separately reported).     Each patient to whom he or she provides medical services by telemedicine is:  (1) informed of the relationship between the physician and patient and the respective role of any other health care provider with respect to management of the patient; and (2) notified that he or she may decline to receive medical services by telemedicine and may withdraw from such care at any time.    Notes:     Follow-up #7  03/17/2025  HPI: Deepak Pratt is a 49 y.o. male here today for a psychiatric evaluation referred by PCP to the [facility] for depression and anxiety  Past Medical History: Squamous cell carcinoma of lung, Tobacco user Anemia of chronic disease, Cachexia, Hypercalcemia, Iron deficiency anemia, Pleural effusion on right, Pure hypercholesterolemia, SIADH (syndrome of inappropriate ADH production), ADHD, predominantly inattentive type, Depression with anxiety    Last visit: patient states that he is doing well.   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 He started weaning himself off of the Zoloft and the Clonidine. He states that driving has been helpful; that he gets to talk to other people who are having hardships and he gets to share his story with them.   He is still taking the Adderall  IR 20mg BID.    This visit: states that he is doing well. He has weaned himself off of the Zoloft and Clonidine and is doing well. He is still taking Adderall IR 20mg BID and that dosage is working well. He has no questions or complaints. Will continue the Adderall.     FU in 3 months    PHQ Score:   03/17/2025: virtual  12/04/2024: virtual  09/04/2024: 0  05/14/2024: none  11/14/2023: 0  07/13/2023: 1 minimal  05/15/2023: 1  03/24/2023: 5    DELIO-7 Score:   03/17/2025: virtual  12/04/2024: virtual  09/04/2024: 1  05/14/2024: mild  11/14/2023: 0  07/13/2023: 0 normal  05/15/2023: 3   03/24/2023: 10    Mental Status Evaluation:  Appearance:  age appropriate, casually dressed, neatly groomed   Behavior:  normal, cooperative   Speech:  no latency; no press   Mood:  euthymic   Affect:  mood-congruent   Thought Process:  normal and logical   Thought Content:  normal, no suicidality, no homicidality, delusions, or paranoia   Sensorium:  grossly intact   Cognition:  grossly intact   Insight:  intact   Judgment:  behavior is adequate to circumstances     Impression:  Anxiety/Separation Anxiety  2. MDD  3. ADHD - inattentive    Plan:  Discontinue Clonidine 0.1mg at bedtime for both anxiety and inattention  2. Decrease Zoloft to 50mg at HS for depression and anxiety and continue to wean off as tolerated  3. Continue Adderall IR 20mg BID   4. Follow-up in 8 weeks virtual          Follow-up #6  12/04/2024  HPI: Deepak Pratt is a 49 y.o. male here today for a psychiatric evaluation referred by PCP to the [facility] for depression and anxiety  Past Medical History: Squamous cell carcinoma of lung, Tobacco user Anemia of chronic disease, Cachexia, Hypercalcemia, Iron deficiency anemia, Pleural effusion on right, Pure hypercholesterolemia, SIADH (syndrome of inappropriate ADH production), ADHD, predominantly inattentive type, Depression with anxiety    On his last visit, patient stated that he was doing well; still taking the  Zoloft, Clonidine, and Adderall as prescribed. Had no complaints.  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     Today, patient states that he is doing well.   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 He started weaning himself off of the Zoloft and the Clonidine. He states that driving has been helpful; that he gets to talk to other people who are having hardships and he gets to share his story with them.   He is still taking the Adderall IR 20mg BID.    FU in 3 months    PHQ Score:   12/04/2024: virtual  09/04/2024: 0  05/14/2024: none  11/14/2023: 0  07/13/2023: 1 minimal  05/15/2023: 1  03/24/2023: 5    DELIO-7 Score:   12/04/2024: virtual  09/04/2024: 1  05/14/2024: mild  11/14/2023: 0  07/13/2023: 0 normal  05/15/2023: 3   03/24/2023: 10    Mental Status Evaluation:  Appearance:  age appropriate, casually dressed, neatly groomed   Behavior:  normal, cooperative   Speech:  no latency; no press   Mood:  euthymic   Affect:  mood-congruent   Thought Process:  normal and logical   Thought Content:  normal, no suicidality, no homicidality, delusions, or paranoia   Sensorium:  grossly intact   Cognition:  grossly intact   Insight:  intact   Judgment:  behavior is adequate to circumstances     Impression:  Anxiety/Separation Anxiety  2. MDD  3. ADHD - inattentive    Plan:  Discontinue Clonidine 0.1mg at bedtime for both anxiety and inattention  2. Decrease Zoloft to 50mg at HS for depression and anxiety and continue to wean off as tolerated  3. Continue Adderall IR 20mg BID   4. Follow-up in 8 weeks virtual      Follow-up #5  09/04/2024  HPI: Deepak Pratt is a 49 y.o. male here today for a psychiatric evaluation referred by PCP to the [facility] for depression and anxiety  Past Medical History: Squamous cell carcinoma of lung, Tobacco user Anemia of chronic disease, Cachexia, Hypercalcemia, Iron deficiency anemia, Pleural effusion on  right, Pure hypercholesterolemia, SIADH (syndrome of inappropriate ADH production), ADHD, predominantly inattentive type, Depression with anxiety    On his last visit, patient was doing well. Had some anxiety especially when he has bigger work loads. He stated that he had tried Buspar in the past but it caused zapps in his head. He stated that he used to take Adderall 30mg TID and that it was helpful. Adderall 10mg twice a day started.    In between visits (06/10), patient stated that the Adderall 10mg BID was not helpful and he doubled the dose and found that it was more effective. He requested to take Adderall 20mg BID.     Today, patient states that he is doing well. He is still taking the Zoloft, Clonidine, and Adderall as prescribed. Has no complaints.  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     FU in 3 months    PHQ Score:   09/04/2024: 0  05/14/2024: none  11/14/2023: 0  07/13/2023: 1 minimal  05/15/2023: 1  03/24/2023: 5    DELIO-7 Score:   09/04/2024: 1  05/14/2024: mild  11/14/2023: 0  07/13/2023: 0 normal  05/15/2023: 3   03/24/2023: 10    Mental Status Evaluation:  Appearance:  age appropriate, casually dressed, neatly groomed   Behavior:  normal, cooperative   Speech:  no latency; no press   Mood:  euthymic   Affect:  mood-congruent   Thought Process:  normal and logical   Thought Content:  normal, no suicidality, no homicidality, delusions, or paranoia   Sensorium:  grossly intact   Cognition:  grossly intact   Insight:  intact   Judgment:  behavior is adequate to circumstances     Impression:  Anxiety/Separation Anxiety  2. MDD  3. ADHD - inattentive    Plan:  Continue Clonidine 0.1mg at bedtime for both anxiety and inattention  2. Continue Zoloft to 100mg at HS for depression and anxiety  3. Continue Adderall IR 20mg BID   4. Follow-up in 8 weeks virtual        Follow-up #4   05/14/2024  HPI: Deepak Pratt is a 48 y.o. male here today for a  psychiatric evaluation referred by PCP to the [facility] for depression and anxiety  Past Medical History: Squamous cell carcinoma of lung, Tobacco user Anemia of chronic disease, Cachexia, Hypercalcemia, Iron deficiency anemia, Pleural effusion on right, Pure hypercholesterolemia, SIADH (syndrome of inappropriate ADH production), ADHD, predominantly inattentive type, Depression with anxiety    On his last visit, no medication changes needed.    Today, patient states that he is doing well. States that he still has some anxiety especially when he has bigger work loads. He has tried Buspar in the past but it caused zapps in his head. He is anxious a couple of days of the week. He starts to sweat thinking about all that he has to do. He states that Adderall used to help him. He used to take 30mg TID. This was before his cancer diagnosis. He has been off of the Adderall for about 3 years.   Will start Adderall 10mg twice a day.     FU in 8 weeks    PHQ Score:   05/14/2024: none  11/14/2023: 0  07/13/2023: 1 minimal  05/15/2023: 1  03/24/2023: 5    DELIO-7 Score:   05/14/2024: mild  11/14/2023: 0  07/13/2023: 0 normal  05/15/2023: 3   03/24/2023: 10    Mental Status Evaluation:  Appearance:  age appropriate, casually dressed, neatly groomed   Behavior:  normal, cooperative   Speech:  no latency; no press   Mood:  euthymic   Affect:  mood-congruent   Thought Process:  normal and logical   Thought Content:  normal, no suicidality, no homicidality, delusions, or paranoia   Sensorium:  grossly intact   Cognition:  grossly intact   Insight:  intact   Judgment:  behavior is adequate to circumstances     Impression:  Anxiety/Separation Anxiety  2. MDD  3. ADHD - inattentive    Plan:  Continue Clonidine 0.1mg at bedtime for both anxiety and inattention  2. Continue Zoloft to 100mg at HS for depression and anxiety  3. Start Adderall 10mg BID   4. Follow-up in 8 weeks virtual      Follow-up #3   11/14/2023  HPI: Deepak Pratt  is a 48 y.o. male here today for a psychiatric evaluation referred by PCP to the [facility] for depression and anxiety  Past Medical History: Squamous cell carcinoma of lung, Tobacco user Anemia of chronic disease, Cachexia, Hypercalcemia, Iron deficiency anemia, Pleural effusion on right, Pure hypercholesterolemia, SIADH (syndrome of inappropriate ADH production), ADHD, predominantly inattentive type, Depression with anxiety    On his last visit, no medication changes needed.    Today, patient states that he is doing well. He is working. Most days he is off by noon.   No increase in his anxiety.   No med changes today.     FU in 6 months - virtual    PHQ Score:   11/14/2023: 0  07/13/2023: 1 minimal  05/15/2023: 1  03/24/2023: 5    DELIO-7 Score:   11/14/2023: 0  07/13/2023: 0 normal  05/15/2023: 3   03/24/2023: 10    Mental Status Evaluation:  Appearance:  age appropriate, casually dressed, neatly groomed   Behavior:  normal, cooperative   Speech:  no latency; no press   Mood:  euthymic   Affect:  mood-congruent   Thought Process:  normal and logical   Thought Content:  normal, no suicidality, no homicidality, delusions, or paranoia   Sensorium:  grossly intact   Cognition:  grossly intact   Insight:  intact   Judgment:  behavior is adequate to circumstances     Impression:  Anxiety/Separation Anxiety  2. MDD  3. ADHD - inattentive    Plan:  Continue Clonidine 0.1mg at bedtime for both anxiety and inattention  2. Continue Zoloft to 100mg at HS for depression and anxiety  3. Continue 1:1 therapy   4. Follow-up in 6 months - virtual      Follow-up #2  07/13/2023  HPI: Deepak Pratt is a 48 y.o. male here today for a psychiatric evaluation referred by PCP to the [facility] for depression and anxiety  Past Medical History: Squamous cell carcinoma of lung, Tobacco user Anemia of chronic disease, Cachexia, Hypercalcemia, Iron deficiency anemia, Pleural effusion on right, Pure hypercholesterolemia, SIADH  (syndrome of inappropriate ADH production), ADHD, predominantly inattentive type, Depression with anxiety    On his last visit, the Zoloft was increased to 100mg at HS.     Today, patient states that he is doing well.   He states that the increase in the Zoloft was helpful. Sates that he still has a little anxiety that is work related but that he feels more normal now than he has in a while.   He is also still taking the Clonidine 0.1mg at HS for anxiety and inattention.     He is sleeping well at night.   He is not having any issues with his blood pressure.     He states that the anxiety that he experiences is work related; that is expected and it passes once he gets started on the job.    No medication changes needed.  FU in 4 months    PHQ Score:   07/13/2023: 1 minimal  05/15/2023: 1  03/24/2023: 5    DELIO-7 Score:   07/13/2023: 0 normal  05/15/2023: 3   03/24/2023: 10    Mental Status Evaluation:  Appearance:  age appropriate, casually dressed, neatly groomed   Behavior:  normal, cooperative   Speech:  no latency; no press   Mood:  euthymic   Affect:  mood-congruent   Thought Process:  normal and logical   Thought Content:  normal, no suicidality, no homicidality, delusions, or paranoia   Sensorium:  grossly intact   Cognition:  grossly intact   Insight:  intact   Judgment:  behavior is adequate to circumstances     Impression:  Anxiety/Separation Anxiety  2. MDD  3. ADHD - inattentive    Plan:  Continue Clonidine 0.1mg at bedtime for both anxiety and inattention  2. Continue Zoloft to 100mg at HS for depression and anxiety  3. Continue 1:1 therapy   4. Follow-up in 4 months    Follow-up #1  05/15/2023  HPI: Deepak Pratt is a 48 y.o. male here today for a psychiatric evaluation referred by PCP to the [facility] for depression and anxiety  Past Medical History: Squamous cell carcinoma of lung, Tobacco user Anemia of chronic disease, Cachexia, Hypercalcemia, Iron deficiency anemia, Pleural effusion on right,  Pure hypercholesterolemia, SIADH (syndrome of inappropriate ADH production), ADHD, predominantly inattentive type, Depression with anxiety    On his initial visit, patient was started on Clonidine 0.1mg at bedtime for both anxiety and inattention and Zoloft 25mg at bedtime x 2 weeks and then increase to 50mg at bedtime.    Today, patient states that he is doing much better. He still has some anxiety but not as much.     States that he is having an easier time going back to work. And while at work, he is not concentrating on how quickly he can finish and get back home.   He is getting out more. He and his family went to [place] for his [employer] graduation. States that prior to starting the Zoloft and Clonidine, he wanted to go to [place] and come right back home. But, he found that he was able to enjoy the trip and they made a vacation out of the trip.   He is able to talk about his health without becoming tearful. He is feeling more positive.     Will increase the Zoloft to 100mg at HS and continue the Clonidine 0.1mg q HS.     FU in 6 weeks    PHQ Score:   05/15/2023: 1  03/24/2023: 5    DELIO-7 Score:   05/15/2023: 3   03/24/2023: 10    Mental Status Evaluation:  Appearance:  age appropriate, casually dressed, neatly groomed   Behavior:  normal, cooperative   Speech:  no latency; no press   Mood:  euthymic   Affect:  mood-congruent   Thought Process:  normal and logical   Thought Content:  normal, no suicidality, no homicidality, delusions, or paranoia   Sensorium:  grossly intact   Cognition:  grossly intact   Insight:  intact   Judgment:  behavior is adequate to circumstances     Impression:  Anxiety/Separation Anxiety  2. MDD  3. ADHD - inattentive    Plan:  Clonidine 0.1mg at bedtime for both anxiety and inattention  2. Increase Zoloft to 100mg at HS for depression and anxiety  3. Continue 1:1 therapy   4. Follow-up in 6 weeks    Initial Interview  03/24/2023  HPI: Deepak Pratt is a 48 y.o. 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 He Once he gets to work, he is fine but finds it difficult to leave to go to work. He feels that he is lacking confidence.  He cannot identify triggers except that anxiety begins the day before (around 1200) he is to go to a job. He starts worrying about going to work the next day and becomes sweaty.   He states that he is the  and is worried about the job getting done correctly and on time.   Onset 2022 when he broke his ankle and could not go to work that day.      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   He was on Adderall at the time because he was inattentive and unorganized.    He does not want to get back on Adderall. He felt that it was working great but his wife states that his attitude was bad. He feels that he may have been addicted. The doses were getting higher and higher.     He explains that when he was originally diagnosed with cancer, he was given 2 months to two years to live. He lost 40lbs and was planning his . States that he did very well with chemo and radiation and now should live into old age. States that he should be happy but that he still gets very emotional.   When he was told about his diagnoses, his was never given any therapy OR he thinks that he may have refused it. His wife told him that he needed therapy but he refused.    He states that he had started to accept the fact that he  might die.   Mainly he was worried about his family having to live through his death.   He believes in a higher power.   He believes that there might be something after death but not sure what.     He wanted to be cremated.   He did not know whether or not death was going to be painful. He did not think about it too much.   He is not concerned about getting hurt at work.     May have some separation anxiety??    He does not have the energy that he used to prior to his cancer diagnosis. He says that he has a lot of people around to help him but he feels guilty for not having the energy or the will/desire.  He feels a little less than the man that he was.     He had a good appetite and sleeps well.     Will try Clonidine 0.1mg at HS to address both anxiety and inattention.   Will also start Zoloft 25mg every night to address anxiety and depression.     Medications:   Current Outpatient Medications   Medication Instructions    diclofenac sodium (VOLTAREN) 2 g, Topical (Top), 4 times daily PRN, Do not exceed 32 grams/day: do not to exceed 8 grams/day/single joint of upper extremities; do not to exceed 16 grams/day/single joint of lower extremities.  Please request refill of this medication from your PCP.    EPINEPHrine (EPIPEN 2-SUSANA) 0.3 mg, Intramuscular, Once    predniSONE (DELTASONE) 10 mg, Oral, Daily PRN        Psychiatric History:   Reports a prior psychiatric history: depression and anxiety  History of mental health out-patient treatment:   History of in-patient psychiatric hospitalization: denies  History of suicidal ideations: denies  History of suicidal threats:   History of suicide attempts: denies  History of self mutilation:     History of psychotropic medications:   Buspar 7.5mg - gave him a sharp/strong buzz for about 30minutes and then nothing  Adderall - for ADD    Family Psychiatric History:  Mental Illness: mother Bipolar Disorder  Alcohol abuse/addiction:   Drug addiction:     Substance Use  History:  Alcohol: Began drinking at 15 or 15 y/o. Hx 6-8 beers daily for 10 years.  Stopped drinking about 3 months prior to cancer diagnosis  Marijuana: denies  Benzodiazepines: denies  Opiates: denies  Stimulants: Adderall x15  years in the past - 90mg a day in the end. Stopped takikng the Adderall a month prior to being diagnosed with cancer  Cocaine: denies  Methamphetamine: denies  Nicotine: Stopped smoking cigarettes August 2021--had been using 1.5 packs/daily.  Began smoking at 14 y/o  Caffeine:    Social History:   Grew up in: [place]  Raised by: parents  Number of siblings: one sister  Education: HS grad  Employment: PT construction  Marital Status:  x 29 years  Children: 30yo son and a 28yo son  Living situation: lives in a house with his wife  S[employer] affiliation:     Trauma History:  History of Emotional/Mental abuse: denies  History of Physical abuse: denies  History of Sexual abuse: denies  History of other trauma: being given a CA diagnosis and  told that he had 2months to 2 years to live    Legal History:  Legal history: denies  Denies being on probation or parole  Denies any upcoming court dates  Denies any pending charges.    PHQ Score:   03/24/2023: 5    DELIO-7 Score:   03/24/2023: 10    Mental Status Evaluation:  Appearance:  age appropriate, casually dressed, neatly groomed   Behavior:  normal, cooperative   Speech:  no latency; no press   Mood:  anxious, dysthymic   Affect:  mood-congruent   Thought Process:  normal and logical   Thought Content:  normal, no suicidality, no homicidality, delusions, or paranoia   Sensorium:  grossly intact   Cognition:  grossly intact   Insight:  intact   Judgment:  behavior is adequate to circumstances     Impression:  Anxiety/Separation Anxiety  2. MDD  3. ADHD - inattentive    Plan:  Clonidine 0.1mg at bedtime for both anxiety and inattention  2. Zoloft 25mg at bedtime x 2 weeks and then increase to 50mg at bedtime for depression and anxiety  3.  Continue 1:1 therapy   4. Follow-up in 6 weeks

## 2025-04-03 DIAGNOSIS — F90.0 ATTENTION DEFICIT HYPERACTIVITY DISORDER (ADHD), PREDOMINANTLY INATTENTIVE TYPE: ICD-10-CM

## 2025-04-03 RX ORDER — DEXTROAMPHETAMINE SACCHARATE, AMPHETAMINE ASPARTATE, DEXTROAMPHETAMINE SULFATE AND AMPHETAMINE SULFATE 5; 5; 5; 5 MG/1; MG/1; MG/1; MG/1
TABLET ORAL
Qty: 60 TABLET | Refills: 0 | Status: SHIPPED | OUTPATIENT
Start: 2025-04-03 | End: 2025-04-24

## 2025-04-03 NOTE — TELEPHONE ENCOUNTER
Please see attached refill request.    Next scheduled office visit: 6/17/2025.    Last office visit: 3/17/2025.     Thank you!

## 2025-04-07 PROBLEM — J90 EXUDATIVE PLEURAL EFFUSION: Chronic | Status: RESOLVED | Noted: 2023-03-02 | Resolved: 2025-04-07

## 2025-04-07 PROBLEM — C34.91: Chronic | Status: ACTIVE | Noted: 2023-12-09

## 2025-04-23 ENCOUNTER — HOSPITAL ENCOUNTER (OUTPATIENT)
Dept: RADIOLOGY | Facility: HOSPITAL | Age: 50
Discharge: HOME | End: 2025-04-23
Attending: INTERNAL MEDICINE
Payer: COMMERCIAL

## 2025-04-23 DIAGNOSIS — J90 RECURRENT RIGHT PLEURAL EFFUSION: ICD-10-CM

## 2025-04-23 DIAGNOSIS — C34.91 SQUAMOUS CELL CARCINOMA OF LUNG, STAGE III, RIGHT: ICD-10-CM

## 2025-04-23 DIAGNOSIS — C34.91 SQUAMOUS CELL CARCINOMA OF RIGHT LUNG: ICD-10-CM

## 2025-04-23 LAB
CREAT SERPL-MCNC: 1.01 MG/DL (ref 0.72–1.25)
GFR SERPLBLD CREATININE-BSD FMLA CKD-EPI: >60 ML/MIN/1.73/M2

## 2025-04-23 PROCEDURE — 25500020 PHARM REV CODE 255

## 2025-04-23 PROCEDURE — 71260 CT THORAX DX C+: CPT | Mod: TC

## 2025-04-23 PROCEDURE — 82565 ASSAY OF CREATININE: CPT | Performed by: INTERNAL MEDICINE

## 2025-04-23 RX ADMIN — IOHEXOL 60 ML: 350 INJECTION, SOLUTION INTRAVENOUS at 07:04

## 2025-04-29 ENCOUNTER — TELEPHONE (OUTPATIENT)
Dept: HEMATOLOGY/ONCOLOGY | Facility: CLINIC | Age: 50
End: 2025-04-29
Payer: COMMERCIAL

## 2025-04-30 ENCOUNTER — LAB VISIT (OUTPATIENT)
Dept: HEMATOLOGY/ONCOLOGY | Facility: CLINIC | Age: 50
End: 2025-04-30
Attending: INTERNAL MEDICINE
Payer: COMMERCIAL

## 2025-04-30 VITALS
RESPIRATION RATE: 18 BRPM | BODY MASS INDEX: 20.99 KG/M2 | HEART RATE: 105 BPM | DIASTOLIC BLOOD PRESSURE: 74 MMHG | SYSTOLIC BLOOD PRESSURE: 117 MMHG | OXYGEN SATURATION: 100 % | WEIGHT: 126 LBS | TEMPERATURE: 99 F | HEIGHT: 65 IN

## 2025-04-30 DIAGNOSIS — C34.91 SQUAMOUS CELL CARCINOMA OF LUNG, STAGE III, RIGHT: Chronic | ICD-10-CM

## 2025-04-30 DIAGNOSIS — C34.91 SQUAMOUS CELL CARCINOMA OF LUNG, STAGE III, RIGHT: ICD-10-CM

## 2025-04-30 DIAGNOSIS — E53.8 FOLATE DEFICIENCY: Chronic | ICD-10-CM

## 2025-04-30 DIAGNOSIS — E87.1 HYPONATREMIA: ICD-10-CM

## 2025-04-30 DIAGNOSIS — J90 RECURRENT RIGHT PLEURAL EFFUSION: Primary | Chronic | ICD-10-CM

## 2025-04-30 DIAGNOSIS — Z92.21 STATUS POST CHEMORADIATION: Chronic | ICD-10-CM

## 2025-04-30 DIAGNOSIS — C34.91 SQUAMOUS CELL CARCINOMA OF RIGHT LUNG: ICD-10-CM

## 2025-04-30 DIAGNOSIS — J90 RECURRENT RIGHT PLEURAL EFFUSION: ICD-10-CM

## 2025-04-30 DIAGNOSIS — C34.91 SQUAMOUS CELL CARCINOMA OF RIGHT LUNG: Chronic | ICD-10-CM

## 2025-04-30 DIAGNOSIS — D75.839 THROMBOCYTOSIS: Chronic | ICD-10-CM

## 2025-04-30 DIAGNOSIS — R13.19 ESOPHAGEAL DYSPHAGIA: ICD-10-CM

## 2025-04-30 DIAGNOSIS — Z92.3 STATUS POST CHEMORADIATION: Chronic | ICD-10-CM

## 2025-04-30 DIAGNOSIS — E83.52 HYPERCALCEMIA: Chronic | ICD-10-CM

## 2025-04-30 DIAGNOSIS — R13.10 DYSPHAGIA, UNSPECIFIED TYPE: Primary | ICD-10-CM

## 2025-04-30 DIAGNOSIS — J90 RECURRENT RIGHT PLEURAL EFFUSION: Primary | ICD-10-CM

## 2025-04-30 LAB
ALBUMIN SERPL-MCNC: 4 G/DL (ref 3.5–5)
ALBUMIN/GLOB SERPL: 0.9 RATIO (ref 1.1–2)
ALP SERPL-CCNC: 59 UNIT/L (ref 40–150)
ALT SERPL-CCNC: 8 UNIT/L (ref 0–55)
ANION GAP SERPL CALC-SCNC: 9 MEQ/L
AST SERPL-CCNC: 12 UNIT/L (ref 11–45)
BASOPHILS # BLD AUTO: 0.05 X10(3)/MCL
BASOPHILS NFR BLD AUTO: 0.8 %
BILIRUB SERPL-MCNC: 0.6 MG/DL
BUN SERPL-MCNC: 11.6 MG/DL (ref 8.4–25.7)
CALCIUM SERPL-MCNC: 9.5 MG/DL (ref 8.4–10.2)
CHLORIDE SERPL-SCNC: 101 MMOL/L (ref 98–107)
CO2 SERPL-SCNC: 26 MMOL/L (ref 22–29)
CREAT SERPL-MCNC: 0.91 MG/DL (ref 0.72–1.25)
CREAT/UREA NIT SERPL: 13
EOSINOPHIL # BLD AUTO: 0.1 X10(3)/MCL (ref 0–0.9)
EOSINOPHIL NFR BLD AUTO: 1.6 %
ERYTHROCYTE [DISTWIDTH] IN BLOOD BY AUTOMATED COUNT: 13.8 % (ref 11.5–17)
GFR SERPLBLD CREATININE-BSD FMLA CKD-EPI: >60 ML/MIN/1.73/M2
GLOBULIN SER-MCNC: 4.5 GM/DL (ref 2.4–3.5)
GLUCOSE SERPL-MCNC: 87 MG/DL (ref 74–100)
HCT VFR BLD AUTO: 46.8 % (ref 42–52)
HGB BLD-MCNC: 16.2 G/DL (ref 14–18)
IMM GRANULOCYTES # BLD AUTO: 0.02 X10(3)/MCL (ref 0–0.04)
IMM GRANULOCYTES NFR BLD AUTO: 0.3 %
LYMPHOCYTES # BLD AUTO: 1.17 X10(3)/MCL (ref 0.6–4.6)
LYMPHOCYTES NFR BLD AUTO: 19.2 %
MCH RBC QN AUTO: 30.5 PG (ref 27–31)
MCHC RBC AUTO-ENTMCNC: 34.6 G/DL (ref 33–36)
MCV RBC AUTO: 88 FL (ref 80–94)
MONOCYTES # BLD AUTO: 0.47 X10(3)/MCL (ref 0.1–1.3)
MONOCYTES NFR BLD AUTO: 7.7 %
NEUTROPHILS # BLD AUTO: 4.29 X10(3)/MCL (ref 2.1–9.2)
NEUTROPHILS NFR BLD AUTO: 70.4 %
NRBC BLD AUTO-RTO: 0 %
PLATELET # BLD AUTO: 213 X10(3)/MCL (ref 130–400)
PMV BLD AUTO: 10.1 FL (ref 7.4–10.4)
POTASSIUM SERPL-SCNC: 4.3 MMOL/L (ref 3.5–5.1)
PROT SERPL-MCNC: 8.5 GM/DL (ref 6.4–8.3)
RBC # BLD AUTO: 5.32 X10(6)/MCL (ref 4.7–6.1)
SODIUM SERPL-SCNC: 136 MMOL/L (ref 136–145)
WBC # BLD AUTO: 6.1 X10(3)/MCL (ref 4.5–11.5)

## 2025-04-30 PROCEDURE — 1160F RVW MEDS BY RX/DR IN RCRD: CPT | Mod: CPTII,,, | Performed by: INTERNAL MEDICINE

## 2025-04-30 PROCEDURE — 1159F MED LIST DOCD IN RCRD: CPT | Mod: CPTII,,, | Performed by: INTERNAL MEDICINE

## 2025-04-30 PROCEDURE — 3074F SYST BP LT 130 MM HG: CPT | Mod: CPTII,,, | Performed by: INTERNAL MEDICINE

## 2025-04-30 PROCEDURE — 3008F BODY MASS INDEX DOCD: CPT | Mod: CPTII,,, | Performed by: INTERNAL MEDICINE

## 2025-04-30 PROCEDURE — 99214 OFFICE O/P EST MOD 30 MIN: CPT | Mod: S$PBB,,, | Performed by: INTERNAL MEDICINE

## 2025-04-30 PROCEDURE — 3078F DIAST BP <80 MM HG: CPT | Mod: CPTII,,, | Performed by: INTERNAL MEDICINE

## 2025-04-30 PROCEDURE — 99213 OFFICE O/P EST LOW 20 MIN: CPT | Mod: PBBFAC | Performed by: INTERNAL MEDICINE

## 2025-04-30 PROCEDURE — 80053 COMPREHEN METABOLIC PANEL: CPT

## 2025-04-30 PROCEDURE — 85025 COMPLETE CBC W/AUTO DIFF WBC: CPT

## 2025-04-30 NOTE — PROGRESS NOTES
History:  Past Medical History:   Diagnosis Date    Anemia of chronic disease 5/15/2022    Attention deficit hyperactivity disorder (ADHD), predominantly inattentive type 7/15/2022    Cachexia 7/15/2022    Depression with anxiety 7/15/2022    Hypercalcemia 5/15/2022    Iron deficiency anemia 5/15/2022    Lung cancer     Pleural effusion on right 2022    Pure hypercholesterolemia 10/27/2022    SIADH (syndrome of inappropriate ADH production) 5/15/2022    Squamous cell carcinoma of lung 3/21/2022    Thrombocytosis 10/16/2023    Tobacco user 7/15/2022     Past Surgical History:   Procedure Laterality Date    Bronchoscopy w endobronchial biopsy  2021    COLON SURGERY      COLONOSCOPY  2024    Endobronchial ablation of left main stem occlusion  2021    MEDIPORT INSERTION, SINGLE  10/11/2021    TONSILLECTOMY  1981    US guided right thoracentesis Right 2022      Social History     Socioeconomic History    Marital status:      Spouse name: Veronica Pratt    Number of children: 2   Occupational History    Occupation:    Tobacco Use    Smoking status: Former     Current packs/day: 0.00     Average packs/day: 1.5 packs/day for 31.0 years (46.5 ttl pk-yrs)     Types: Cigarettes     Start date: 1990     Quit date: 2021     Years since quittin.3    Smokeless tobacco: Never   Substance and Sexual Activity    Alcohol use: Yes    Drug use: Never    Sexual activity: Yes     Social Drivers of Health     Financial Resource Strain: Low Risk  (3/16/2025)    Overall Financial Resource Strain (CARDIA)     Difficulty of Paying Living Expenses: Not very hard   Food Insecurity: No Food Insecurity (3/16/2025)    Hunger Vital Sign     Worried About Running Out of Food in the Last Year: Never true     Ran Out of Food in the Last Year: Never true   Transportation Needs: No Transportation Needs (3/16/2025)    PRAPARE - Transportation     Lack of Transportation (Medical): No     Lack of  Transportation (Non-Medical): No   Physical Activity: Insufficiently Active (3/16/2025)    Exercise Vital Sign     Days of Exercise per Week: 3 days     Minutes of Exercise per Session: 10 min   Stress: No Stress Concern Present (3/16/2025)    [employer] - Occupational Stress Questionnaire     Feeling of Stress : Not at all   Housing Stability: Low Risk  (3/16/2025)    Housing Stability Vital Sign     Unable to Pay for Housing in the Last Year: No     Number of Times Moved in the Last Year: 0     Homeless in the Last Year: No      Family History   Problem Relation Name Age of Onset    ALS Mother  40    Nephrolithiasis Father      Cholecystitis Father      Psychosis Sister      Diabetes type II Son        Reason for Follow-up:  -Squamous cell carcinoma of lung, stage III  -right-sided exudative pleural effusion (S/P thoracentesis 02/15/2023)  -Microcytic anemia (relative iron deficiency)  -Hypercalcemia of malignancy  -Thrombocytosis  -Hyponatremia (SIADH)       History of Present Illness:   Squamous cell carcinoma of lung, stage III, right        Oncologic/Hematologic History:  Oncology History   Squamous cell carcinoma of right lung   1/25/2022 - 12/6/2022 Chemotherapy    Treatment Summary   Plan Name: OP DURVALUMAB 1500 MG Q4W  Treatment Goal: Control  Status: Inactive  Start Date: 1/25/2022  End Date: 12/6/2022  Provider: Brendan Amaral MD  Chemotherapy: [No matching medication found in this treatment plan]     3/21/2022 Initial Diagnosis    Squamous cell carcinoma of lung     5/15/2022 Cancer Staged    Staging form: Lung, AJCC 8th Edition  - Clinical stage from 5/15/2022: Stage IIIB (cT4, cN2, cM0)     Past medical history: Attention deficit disorder. Tobacco abuse. Tonsillectomy.  Social history: .  Lives in [place], [place].  Has 2 children.  Has worked as a  all his life (more than 30-35 years).  Smoked 1-1/2 packs of cigarettes daily for 15  years; then, 1 pack daily for 1 year; now, for last 2 months, 2 cigarettes daily.  Heavy alcohol use in the past; 8-10 beers daily for 10 years; quit 1-1/2 years ago.  No illicit drugs.  Family history: No family members with cancer.  Health maintenance: So far, does not have a primary care physician.  Apparently, he is going to see a primary care physician for the first time next week.      Patient is being followed for history of squamous cell carcinoma of lung, stage IIIB or stage IIIC, treated with chemoradiation therapy, followed by consolidation durvalumab.    Please see assessment and plan section for details.     09/20/2021:  Presents for initial medical oncology consultation    Interval History:  PORT FLUSH   [No matching plan found]     04/30/2025:   -04/23/2025: Surveillance CT chest with contrast (comparison:  CT chest 08/22/2024):  Stable exam without significant interval change  -04/30/2025: CBC normal, hemoglobin 16.2, hematocrit 46.8, CMP reviewed (essentially unremarkable)  Presents for a follow-up visit, accompanied by a female .  In no acute discomfort.  Occasional dysphagia to solids upper central part of chest; sometimes, he has to spit/regurgitate swallowed food; other times, food bolus is able to be pushed down esophagus with fluids.  This does not happen consistently.  Denies hematemesis, melena, or hematochezia.  No anorexia or unintentional weight loss.  ECOG 0.  Denies weakness, fatigue, or any new lumps or lymphadenopathy.  He smoked 1-1/2 packs of cigarettes daily for 25 years; discontinue smoking 3 years ago.  No dyspnea, chest pain, or hemoptysis.  ECOG 0.  No unusual headaches or focal neurological symptoms, either.      Immunization History   Administered Date(s) Administered    Tdap 07/31/2017     Review of patient's allergies indicates:   Allergen Reactions    Penicillins      Other reaction(s): unknown    Wasp venom Swelling     Medications:  Current Outpatient  Medications on File Prior to Visit   Medication Sig Dispense Refill    dextroamphetamine-amphetamine (ADDERALL) 20 mg tablet Take Adderall 20mg every morning and 20mg every day after lunch 60 tablet 0    EPINEPHrine (EPIPEN 2-SUSANA) 0.3 mg/0.3 mL AtIn Inject 0.3 mLs (0.3 mg total) into the muscle 1 (one) time if needed (allergic reaction). 1 each 1    predniSONE (DELTASONE) 10 MG tablet TAKE ONE TABLET BY MOUTH EVERY DAY WITH FOOD AS NEEDED FOR WASP STING 10 tablet 4     No current facility-administered medications on file prior to visit.     Review of Systems:   All systems reviewed and found to be negative except for the symptoms detailed above    Physical Examination:   VITAL SIGNS:   Vitals:    04/30/25 1327   BP: 117/74   Pulse: 105   Resp: 18   Temp: 98.5 °F (36.9 °C)       GENERAL:  In no apparent distress.    HEAD:  No signs of head trauma.  EYES:  Pupils are equal.  Extraocular motions intact.    EARS:  Hearing grossly intact.  MOUTH:  Oropharynx is normal.   NECK:  No adenopathy, no JVD.     CHEST:  Chest with clear breath sounds bilaterally.  No wheezes, rales, rhonchi.    CARDIAC:  Regular rate and rhythm.  S1 and S2, without murmurs, gallops, rubs.  VASCULAR:  No Edema.  Peripheral pulses normal and equal in all extremities.  ABDOMEN:  Soft, without detectable tenderness.  No sign of distention.  No   rebound or guarding, and no masses palpated.   Bowel Sounds normal.  MUSCULOSKELETAL:  Good range of motion of all major joints. Extremities without clubbing, cyanosis or edema.    NEUROLOGIC EXAM:  Alert and oriented x 3.  No focal sensory or strength deficits.   Speech normal.  Follows commands.  PSYCHIATRIC:  Mood normal.    Assessment:  Problem List Items Addressed This Visit       Squamous cell carcinoma of right lung (Chronic)    Hypercalcemia (Chronic)    Recurrent right pleural effusion - Primary (Chronic)    Thrombocytosis (Chronic)    Squamous cell carcinoma of lung, stage III, right (Chronic)     Status post chemoradiation (Chronic)    Folate deficiency (Chronic)    Hyponatremia    Esophageal dysphagia     Orders for 04/30/2025:  Surveillance noncontrast chest CT in October, then follow-up with CBC and CMP  Refer to GI for EGD for evaluation of dysphagia    Above discussed with the patient.  All questions answered.    Discussed labs and scans and gave him copies of relevant results.  Importance of continued abstinence from smoking discussed.    He understands and agrees with this plan.  ===================================    # Squamous cell carcinoma of right lung:  -bronchoscopy 08/18/2021  -10 cm subcarinal mass with compressive effects, right lower lung lobe large masslike consolidation with central cavitation, large fungating mass at maximiliano, obliterating the right mainstem bronchus and partially occluding left mainstem  -cT4 cN2 M0, stage IIIB  -postobstructive pneumonia and hospitalization  -S/P laser tumor debulking at [facility] 08/27/2021  -hyponatremia secondary to SIADH  -hypercalcemia  -post obstructive pneumonia, fever, worsening dyspnea, requiring   palliative radiotherapy 09/2021, with subjective and objective improvement  -Followed by sequential chemotherapy with cisplatin/docetaxel q3 weeks x4 cycles (10/2021-12/2021)  -c experienced onsiderable improvement  -followed by consolidation durvalumab every 4 weeks X 12 cycles (01/25/2022-12/06/2022)   -no recurrence or metastases on surveillance CT chest without contrast 02/06/2023  -slightly larger right pleural effusion on surveillance chest CT without contrast 02/06/2023  -development of exudative pleural effusion 02/15/2023, 700 cc removed; cytology never sent for  -CHRIS on chest CT 05/09/2023, 08/16/2023  -08/28/2023:  Patient canceled thoracentesis with IR  -CHRIS on CT chest without contrast 11/20/2023  -02/05/2024: Colonoscopy (Dr. Galen Macias, GI):  Multiple diverticula seen in the sigmoid colon and descending colon; repeat colonoscopy in 10  years  -02/06/2024:  Surveillance CT chest without contrast:  No significant interval change from previous exam; posttreatment changes; no recurrence or metastases  -surveillance noncontrast chest CT 05/06/2024:  No recurrence or progression  -surveillance CT chest 08/20/2024:  No recurrence or metastases  -surveillance CT chest 04/23/2025: CHRIS  >>>  -continue surveillance (see below)   -noncontrast chest CT for surveillance in 6 months (October 2025)  -repeat screening colonoscopy in 10 years (in 2034)  -08/29/2024:  For now, he wishes to keep the MediPort in place  -04/30/2025:  Occasional dysphagia to solids which he localizes to upper central part of chest; we will refer to GI for EGD    Surveillance for stage III non-small cell lung cancer, treated with chemoradiation therapy:  -Completed radiotherapy, followed by sequential chemotherapy 12/2021   -followed by consolidation durvalumab 01/2022-12/2022  -History and physical and chest CT +/- contrast every 3 months for 3 years (12/2021-12/2024), then every 6 months for 2 years (12/2024-12/2026),   then history and physical and low-dose noncontrast enhanced chest CT annually    # Molecular markers:  -EGFR mutation negative; PD-L1 negative (TPS 0%); KRAS mutation negative;  -ALK gene rearrangement negative; ROS1 gene rearrangement negative; NTRK NGS fusion panel negative  -BRAF mutation negative; RET gene rearrangement not detected    # History of smoking:  -smoked 1-1/2 pack of cigarettes daily for 25 years  -08/29/2024: Tells me that he discontinued smoking 3 years ago     # Microcytic anemia (relative iron deficiency +/- anemia of chronic disease/malignancy):  -Hemoglobin 6.8-9.3  -MCV 79.7  -MCH, MCHC low  -Stool for occult blood negative x2 (09/2021)  -B12 normal, 755 (09/06/2021)  -Serum iron 9, TIBC 157, transferrin saturation 6%, ferritin 795.5 (09/06/2021)  -Folate low, 6.5 (09/06/2021  -09/30/2021: Serum iron 24, TIBC 220, transferrin saturation 11%,  ferritin 714.65 (anemia of chronic disease/relative iron deficiency); B12 level 1046; RBC folate 1147; hemoglobin 10.5  -05/29/2024: Hemoglobin 14.5; serum iron 55, low; TIBC normal; ferritin 187.25, normal; folate 4.4, low; B12 level 753, normal; transferrin saturation 18%, low; LDH normal  (resolved)      # Hypercalcemia of malignancy:  -Calcium level:  10.3 on 08/06/2021 (albumin 2.5), etc.  -08/07/2021: Intact PTH level normal, 12.7; PTH related peptide <2.0  -09/10/2021: PTH related peptide <2.0  -Zometa 4 mg IV x1 (09/12/2021) >> hypercalcemia resolved   -09/30/2021: Ionized calcium level 5.1, normal (Serum calcium 9.3, albumin 2.6, corrected calcium 10.42)  (resolved)     # Thrombocytosis (reactive):  (Subsequently, spontaneously resolved)  -Platelet count [id number]  -Almost certainly, secondary to underlying malignancy and relative iron deficiency  -09/30/2021: ELDON-2 mutation negative  -10/08/2021: BCR-ABL1 1 fusion transcripts negative  (Resolved)     # Hyponatremia secondary to SIADH:  -Sodium 128-130  -Treated with fluid restriction, salt tablets (by nephrology)  (Resolved)    05/29/2024:  Folic acid level 4.4, low  -05/29/2024:  Folic acid 1 mg p.o. q.day, started  -08/29/2024:  CBC normal, hemoglobin 15.3; folic acid level 13.7, normalized  -08/29/2024: May discontinue folic acid    -03/03/2023: Anxiety and mood swings; no suicidal or homicidal ideation; no psychotic symptoms;   follow-up with behavioral health  -02/21/2024:  As of now, stable  -08/29/2024: Stable       Follow-up:  No follow-ups on file.

## 2025-04-30 NOTE — Clinical Note
Orders for 04/30/2025: Surveillance noncontrast chest CT in October, then follow-up with CBC and CMP

## 2025-05-13 ENCOUNTER — TELEPHONE (OUTPATIENT)
Dept: GASTROENTEROLOGY | Facility: CLINIC | Age: 50
End: 2025-05-13
Payer: COMMERCIAL

## 2025-05-13 DIAGNOSIS — F90.0 ATTENTION DEFICIT HYPERACTIVITY DISORDER (ADHD), PREDOMINANTLY INATTENTIVE TYPE: ICD-10-CM

## 2025-05-13 RX ORDER — DEXTROAMPHETAMINE SACCHARATE, AMPHETAMINE ASPARTATE, DEXTROAMPHETAMINE SULFATE AND AMPHETAMINE SULFATE 5; 5; 5; 5 MG/1; MG/1; MG/1; MG/1
TABLET ORAL
Qty: 60 TABLET | Refills: 0 | Status: SHIPPED | OUTPATIENT
Start: 2025-05-13 | End: 2025-06-03

## 2025-05-13 NOTE — TELEPHONE ENCOUNTER
----- Message from Pepper sent at 5/13/2025 11:24 AM CDT -----  pt had colon/scope done at [place] with Dr. Khoury:

## 2025-06-17 ENCOUNTER — OFFICE VISIT (OUTPATIENT)
Dept: BEHAVIORAL HEALTH | Facility: CLINIC | Age: 50
End: 2025-06-17
Payer: COMMERCIAL

## 2025-06-17 DIAGNOSIS — F41.1 GAD (GENERALIZED ANXIETY DISORDER): Chronic | ICD-10-CM

## 2025-06-17 DIAGNOSIS — F33.0 MILD EPISODE OF RECURRENT MAJOR DEPRESSIVE DISORDER: Primary | Chronic | ICD-10-CM

## 2025-06-17 DIAGNOSIS — F90.0 ATTENTION DEFICIT HYPERACTIVITY DISORDER (ADHD), PREDOMINANTLY INATTENTIVE TYPE: Chronic | ICD-10-CM

## 2025-06-17 PROCEDURE — 1160F RVW MEDS BY RX/DR IN RCRD: CPT | Mod: CPTII,95,, | Performed by: NURSE PRACTITIONER

## 2025-06-17 PROCEDURE — 98006 SYNCH AUDIO-VIDEO EST MOD 30: CPT | Mod: 95,,, | Performed by: NURSE PRACTITIONER

## 2025-06-17 PROCEDURE — 1159F MED LIST DOCD IN RCRD: CPT | Mod: CPTII,95,, | Performed by: NURSE PRACTITIONER

## 2025-06-17 RX ORDER — DEXTROAMPHETAMINE SACCHARATE, AMPHETAMINE ASPARTATE, DEXTROAMPHETAMINE SULFATE AND AMPHETAMINE SULFATE 5; 5; 5; 5 MG/1; MG/1; MG/1; MG/1
TABLET ORAL
Qty: 60 TABLET | Refills: 0 | Status: SHIPPED | OUTPATIENT
Start: 2025-06-17 | End: 2025-07-08

## 2025-06-17 RX ORDER — DEXTROAMPHETAMINE SACCHARATE, AMPHETAMINE ASPARTATE, DEXTROAMPHETAMINE SULFATE AND AMPHETAMINE SULFATE 5; 5; 5; 5 MG/1; MG/1; MG/1; MG/1
1 TABLET ORAL 2 TIMES DAILY
Qty: 60 TABLET | Refills: 0 | Status: SHIPPED | OUTPATIENT
Start: 2025-08-17 | End: 2025-09-17

## 2025-06-17 RX ORDER — DEXTROAMPHETAMINE SACCHARATE, AMPHETAMINE ASPARTATE, DEXTROAMPHETAMINE SULFATE AND AMPHETAMINE SULFATE 5; 5; 5; 5 MG/1; MG/1; MG/1; MG/1
1 TABLET ORAL 2 TIMES DAILY
Qty: 60 TABLET | Refills: 0 | Status: SHIPPED | OUTPATIENT
Start: 2025-07-17 | End: 2025-08-17

## 2025-06-17 NOTE — PROGRESS NOTES
TELEMED VISIT  The patient location is: [place]  The chief complaint leading to consultation is: medication management of MDD, DELIO, ADHD    Visit type: audiovisual    Face to Face time with patient: 10minutes  15 minutes of total time spent on the encounter, which includes face to face time and non-face to face time preparing to see the patient (eg, review of tests), Obtaining and/or reviewing separately obtained history, Documenting clinical information in the electronic or other health record, Independently interpreting results (not separately reported) and communicating results to the patient/family/caregiver, or Care coordination (not separately reported).     Each patient to whom he or she provides medical services by telemedicine is:  (1) informed of the relationship between the physician and patient and the respective role of any other health care provider with respect to management of the patient; and (2) notified that he or she may decline to receive medical services by telemedicine and may withdraw from such care at any time.    Notes:     Follow-up #8  06/17/2025  HPI: Deepak Pratt is a 50 y.o. male here today for a psychiatric evaluation referred by PCP to the [facility] for depression and anxiety  Past Medical History: Squamous cell carcinoma of lung, Tobacco user Anemia of chronic disease, Cachexia, Hypercalcemia, Iron deficiency anemia, Pleural effusion on right, Pure hypercholesterolemia, SIADH (syndrome of inappropriate ADH production), ADHD, predominantly inattentive type, Depression with anxiety    Last visit: This visit: states that he is doing well. He has weaned himself off of the Zoloft and Clonidine and is doing well.  Denies being depressed or anxious.  He is still taking Adderall IR 20mg BID and that dosage is working well. He has no questions or complaints. Will continue the Adderall.     This visit:  Today, patient states that he is doing well.  He has stopped the  clonidine and has successfully weaned himself off of the Zoloft.  He is still taking Adderall IR 20 mg twice a day for ADHD.  He states that this dose is working well for him and he does not need any changes.  He is eating well and sleeping well.    FU in 3 months in clinic.    PHQ Score:   06/17/2025:  Virtual  03/17/2025: virtual  12/04/2024: virtual  09/04/2024: 0  05/14/2024: none  11/14/2023: 0  07/13/2023: 1 minimal  05/15/2023: 1  03/24/2023: 5    DELIO-7 Score:   06/17/2025: Virtual  03/17/2025: virtual  12/04/2024: virtual  09/04/2024: 1  05/14/2024: mild  11/14/2023: 0  07/13/2023: 0 normal  05/15/2023: 3   03/24/2023: 10    Mental Status Evaluation:  Appearance:  age appropriate, casually dressed, neatly groomed   Behavior:  normal, cooperative   Speech:  no latency; no press   Mood:  euthymic   Affect:  mood-congruent   Thought Process:  normal and logical   Thought Content:  normal, no suicidality, no homicidality, delusions, or paranoia   Sensorium:  grossly intact   Cognition:  grossly intact   Insight:  intact   Judgment:  behavior is adequate to circumstances     Impression:  Anxiety/Separation Anxiety  2. MDD  3. ADHD - inattentive    Plan:  1. Continue Adderall IR 20mg BID   2. Follow-up in 3 months in clinic      Follow-up #7  03/17/2025  HPI: Deepak Pratt is a 49 y.o. male here today for a psychiatric evaluation referred by PCP to the [facility] for depression and anxiety  Past Medical History: Squamous cell carcinoma of lung, Tobacco user Anemia of chronic disease, Cachexia, Hypercalcemia, Iron deficiency anemia, Pleural effusion on right, Pure hypercholesterolemia, SIADH (syndrome of inappropriate ADH production), ADHD, predominantly inattentive type, Depression with anxiety    Last visit: patient states that he is doing well.   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 He started weaning himself off of the Zoloft and the  Clonidine. He states that driving has been helpful; that he gets to talk to other people who are having hardships and he gets to share his story with them.   He is still taking the Adderall IR 20mg BID.    This visit: states that he is doing well. He has weaned himself off of the Zoloft and Clonidine and is doing well. He is still taking Adderall IR 20mg BID and that dosage is working well. He has no questions or complaints. Will continue the Adderall.     FU in 3 months    PHQ Score:   03/17/2025: virtual  12/04/2024: virtual  09/04/2024: 0  05/14/2024: none  11/14/2023: 0  07/13/2023: 1 minimal  05/15/2023: 1  03/24/2023: 5    DELIO-7 Score:   03/17/2025: virtual  12/04/2024: virtual  09/04/2024: 1  05/14/2024: mild  11/14/2023: 0  07/13/2023: 0 normal  05/15/2023: 3   03/24/2023: 10    Mental Status Evaluation:  Appearance:  age appropriate, casually dressed, neatly groomed   Behavior:  normal, cooperative   Speech:  no latency; no press   Mood:  euthymic   Affect:  mood-congruent   Thought Process:  normal and logical   Thought Content:  normal, no suicidality, no homicidality, delusions, or paranoia   Sensorium:  grossly intact   Cognition:  grossly intact   Insight:  intact   Judgment:  behavior is adequate to circumstances     Impression:  Anxiety/Separation Anxiety  2. MDD  3. ADHD - inattentive    Plan:  Discontinue Clonidine 0.1mg at bedtime for both anxiety and inattention  2. Decrease Zoloft to 50mg at HS for depression and anxiety and continue to wean off as tolerated  3. Continue Adderall IR 20mg BID   4. Follow-up in 8 weeks virtual          Follow-up #6  12/04/2024  HPI: Deepak Pratt is a 49 y.o. male here today for a psychiatric evaluation referred by PCP to the [facility] for depression and anxiety  Past Medical History: Squamous cell carcinoma of lung, Tobacco user Anemia of chronic disease, Cachexia, Hypercalcemia, Iron deficiency anemia, Pleural effusion on right, Pure hypercholesterolemia,  SIADH (syndrome of inappropriate ADH production), ADHD, predominantly inattentive type, Depression with anxiety    On his last visit, patient stated that he was doing well; still taking the Zoloft, Clonidine, and Adderall as prescribed. Had no complaints.  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     Today, patient states that he is doing well.   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 He started weaning himself off of the Zoloft and the Clonidine. He states that driving has been helpful; that he gets to talk to other people who are having hardships and he gets to share his story with them.   He is still taking the Adderall IR 20mg BID.    FU in 3 months    PHQ Score:   12/04/2024: virtual  09/04/2024: 0  05/14/2024: none  11/14/2023: 0  07/13/2023: 1 minimal  05/15/2023: 1  03/24/2023: 5    DELIO-7 Score:   12/04/2024: virtual  09/04/2024: 1  05/14/2024: mild  11/14/2023: 0  07/13/2023: 0 normal  05/15/2023: 3   03/24/2023: 10    Mental Status Evaluation:  Appearance:  age appropriate, casually dressed, neatly groomed   Behavior:  normal, cooperative   Speech:  no latency; no press   Mood:  euthymic   Affect:  mood-congruent   Thought Process:  normal and logical   Thought Content:  normal, no suicidality, no homicidality, delusions, or paranoia   Sensorium:  grossly intact   Cognition:  grossly intact   Insight:  intact   Judgment:  behavior is adequate to circumstances     Impression:  Anxiety/Separation Anxiety  2. MDD  3. ADHD - inattentive    Plan:  Discontinue Clonidine 0.1mg at bedtime for both anxiety and inattention  2. Decrease Zoloft to 50mg at HS for depression and anxiety and continue to wean off as tolerated  3. Continue Adderall IR 20mg BID   4. Follow-up in 8 weeks virtual      Follow-up #5  09/04/2024  HPI: Deepak Pratt is a 49 y.o. male here today for a psychiatric evaluation referred by PCP to the [facility] for  depression and anxiety  Past Medical History: Squamous cell carcinoma of lung, Tobacco user Anemia of chronic disease, Cachexia, Hypercalcemia, Iron deficiency anemia, Pleural effusion on right, Pure hypercholesterolemia, SIADH (syndrome of inappropriate ADH production), ADHD, predominantly inattentive type, Depression with anxiety    On his last visit, patient was doing well. Had some anxiety especially when he has bigger work loads. He stated that he had tried Buspar in the past but it caused zapps in his head. He stated that he used to take Adderall 30mg TID and that it was helpful. Adderall 10mg twice a day started.    In between visits (06/10), patient stated that the Adderall 10mg BID was not helpful and he doubled the dose and found that it was more effective. He requested to take Adderall 20mg BID.     Today, patient states that he is doing well. He is still taking the Zoloft, Clonidine, and Adderall as prescribed. Has no complaints.  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     FU in 3 months    PHQ Score:   09/04/2024: 0  05/14/2024: none  11/14/2023: 0  07/13/2023: 1 minimal  05/15/2023: 1  03/24/2023: 5    DELIO-7 Score:   09/04/2024: 1  05/14/2024: mild  11/14/2023: 0  07/13/2023: 0 normal  05/15/2023: 3   03/24/2023: 10    Mental Status Evaluation:  Appearance:  age appropriate, casually dressed, neatly groomed   Behavior:  normal, cooperative   Speech:  no latency; no press   Mood:  euthymic   Affect:  mood-congruent   Thought Process:  normal and logical   Thought Content:  normal, no suicidality, no homicidality, delusions, or paranoia   Sensorium:  grossly intact   Cognition:  grossly intact   Insight:  intact   Judgment:  behavior is adequate to circumstances     Impression:  Anxiety/Separation Anxiety  2. MDD  3. ADHD - inattentive    Plan:  Continue Clonidine 0.1mg at bedtime for both anxiety and inattention  2. Continue Zoloft to 100mg at HS  for depression and anxiety  3. Continue Adderall IR 20mg BID   4. Follow-up in 8 weeks virtual        Follow-up #4   05/14/2024  HPI: Deepak Pratt is a 48 y.o. male here today for a psychiatric evaluation referred by PCP to the [facility] for depression and anxiety  Past Medical History: Squamous cell carcinoma of lung, Tobacco user Anemia of chronic disease, Cachexia, Hypercalcemia, Iron deficiency anemia, Pleural effusion on right, Pure hypercholesterolemia, SIADH (syndrome of inappropriate ADH production), ADHD, predominantly inattentive type, Depression with anxiety    On his last visit, no medication changes needed.    Today, patient states that he is doing well. States that he still has some anxiety especially when he has bigger work loads. He has tried Buspar in the past but it caused zapps in his head. He is anxious a couple of days of the week. He starts to sweat thinking about all that he has to do. He states that Adderall used to help him. He used to take 30mg TID. This was before his cancer diagnosis. He has been off of the Adderall for about 3 years.   Will start Adderall 10mg twice a day.     FU in 8 weeks    PHQ Score:   05/14/2024: none  11/14/2023: 0  07/13/2023: 1 minimal  05/15/2023: 1  03/24/2023: 5    DELIO-7 Score:   05/14/2024: mild  11/14/2023: 0  07/13/2023: 0 normal  05/15/2023: 3   03/24/2023: 10    Mental Status Evaluation:  Appearance:  age appropriate, casually dressed, neatly groomed   Behavior:  normal, cooperative   Speech:  no latency; no press   Mood:  euthymic   Affect:  mood-congruent   Thought Process:  normal and logical   Thought Content:  normal, no suicidality, no homicidality, delusions, or paranoia   Sensorium:  grossly intact   Cognition:  grossly intact   Insight:  intact   Judgment:  behavior is adequate to circumstances     Impression:  Anxiety/Separation Anxiety  2. MDD  3. ADHD - inattentive    Plan:  Continue Clonidine 0.1mg at bedtime for both anxiety and  inattention  2. Continue Zoloft to 100mg at HS for depression and anxiety  3. Start Adderall 10mg BID   4. Follow-up in 8 weeks virtual      Follow-up #3   11/14/2023  HPI: Deepak Pratt is a 48 y.o. male here today for a psychiatric evaluation referred by PCP to the [facility] for depression and anxiety  Past Medical History: Squamous cell carcinoma of lung, Tobacco user Anemia of chronic disease, Cachexia, Hypercalcemia, Iron deficiency anemia, Pleural effusion on right, Pure hypercholesterolemia, SIADH (syndrome of inappropriate ADH production), ADHD, predominantly inattentive type, Depression with anxiety    On his last visit, no medication changes needed.    Today, patient states that he is doing well. He is working. Most days he is off by noon.   No increase in his anxiety.   No med changes today.     FU in 6 months - virtual    PHQ Score:   11/14/2023: 0  07/13/2023: 1 minimal  05/15/2023: 1  03/24/2023: 5    DELIO-7 Score:   11/14/2023: 0  07/13/2023: 0 normal  05/15/2023: 3   03/24/2023: 10    Mental Status Evaluation:  Appearance:  age appropriate, casually dressed, neatly groomed   Behavior:  normal, cooperative   Speech:  no latency; no press   Mood:  euthymic   Affect:  mood-congruent   Thought Process:  normal and logical   Thought Content:  normal, no suicidality, no homicidality, delusions, or paranoia   Sensorium:  grossly intact   Cognition:  grossly intact   Insight:  intact   Judgment:  behavior is adequate to circumstances     Impression:  Anxiety/Separation Anxiety  2. MDD  3. ADHD - inattentive    Plan:  Continue Clonidine 0.1mg at bedtime for both anxiety and inattention  2. Continue Zoloft to 100mg at HS for depression and anxiety  3. Continue 1:1 therapy   4. Follow-up in 6 months - virtual      Follow-up #2  07/13/2023  HPI: Deepak Pratt is a 48 y.o. male here today for a psychiatric evaluation referred by PCP to the [facility] for depression and anxiety  Past Medical History:  Squamous cell carcinoma of lung, Tobacco user Anemia of chronic disease, Cachexia, Hypercalcemia, Iron deficiency anemia, Pleural effusion on right, Pure hypercholesterolemia, SIADH (syndrome of inappropriate ADH production), ADHD, predominantly inattentive type, Depression with anxiety    On his last visit, the Zoloft was increased to 100mg at HS.     Today, patient states that he is doing well.   He states that the increase in the Zoloft was helpful. Sates that he still has a little anxiety that is work related but that he feels more normal now than he has in a while.   He is also still taking the Clonidine 0.1mg at HS for anxiety and inattention.     He is sleeping well at night.   He is not having any issues with his blood pressure.     He states that the anxiety that he experiences is work related; that is expected and it passes once he gets started on the job.    No medication changes needed.  FU in 4 months    PHQ Score:   07/13/2023: 1 minimal  05/15/2023: 1  03/24/2023: 5    DELIO-7 Score:   07/13/2023: 0 normal  05/15/2023: 3   03/24/2023: 10    Mental Status Evaluation:  Appearance:  age appropriate, casually dressed, neatly groomed   Behavior:  normal, cooperative   Speech:  no latency; no press   Mood:  euthymic   Affect:  mood-congruent   Thought Process:  normal and logical   Thought Content:  normal, no suicidality, no homicidality, delusions, or paranoia   Sensorium:  grossly intact   Cognition:  grossly intact   Insight:  intact   Judgment:  behavior is adequate to circumstances     Impression:  Anxiety/Separation Anxiety  2. MDD  3. ADHD - inattentive    Plan:  Continue Clonidine 0.1mg at bedtime for both anxiety and inattention  2. Continue Zoloft to 100mg at HS for depression and anxiety  3. Continue 1:1 therapy   4. Follow-up in 4 months    Follow-up #1  05/15/2023  HPI: Deepak Pratt is a 48 y.o. male here today for a psychiatric evaluation referred by PCP to the [facility] for depression  and anxiety  Past Medical History: Squamous cell carcinoma of lung, Tobacco user Anemia of chronic disease, Cachexia, Hypercalcemia, Iron deficiency anemia, Pleural effusion on right, Pure hypercholesterolemia, SIADH (syndrome of inappropriate ADH production), ADHD, predominantly inattentive type, Depression with anxiety    On his initial visit, patient was started on Clonidine 0.1mg at bedtime for both anxiety and inattention and Zoloft 25mg at bedtime x 2 weeks and then increase to 50mg at bedtime.    Today, patient states that he is doing much better. He still has some anxiety but not as much.     States that he is having an easier time going back to work. And while at work, he is not concentrating on how quickly he can finish and get back home.   He is getting out more. He and his family went to [place] for his [employer] [place] graduation. States that prior to starting the Zoloft and Clonidine, he wanted to go to [place] and come right back home. But, he found that he was able to enjoy the trip and they made a vacation out of the trip.   He is able to talk about his health without becoming tearful. He is feeling more positive.     Will increase the Zoloft to 100mg at HS and continue the Clonidine 0.1mg q HS.     FU in 6 weeks    PHQ Score:   05/15/2023: 1  03/24/2023: 5    DELIO-7 Score:   05/15/2023: 3   03/24/2023: 10    Mental Status Evaluation:  Appearance:  age appropriate, casually dressed, neatly groomed   Behavior:  normal, cooperative   Speech:  no latency; no press   Mood:  euthymic   Affect:  mood-congruent   Thought Process:  normal and logical   Thought Content:  normal, no suicidality, no homicidality, delusions, or paranoia   Sensorium:  grossly intact   Cognition:  grossly intact   Insight:  intact   Judgment:  behavior is adequate to circumstances     Impression:  Anxiety/Separation Anxiety  2. MDD  3. ADHD - inattentive    Plan:  Clonidine 0.1mg at bedtime for both anxiety and inattention  2. Increase Zoloft to  100mg at HS for depression and anxiety  3. Continue 1:1 therapy   4. Follow-up in 6 weeks    Initial Interview  2023  HPI: Deepak Pratt is a 48 y.o. 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 He Once he gets to work, he is fine but finds it difficult to leave to go to work. He feels that he is lacking confidence.  He cannot identify triggers except that anxiety begins the day before (around 1200) he is to go to a job. He starts worrying about going to work the next day and becomes sweaty.   He states that he is the  and is worried about the job getting done correctly and on time.   Onset 2022 when he broke his ankle and could not go to work that day.      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   He was on Adderall at the time because he was inattentive and unorganized.    He does not want to get back on Adderall. He felt that it was working great but his wife states that his attitude was bad. He feels that he may have been addicted. The doses were getting higher and higher.     He explains that when he was originally diagnosed with cancer, he was given 2 months to two years to live. He lost 40lbs and was planning his . States that he did very well with chemo and radiation and now should live into old age. States that he should be happy but that he still gets very emotional.   When he was told about his diagnoses, his was never  given any therapy OR he thinks that he may have refused it. His wife told him that he needed therapy but he refused.    He states that he had started to accept the fact that he might die.   Mainly he was worried about his family having to live through his death.   He believes in a higher power.   He believes that there might be something after death but not sure what.     He wanted to be cremated.   He did not know whether or not death was going to be painful. He did not think about it too much.   He is not concerned about getting hurt at work.     May have some separation anxiety??    He does not have the energy that he used to prior to his cancer diagnosis. He says that he has a lot of people around to help him but he feels guilty for not having the energy or the will/desire.  He feels a little less than the man that he was.     He had a good appetite and sleeps well.     Will try Clonidine 0.1mg at HS to address both anxiety and inattention.   Will also start Zoloft 25mg every night to address anxiety and depression.     Medications:   Current Outpatient Medications   Medication Instructions    diclofenac sodium (VOLTAREN) 2 g, Topical (Top), 4 times daily PRN, Do not exceed 32 grams/day: do not to exceed 8 grams/day/single joint of upper extremities; do not to exceed 16 grams/day/single joint of lower extremities.  Please request refill of this medication from your PCP.    EPINEPHrine (EPIPEN 2-SUSANA) 0.3 mg, Intramuscular, Once    predniSONE (DELTASONE) 10 mg, Oral, Daily PRN        Psychiatric History:   Reports a prior psychiatric history: depression and anxiety  History of mental health out-patient treatment:   History of in-patient psychiatric hospitalization: denies  History of suicidal ideations: denies  History of suicidal threats:   History of suicide attempts: denies  History of self mutilation:     History of psychotropic medications:   Buspar 7.5mg - gave him a sharp/strong buzz for about 30minutes and  then nothing  Adderall - for ADD    Family Psychiatric History:  Mental Illness: mother Bipolar Disorder  Alcohol abuse/addiction:   Drug addiction:     Substance Use History:  Alcohol: Began drinking at 15 or 15 y/o. Hx 6-8 beers daily for 10 years.  Stopped drinking about 3 months prior to cancer diagnosis  Marijuana: denies  Benzodiazepines: denies  Opiates: denies  Stimulants: Adderall x15  years in the past - 90mg a day in the end. Stopped takikng the Adderall a month prior to being diagnosed with cancer  Cocaine: denies  Methamphetamine: denies  Nicotine: Stopped smoking cigarettes August 2021--had been using 1.5 packs/daily.  Began smoking at 16 y/o  Caffeine:    Social History:   Grew up in: [place]  Raised by: parents  Number of siblings: one sister  Education: HS grad  Employment: PT construction  Marital Status:  x 29 years  Children: 32yo son and a 28yo son  Living situation: lives in a house with his wife  Manda[employer] affiliation:     Trauma History:  History of Emotional/Mental abuse: denies  History of Physical abuse: denies  History of Sexual abuse: denies  History of other trauma: being given a CA diagnosis and  told that he had 2months to 2 years to live    Legal History:  Legal history: denies  Denies being on probation or parole  Denies any upcoming court dates  Denies any pending charges.    PHQ Score:   03/24/2023: 5    DELIO-7 Score:   03/24/2023: 10    Mental Status Evaluation:  Appearance:  age appropriate, casually dressed, neatly groomed   Behavior:  normal, cooperative   Speech:  no latency; no press   Mood:  anxious, dysthymic   Affect:  mood-congruent   Thought Process:  normal and logical   Thought Content:  normal, no suicidality, no homicidality, delusions, or paranoia   Sensorium:  grossly intact   Cognition:  grossly intact   Insight:  intact   Judgment:  behavior is adequate to circumstances     Impression:  Anxiety/Separation Anxiety  2. MDD  3. ADHD -  inattentive    Plan:  Clonidine 0.1mg at bedtime for both anxiety and inattention  2. Zoloft 25mg at bedtime x 2 weeks and then increase to 50mg at bedtime for depression and anxiety  3. Continue 1:1 therapy   4. Follow-up in 6 weeks